# Patient Record
Sex: FEMALE | Race: OTHER
[De-identification: names, ages, dates, MRNs, and addresses within clinical notes are randomized per-mention and may not be internally consistent; named-entity substitution may affect disease eponyms.]

---

## 2017-03-31 PROBLEM — Z00.00 ENCOUNTER FOR PREVENTIVE HEALTH EXAMINATION: Status: ACTIVE | Noted: 2017-03-31

## 2017-04-12 ENCOUNTER — APPOINTMENT (OUTPATIENT)
Dept: SPINE | Facility: CLINIC | Age: 47
End: 2017-04-12

## 2017-04-12 VITALS
HEIGHT: 63 IN | BODY MASS INDEX: 32.78 KG/M2 | OXYGEN SATURATION: 98 % | SYSTOLIC BLOOD PRESSURE: 148 MMHG | DIASTOLIC BLOOD PRESSURE: 96 MMHG | WEIGHT: 185 LBS | HEART RATE: 64 BPM

## 2017-04-12 DIAGNOSIS — Z78.9 OTHER SPECIFIED HEALTH STATUS: ICD-10-CM

## 2017-04-12 DIAGNOSIS — Z86.79 PERSONAL HISTORY OF OTHER DISEASES OF THE CIRCULATORY SYSTEM: ICD-10-CM

## 2017-04-12 DIAGNOSIS — Z86.69 PERSONAL HISTORY OF OTHER DISEASES OF THE NERVOUS SYSTEM AND SENSE ORGANS: ICD-10-CM

## 2017-04-12 RX ORDER — AMLODIPINE BESYLATE 5 MG/1
TABLET ORAL
Refills: 0 | Status: ACTIVE | COMMUNITY

## 2017-05-31 ENCOUNTER — APPOINTMENT (OUTPATIENT)
Dept: SPINE | Facility: CLINIC | Age: 47
End: 2017-05-31

## 2017-05-31 VITALS
OXYGEN SATURATION: 99 % | WEIGHT: 185 LBS | DIASTOLIC BLOOD PRESSURE: 93 MMHG | BODY MASS INDEX: 32.78 KG/M2 | HEIGHT: 63 IN | HEART RATE: 51 BPM | SYSTOLIC BLOOD PRESSURE: 154 MMHG

## 2017-07-12 ENCOUNTER — APPOINTMENT (OUTPATIENT)
Dept: ORTHOPEDIC SURGERY | Facility: CLINIC | Age: 47
End: 2017-07-12

## 2017-07-12 VITALS — SYSTOLIC BLOOD PRESSURE: 131 MMHG | DIASTOLIC BLOOD PRESSURE: 84 MMHG | HEART RATE: 67 BPM

## 2017-07-12 VITALS — WEIGHT: 180 LBS | BODY MASS INDEX: 29.99 KG/M2 | HEIGHT: 65 IN

## 2017-09-11 ENCOUNTER — APPOINTMENT (OUTPATIENT)
Dept: ORTHOPEDIC SURGERY | Facility: CLINIC | Age: 47
End: 2017-09-11
Payer: MEDICAID

## 2017-09-11 VITALS
HEART RATE: 69 BPM | WEIGHT: 190 LBS | SYSTOLIC BLOOD PRESSURE: 134 MMHG | DIASTOLIC BLOOD PRESSURE: 89 MMHG | HEIGHT: 63 IN | BODY MASS INDEX: 33.66 KG/M2

## 2017-09-11 DIAGNOSIS — Z78.9 OTHER SPECIFIED HEALTH STATUS: ICD-10-CM

## 2017-09-11 PROCEDURE — 99215 OFFICE O/P EST HI 40 MIN: CPT

## 2017-09-11 RX ORDER — FAMOTIDINE 20 MG/1
20 TABLET, FILM COATED ORAL
Qty: 60 | Refills: 0 | Status: ACTIVE | COMMUNITY
Start: 2017-08-22

## 2017-09-11 RX ORDER — CALCIUM CARBONATE/VITAMIN D3 600 MG-10
600-400 TABLET ORAL
Qty: 30 | Refills: 0 | Status: ACTIVE | COMMUNITY
Start: 2017-08-22

## 2017-09-11 RX ORDER — CYCLOBENZAPRINE HYDROCHLORIDE 5 MG/1
5 TABLET, FILM COATED ORAL
Qty: 21 | Refills: 0 | Status: ACTIVE | COMMUNITY
Start: 2017-06-22

## 2017-09-11 RX ORDER — DOCUSATE SODIUM 100 MG/1
100 CAPSULE ORAL
Qty: 90 | Refills: 0 | Status: ACTIVE | COMMUNITY
Start: 2017-08-22

## 2017-09-11 RX ORDER — IBUPROFEN 800 MG/1
800 TABLET, FILM COATED ORAL
Qty: 21 | Refills: 0 | Status: ACTIVE | COMMUNITY
Start: 2017-06-22

## 2017-09-14 ENCOUNTER — APPOINTMENT (OUTPATIENT)
Dept: MRI IMAGING | Facility: CLINIC | Age: 47
End: 2017-09-14

## 2017-09-27 ENCOUNTER — OUTPATIENT (OUTPATIENT)
Dept: OUTPATIENT SERVICES | Facility: HOSPITAL | Age: 47
LOS: 1 days | End: 2017-09-27
Payer: MEDICAID

## 2017-09-27 VITALS
SYSTOLIC BLOOD PRESSURE: 127 MMHG | DIASTOLIC BLOOD PRESSURE: 89 MMHG | RESPIRATION RATE: 16 BRPM | HEIGHT: 63 IN | TEMPERATURE: 98 F | HEART RATE: 60 BPM | OXYGEN SATURATION: 97 % | WEIGHT: 195.11 LBS

## 2017-09-27 DIAGNOSIS — Z30.430 ENCOUNTER FOR INSERTION OF INTRAUTERINE CONTRACEPTIVE DEVICE: Chronic | ICD-10-CM

## 2017-09-27 DIAGNOSIS — Z90.49 ACQUIRED ABSENCE OF OTHER SPECIFIED PARTS OF DIGESTIVE TRACT: Chronic | ICD-10-CM

## 2017-09-27 DIAGNOSIS — Z98.51 TUBAL LIGATION STATUS: Chronic | ICD-10-CM

## 2017-09-27 DIAGNOSIS — Z01.818 ENCOUNTER FOR OTHER PREPROCEDURAL EXAMINATION: ICD-10-CM

## 2017-09-27 DIAGNOSIS — M50.20 OTHER CERVICAL DISC DISPLACEMENT, UNSPECIFIED CERVICAL REGION: ICD-10-CM

## 2017-09-27 DIAGNOSIS — I10 ESSENTIAL (PRIMARY) HYPERTENSION: ICD-10-CM

## 2017-09-27 LAB
ANION GAP SERPL CALC-SCNC: 15 MMOL/L — SIGNIFICANT CHANGE UP (ref 5–17)
BLD GP AB SCN SERPL QL: NEGATIVE — SIGNIFICANT CHANGE UP
BUN SERPL-MCNC: 9 MG/DL — SIGNIFICANT CHANGE UP (ref 7–23)
CALCIUM SERPL-MCNC: 9.8 MG/DL — SIGNIFICANT CHANGE UP (ref 8.4–10.5)
CHLORIDE SERPL-SCNC: 100 MMOL/L — SIGNIFICANT CHANGE UP (ref 96–108)
CO2 SERPL-SCNC: 26 MMOL/L — SIGNIFICANT CHANGE UP (ref 22–31)
CREAT SERPL-MCNC: 0.73 MG/DL — SIGNIFICANT CHANGE UP (ref 0.5–1.3)
GLUCOSE SERPL-MCNC: 95 MG/DL — SIGNIFICANT CHANGE UP (ref 70–99)
HCT VFR BLD CALC: 39.1 % — SIGNIFICANT CHANGE UP (ref 34.5–45)
HGB BLD-MCNC: 13.1 G/DL — SIGNIFICANT CHANGE UP (ref 11.5–15.5)
MCHC RBC-ENTMCNC: 29.8 PG — SIGNIFICANT CHANGE UP (ref 27–34)
MCHC RBC-ENTMCNC: 33.5 GM/DL — SIGNIFICANT CHANGE UP (ref 32–36)
MCV RBC AUTO: 89.1 FL — SIGNIFICANT CHANGE UP (ref 80–100)
PLATELET # BLD AUTO: 329 K/UL — SIGNIFICANT CHANGE UP (ref 150–400)
POTASSIUM SERPL-MCNC: 4 MMOL/L — SIGNIFICANT CHANGE UP (ref 3.5–5.3)
POTASSIUM SERPL-SCNC: 4 MMOL/L — SIGNIFICANT CHANGE UP (ref 3.5–5.3)
RBC # BLD: 4.39 M/UL — SIGNIFICANT CHANGE UP (ref 3.8–5.2)
RBC # FLD: 13.4 % — SIGNIFICANT CHANGE UP (ref 10.3–14.5)
RH IG SCN BLD-IMP: POSITIVE — SIGNIFICANT CHANGE UP
SODIUM SERPL-SCNC: 141 MMOL/L — SIGNIFICANT CHANGE UP (ref 135–145)
WBC # BLD: 6.84 K/UL — SIGNIFICANT CHANGE UP (ref 3.8–10.5)
WBC # FLD AUTO: 6.84 K/UL — SIGNIFICANT CHANGE UP (ref 3.8–10.5)

## 2017-09-27 PROCEDURE — G0463: CPT

## 2017-09-27 PROCEDURE — 80048 BASIC METABOLIC PNL TOTAL CA: CPT

## 2017-09-27 PROCEDURE — 85027 COMPLETE CBC AUTOMATED: CPT

## 2017-09-27 PROCEDURE — 86850 RBC ANTIBODY SCREEN: CPT

## 2017-09-27 PROCEDURE — 86900 BLOOD TYPING SEROLOGIC ABO: CPT

## 2017-09-27 PROCEDURE — 86901 BLOOD TYPING SEROLOGIC RH(D): CPT

## 2017-09-27 RX ORDER — CEFAZOLIN SODIUM 1 G
2000 VIAL (EA) INJECTION ONCE
Qty: 0 | Refills: 0 | Status: DISCONTINUED | OUTPATIENT
Start: 2017-10-11 | End: 2017-10-16

## 2017-09-27 NOTE — H&P PST ADULT - NS PRO LAST MENSTRUAL DATE
3/2017, Pt report she was having excessive menstural bleeding and  inserted a Mirena IUD. Since the IUD was implanted she has not gotten her period.

## 2017-09-27 NOTE — H&P PST ADULT - PMH
Cervical disc displacement    Cervical radiculopathy    Gall stones    Hypertension    Menorrhagia with irregular cycle    Migraines    Shoulder impingement, right

## 2017-09-27 NOTE — H&P PST ADULT - PSH
Encounter for insertion of mirena IUD  2017  H/O tubal ligation  2008 [facility]  S/P laparoscopic cholecystectomy

## 2017-09-27 NOTE — H&P PST ADULT - NSANTHOSAYNRD_GEN_A_CORE
No. TASIA screening performed.  STOP BANG Legend: 0-2 = LOW Risk; 3-4 = INTERMEDIATE Risk; 5-8 = HIGH Risk

## 2017-09-27 NOTE — H&P PST ADULT - HISTORY OF PRESENT ILLNESS
46 Y/O Female Reports she fell down stairs about 17 months ago. Seen by MD.  S/P MRI , & CT neck.Pt tried PT, spinal injections with little relief. Presents for surgery 48 Y/O R[place]an speaking female Reports she fell down stairs about 17 months ago. C/O Neck pain radiating down right arm , 5 out of 10 .  Seen by MD.  S/P MRI , & CT neck. Pt tried PT, spinal injections with little relief. Presents for surgeryC6-7 Anterior cervical Discectomy and Fusion.

## 2017-10-11 ENCOUNTER — APPOINTMENT (OUTPATIENT)
Dept: ORTHOPEDIC SURGERY | Facility: HOSPITAL | Age: 47
End: 2017-10-11

## 2017-10-11 ENCOUNTER — TRANSCRIPTION ENCOUNTER (OUTPATIENT)
Age: 47
End: 2017-10-11

## 2017-10-11 ENCOUNTER — INPATIENT (INPATIENT)
Facility: HOSPITAL | Age: 47
LOS: 4 days | Discharge: NURSING, REHAB OR LONG-TERM CARE | DRG: 473 | End: 2017-10-16
Attending: ORTHOPAEDIC SURGERY | Admitting: ORTHOPAEDIC SURGERY
Payer: MEDICAID

## 2017-10-11 ENCOUNTER — RESULT REVIEW (OUTPATIENT)
Age: 47
End: 2017-10-11

## 2017-10-11 VITALS
RESPIRATION RATE: 20 BRPM | OXYGEN SATURATION: 100 % | TEMPERATURE: 98 F | HEART RATE: 60 BPM | DIASTOLIC BLOOD PRESSURE: 88 MMHG | HEIGHT: 63 IN | SYSTOLIC BLOOD PRESSURE: 137 MMHG | WEIGHT: 195.11 LBS

## 2017-10-11 DIAGNOSIS — Z30.430 ENCOUNTER FOR INSERTION OF INTRAUTERINE CONTRACEPTIVE DEVICE: Chronic | ICD-10-CM

## 2017-10-11 DIAGNOSIS — Z90.49 ACQUIRED ABSENCE OF OTHER SPECIFIED PARTS OF DIGESTIVE TRACT: Chronic | ICD-10-CM

## 2017-10-11 DIAGNOSIS — Z98.51 TUBAL LIGATION STATUS: Chronic | ICD-10-CM

## 2017-10-11 DIAGNOSIS — M50.20 OTHER CERVICAL DISC DISPLACEMENT, UNSPECIFIED CERVICAL REGION: ICD-10-CM

## 2017-10-11 DIAGNOSIS — Z01.818 ENCOUNTER FOR OTHER PREPROCEDURAL EXAMINATION: ICD-10-CM

## 2017-10-11 LAB
ANION GAP SERPL CALC-SCNC: 15 MMOL/L — SIGNIFICANT CHANGE UP (ref 5–17)
BASOPHILS # BLD AUTO: 0 K/UL — SIGNIFICANT CHANGE UP (ref 0–0.2)
BASOPHILS NFR BLD AUTO: 0.5 % — SIGNIFICANT CHANGE UP (ref 0–2)
BUN SERPL-MCNC: 8 MG/DL — SIGNIFICANT CHANGE UP (ref 7–23)
CALCIUM SERPL-MCNC: 7.9 MG/DL — LOW (ref 8.4–10.5)
CHLORIDE SERPL-SCNC: 105 MMOL/L — SIGNIFICANT CHANGE UP (ref 96–108)
CO2 SERPL-SCNC: 23 MMOL/L — SIGNIFICANT CHANGE UP (ref 22–31)
CREAT SERPL-MCNC: 0.59 MG/DL — SIGNIFICANT CHANGE UP (ref 0.5–1.3)
EOSINOPHIL # BLD AUTO: 0.1 K/UL — SIGNIFICANT CHANGE UP (ref 0–0.5)
EOSINOPHIL NFR BLD AUTO: 1 % — SIGNIFICANT CHANGE UP (ref 0–6)
GLUCOSE SERPL-MCNC: 154 MG/DL — HIGH (ref 70–99)
HCT VFR BLD CALC: 35.7 % — SIGNIFICANT CHANGE UP (ref 34.5–45)
HGB BLD-MCNC: 12.5 G/DL — SIGNIFICANT CHANGE UP (ref 11.5–15.5)
LYMPHOCYTES # BLD AUTO: 2.9 K/UL — SIGNIFICANT CHANGE UP (ref 1–3.3)
LYMPHOCYTES # BLD AUTO: 32.8 % — SIGNIFICANT CHANGE UP (ref 13–44)
MCHC RBC-ENTMCNC: 32 PG — SIGNIFICANT CHANGE UP (ref 27–34)
MCHC RBC-ENTMCNC: 34.9 GM/DL — SIGNIFICANT CHANGE UP (ref 32–36)
MCV RBC AUTO: 91.7 FL — SIGNIFICANT CHANGE UP (ref 80–100)
MONOCYTES # BLD AUTO: 0.5 K/UL — SIGNIFICANT CHANGE UP (ref 0–0.9)
MONOCYTES NFR BLD AUTO: 6.1 % — SIGNIFICANT CHANGE UP (ref 2–14)
NEUTROPHILS # BLD AUTO: 5.3 K/UL — SIGNIFICANT CHANGE UP (ref 1.8–7.4)
NEUTROPHILS NFR BLD AUTO: 59.6 % — SIGNIFICANT CHANGE UP (ref 43–77)
PLATELET # BLD AUTO: 249 K/UL — SIGNIFICANT CHANGE UP (ref 150–400)
POTASSIUM SERPL-MCNC: 3.1 MMOL/L — LOW (ref 3.5–5.3)
POTASSIUM SERPL-SCNC: 3.1 MMOL/L — LOW (ref 3.5–5.3)
RBC # BLD: 3.9 M/UL — SIGNIFICANT CHANGE UP (ref 3.8–5.2)
RBC # FLD: 12.1 % — SIGNIFICANT CHANGE UP (ref 10.3–14.5)
RH IG SCN BLD-IMP: POSITIVE — SIGNIFICANT CHANGE UP
SODIUM SERPL-SCNC: 143 MMOL/L — SIGNIFICANT CHANGE UP (ref 135–145)
WBC # BLD: 8.8 K/UL — SIGNIFICANT CHANGE UP (ref 3.8–10.5)
WBC # FLD AUTO: 8.8 K/UL — SIGNIFICANT CHANGE UP (ref 3.8–10.5)

## 2017-10-11 PROCEDURE — 22845 INSERT SPINE FIXATION DEVICE: CPT | Mod: 59

## 2017-10-11 PROCEDURE — 22853 INSJ BIOMECHANICAL DEVICE: CPT

## 2017-10-11 PROCEDURE — 88311 DECALCIFY TISSUE: CPT | Mod: 26

## 2017-10-11 PROCEDURE — 88304 TISSUE EXAM BY PATHOLOGIST: CPT | Mod: 26

## 2017-10-11 PROCEDURE — 22853 INSJ BIOMECHANICAL DEVICE: CPT | Mod: AS

## 2017-10-11 PROCEDURE — 22845 INSERT SPINE FIXATION DEVICE: CPT | Mod: AS,59

## 2017-10-11 PROCEDURE — 22551 ARTHRD ANT NTRBDY CERVICAL: CPT | Mod: AS

## 2017-10-11 PROCEDURE — 22551 ARTHRD ANT NTRBDY CERVICAL: CPT

## 2017-10-11 RX ORDER — GABAPENTIN 400 MG/1
300 CAPSULE ORAL THREE TIMES A DAY
Qty: 0 | Refills: 0 | Status: DISCONTINUED | OUTPATIENT
Start: 2017-10-11 | End: 2017-10-16

## 2017-10-11 RX ORDER — DEXAMETHASONE 0.5 MG/5ML
5 ELIXIR ORAL EVERY 6 HOURS
Qty: 0 | Refills: 0 | Status: COMPLETED | OUTPATIENT
Start: 2017-10-11 | End: 2017-10-12

## 2017-10-11 RX ORDER — METOCLOPRAMIDE HCL 10 MG
5 TABLET ORAL ONCE
Qty: 0 | Refills: 0 | Status: COMPLETED | OUTPATIENT
Start: 2017-10-11 | End: 2017-10-11

## 2017-10-11 RX ORDER — SENNA PLUS 8.6 MG/1
2 TABLET ORAL AT BEDTIME
Qty: 0 | Refills: 0 | Status: DISCONTINUED | OUTPATIENT
Start: 2017-10-11 | End: 2017-10-16

## 2017-10-11 RX ORDER — DEXAMETHASONE 0.5 MG/5ML
3 ELIXIR ORAL EVERY 6 HOURS
Qty: 0 | Refills: 0 | Status: DISCONTINUED | OUTPATIENT
Start: 2017-10-12 | End: 2017-10-13

## 2017-10-11 RX ORDER — IPRATROPIUM BROMIDE 0.2 MG/ML
500 SOLUTION, NON-ORAL INHALATION ONCE
Qty: 0 | Refills: 0 | Status: COMPLETED | OUTPATIENT
Start: 2017-10-11 | End: 2017-10-11

## 2017-10-11 RX ORDER — OXYCODONE HYDROCHLORIDE 5 MG/1
10 TABLET ORAL
Qty: 0 | Refills: 0 | Status: DISCONTINUED | OUTPATIENT
Start: 2017-10-11 | End: 2017-10-16

## 2017-10-11 RX ORDER — CEFAZOLIN SODIUM 1 G
2000 VIAL (EA) INJECTION EVERY 8 HOURS
Qty: 0 | Refills: 0 | Status: COMPLETED | OUTPATIENT
Start: 2017-10-11 | End: 2017-10-12

## 2017-10-11 RX ORDER — FAMOTIDINE 10 MG/ML
20 INJECTION INTRAVENOUS EVERY 12 HOURS
Qty: 0 | Refills: 0 | Status: DISCONTINUED | OUTPATIENT
Start: 2017-10-11 | End: 2017-10-16

## 2017-10-11 RX ORDER — HYDROMORPHONE HYDROCHLORIDE 2 MG/ML
0.5 INJECTION INTRAMUSCULAR; INTRAVENOUS; SUBCUTANEOUS
Qty: 0 | Refills: 0 | Status: DISCONTINUED | OUTPATIENT
Start: 2017-10-11 | End: 2017-10-11

## 2017-10-11 RX ORDER — HYDROMORPHONE HYDROCHLORIDE 2 MG/ML
1 INJECTION INTRAMUSCULAR; INTRAVENOUS; SUBCUTANEOUS
Qty: 0 | Refills: 0 | Status: DISCONTINUED | OUTPATIENT
Start: 2017-10-11 | End: 2017-10-11

## 2017-10-11 RX ORDER — DOCUSATE SODIUM 100 MG
100 CAPSULE ORAL THREE TIMES A DAY
Qty: 0 | Refills: 0 | Status: DISCONTINUED | OUTPATIENT
Start: 2017-10-11 | End: 2017-10-16

## 2017-10-11 RX ORDER — AMLODIPINE BESYLATE 2.5 MG/1
10 TABLET ORAL DAILY
Qty: 0 | Refills: 0 | Status: DISCONTINUED | OUTPATIENT
Start: 2017-10-11 | End: 2017-10-16

## 2017-10-11 RX ORDER — ONDANSETRON 8 MG/1
4 TABLET, FILM COATED ORAL ONCE
Qty: 0 | Refills: 0 | Status: DISCONTINUED | OUTPATIENT
Start: 2017-10-11 | End: 2017-10-11

## 2017-10-11 RX ORDER — DIAZEPAM 5 MG
5 TABLET ORAL EVERY 12 HOURS
Qty: 0 | Refills: 0 | Status: DISCONTINUED | OUTPATIENT
Start: 2017-10-11 | End: 2017-10-16

## 2017-10-11 RX ORDER — ONDANSETRON 8 MG/1
4 TABLET, FILM COATED ORAL EVERY 6 HOURS
Qty: 0 | Refills: 0 | Status: DISCONTINUED | OUTPATIENT
Start: 2017-10-11 | End: 2017-10-16

## 2017-10-11 RX ORDER — POTASSIUM CHLORIDE 20 MEQ
20 PACKET (EA) ORAL
Qty: 0 | Refills: 0 | Status: COMPLETED | OUTPATIENT
Start: 2017-10-11 | End: 2017-10-11

## 2017-10-11 RX ORDER — ACETAMINOPHEN 500 MG
1000 TABLET ORAL ONCE
Qty: 0 | Refills: 0 | Status: COMPLETED | OUTPATIENT
Start: 2017-10-11 | End: 2017-10-11

## 2017-10-11 RX ORDER — OXYCODONE HYDROCHLORIDE 5 MG/1
5 TABLET ORAL
Qty: 0 | Refills: 0 | Status: DISCONTINUED | OUTPATIENT
Start: 2017-10-11 | End: 2017-10-16

## 2017-10-11 RX ORDER — SODIUM CHLORIDE 9 MG/ML
1000 INJECTION, SOLUTION INTRAVENOUS
Qty: 0 | Refills: 0 | Status: DISCONTINUED | OUTPATIENT
Start: 2017-10-11 | End: 2017-10-16

## 2017-10-11 RX ORDER — SODIUM CHLORIDE 9 MG/ML
3 INJECTION INTRAMUSCULAR; INTRAVENOUS; SUBCUTANEOUS EVERY 8 HOURS
Qty: 0 | Refills: 0 | Status: DISCONTINUED | OUTPATIENT
Start: 2017-10-11 | End: 2017-10-11

## 2017-10-11 RX ORDER — MORPHINE SULFATE 50 MG/1
4 CAPSULE, EXTENDED RELEASE ORAL
Qty: 0 | Refills: 0 | Status: DISCONTINUED | OUTPATIENT
Start: 2017-10-11 | End: 2017-10-16

## 2017-10-11 RX ORDER — MAGNESIUM HYDROXIDE 400 MG/1
30 TABLET, CHEWABLE ORAL EVERY 12 HOURS
Qty: 0 | Refills: 0 | Status: DISCONTINUED | OUTPATIENT
Start: 2017-10-11 | End: 2017-10-16

## 2017-10-11 RX ORDER — PANTOPRAZOLE SODIUM 20 MG/1
40 TABLET, DELAYED RELEASE ORAL
Qty: 0 | Refills: 0 | Status: DISCONTINUED | OUTPATIENT
Start: 2017-10-11 | End: 2017-10-16

## 2017-10-11 RX ORDER — DEXAMETHASONE 0.5 MG/5ML
1 ELIXIR ORAL EVERY 6 HOURS
Qty: 0 | Refills: 0 | Status: DISCONTINUED | OUTPATIENT
Start: 2017-10-13 | End: 2017-10-13

## 2017-10-11 RX ADMIN — GABAPENTIN 300 MILLIGRAM(S): 400 CAPSULE ORAL at 21:03

## 2017-10-11 RX ADMIN — HYDROMORPHONE HYDROCHLORIDE 0.5 MILLIGRAM(S): 2 INJECTION INTRAMUSCULAR; INTRAVENOUS; SUBCUTANEOUS at 18:45

## 2017-10-11 RX ADMIN — HYDROMORPHONE HYDROCHLORIDE 1 MILLIGRAM(S): 2 INJECTION INTRAMUSCULAR; INTRAVENOUS; SUBCUTANEOUS at 19:15

## 2017-10-11 RX ADMIN — HYDROMORPHONE HYDROCHLORIDE 0.5 MILLIGRAM(S): 2 INJECTION INTRAMUSCULAR; INTRAVENOUS; SUBCUTANEOUS at 21:15

## 2017-10-11 RX ADMIN — Medication 5 MILLIGRAM(S): at 21:04

## 2017-10-11 RX ADMIN — Medication 5 MILLIGRAM(S): at 19:35

## 2017-10-11 RX ADMIN — HYDROMORPHONE HYDROCHLORIDE 0.5 MILLIGRAM(S): 2 INJECTION INTRAMUSCULAR; INTRAVENOUS; SUBCUTANEOUS at 19:00

## 2017-10-11 RX ADMIN — HYDROMORPHONE HYDROCHLORIDE 0.5 MILLIGRAM(S): 2 INJECTION INTRAMUSCULAR; INTRAVENOUS; SUBCUTANEOUS at 21:30

## 2017-10-11 RX ADMIN — Medication 500 MICROGRAM(S): at 23:11

## 2017-10-11 RX ADMIN — Medication 5 MILLIGRAM(S): at 23:11

## 2017-10-11 RX ADMIN — ONDANSETRON 4 MILLIGRAM(S): 8 TABLET, FILM COATED ORAL at 20:30

## 2017-10-11 RX ADMIN — Medication 400 MILLIGRAM(S): at 23:11

## 2017-10-11 RX ADMIN — HYDROMORPHONE HYDROCHLORIDE 1 MILLIGRAM(S): 2 INJECTION INTRAMUSCULAR; INTRAVENOUS; SUBCUTANEOUS at 19:30

## 2017-10-11 RX ADMIN — Medication 100 MILLIGRAM(S): at 22:59

## 2017-10-11 RX ADMIN — SODIUM CHLORIDE 100 MILLILITER(S): 9 INJECTION, SOLUTION INTRAVENOUS at 18:25

## 2017-10-11 RX ADMIN — Medication 1000 MILLIGRAM(S): at 23:26

## 2017-10-11 RX ADMIN — Medication 20 MILLIEQUIVALENT(S): at 20:03

## 2017-10-11 RX ADMIN — Medication 20 MILLIEQUIVALENT(S): at 21:04

## 2017-10-11 NOTE — BRIEF OPERATIVE NOTE - PROCEDURE
<<-----Click on this checkbox to enter Procedure Anterior cervical discectomy with fusion  10/11/2017  C6/C7  Active  [id number]

## 2017-10-11 NOTE — PATIENT PROFILE ADULT. - PSH
Encounter for insertion of mirena IUD  2017  H/O tubal ligation  2008 [facility]  S/P laparoscopic cholecystectomy

## 2017-10-11 NOTE — CHART NOTE - NSCHARTNOTEFT_GEN_A_CORE
Patient primarily Upper sorbian-speaking, refused  services,  bilingual.  Patient Resting, no Chest Pain, SOB, N/V.  Pre op, patient reported RUE pain, parasthesias, radiculopathy, numbness, and weakness ; Post op, patient reports no new deficits, persisting RUE sx.    T(C): 36.2 (10-11-17 @ 17:50), Max: 36.9 (10-11-17 @ 09:53)  HR: 61 (10-11-17 @ 20:00) (55 - 65)  BP: 137/88 (10-11-17 @ 09:53) (137/88 - 137/88)  RR: 14 (10-11-17 @ 20:00) (14 - 20)  SpO2: 99% (10-11-17 @ 20:00) (95% - 100%)  Wt(kg): --  Exam:   Alert/Oriented, No Acute Distress           Dressing: [x ] clean/dry/intact  [ ] Other:  anterior neck, in [place] J         Drains: : n/a         Sensation: [ x] intact to light touch  [x ] decreased: RUE in ulnar distribution         Motor exam: [  x]          [x ] Upper extremity                Bi          Tri        Delt                                                    R         4/5        4/5        4/5                                               L          4+/5        4+/5        4+/5          R<L           [ x] Lower extremity            Intact bilat hip flexor/abd/add/knee flex/ext bilat                                                          PF          DF         EHL       FHL                                                                                            R        5/5        5/5        5/5       5/5                                                        L         5/5        5/5        5/5       5/5                                                                  Calves Soft/Non-tender bilaterally           LABS:                        12.5   8.8   )-----------( 249      ( 11 Oct 2017 19:17 )             35.7     10-11    143  |  105  |  8   ----------------------------<  154<H>  3.1<L>   |  23  |  0.59    Ca    7.9<L>      11 Oct 2017 19:17    A/P :  47y Female s/p ACDF C6-C7.  Post op, no new deficits, reporting pain, decreased sensation in ulnar distribution RUE.  VSS. NAD  -    Pain control  -    Taper  -    DVT ppx: SCDs       -    Physical Therapy  -    Weight bearing status: WBAT in [place] when arrives, aileen VILLAGOMEZ for now      Christophe Catalan PA-C  Team Pager #[phone]

## 2017-10-12 LAB
ANION GAP SERPL CALC-SCNC: 16 MMOL/L — SIGNIFICANT CHANGE UP (ref 5–17)
BASOPHILS # BLD AUTO: 0.01 K/UL — SIGNIFICANT CHANGE UP (ref 0–0.2)
BASOPHILS NFR BLD AUTO: 0.1 % — SIGNIFICANT CHANGE UP (ref 0–2)
BUN SERPL-MCNC: 10 MG/DL — SIGNIFICANT CHANGE UP (ref 7–23)
CALCIUM SERPL-MCNC: 9.2 MG/DL — SIGNIFICANT CHANGE UP (ref 8.4–10.5)
CHLORIDE SERPL-SCNC: 103 MMOL/L — SIGNIFICANT CHANGE UP (ref 96–108)
CO2 SERPL-SCNC: 20 MMOL/L — LOW (ref 22–31)
CREAT SERPL-MCNC: 0.76 MG/DL — SIGNIFICANT CHANGE UP (ref 0.5–1.3)
EOSINOPHIL # BLD AUTO: 0 K/UL — SIGNIFICANT CHANGE UP (ref 0–0.5)
EOSINOPHIL NFR BLD AUTO: 0 % — SIGNIFICANT CHANGE UP (ref 0–6)
GLUCOSE SERPL-MCNC: 182 MG/DL — HIGH (ref 70–99)
HCT VFR BLD CALC: 37.7 % — SIGNIFICANT CHANGE UP (ref 34.5–45)
HGB BLD-MCNC: 13 G/DL — SIGNIFICANT CHANGE UP (ref 11.5–15.5)
IMM GRANULOCYTES NFR BLD AUTO: 0.2 % — SIGNIFICANT CHANGE UP (ref 0–1.5)
LYMPHOCYTES # BLD AUTO: 0.86 K/UL — LOW (ref 1–3.3)
LYMPHOCYTES # BLD AUTO: 5.7 % — LOW (ref 13–44)
MCHC RBC-ENTMCNC: 30.7 PG — SIGNIFICANT CHANGE UP (ref 27–34)
MCHC RBC-ENTMCNC: 34.5 GM/DL — SIGNIFICANT CHANGE UP (ref 32–36)
MCV RBC AUTO: 88.9 FL — SIGNIFICANT CHANGE UP (ref 80–100)
MONOCYTES # BLD AUTO: 0.09 K/UL — SIGNIFICANT CHANGE UP (ref 0–0.9)
MONOCYTES NFR BLD AUTO: 0.6 % — LOW (ref 2–14)
NEUTROPHILS # BLD AUTO: 14.13 K/UL — HIGH (ref 1.8–7.4)
NEUTROPHILS NFR BLD AUTO: 93.4 % — HIGH (ref 43–77)
PLATELET # BLD AUTO: 309 K/UL — SIGNIFICANT CHANGE UP (ref 150–400)
POTASSIUM SERPL-MCNC: 3.8 MMOL/L — SIGNIFICANT CHANGE UP (ref 3.5–5.3)
POTASSIUM SERPL-SCNC: 3.8 MMOL/L — SIGNIFICANT CHANGE UP (ref 3.5–5.3)
RBC # BLD: 4.24 M/UL — SIGNIFICANT CHANGE UP (ref 3.8–5.2)
RBC # FLD: 13.6 % — SIGNIFICANT CHANGE UP (ref 10.3–14.5)
SODIUM SERPL-SCNC: 139 MMOL/L — SIGNIFICANT CHANGE UP (ref 135–145)
WBC # BLD: 15.12 K/UL — HIGH (ref 3.8–10.5)
WBC # FLD AUTO: 15.12 K/UL — HIGH (ref 3.8–10.5)

## 2017-10-12 PROCEDURE — 72125 CT NECK SPINE W/O DYE: CPT | Mod: 26

## 2017-10-12 PROCEDURE — 72141 MRI NECK SPINE W/O DYE: CPT | Mod: 26

## 2017-10-12 RX ORDER — TRAMADOL HYDROCHLORIDE 50 MG/1
50 TABLET ORAL ONCE
Qty: 0 | Refills: 0 | Status: DISCONTINUED | OUTPATIENT
Start: 2017-10-12 | End: 2017-10-12

## 2017-10-12 RX ORDER — INFLUENZA VIRUS VACCINE 15; 15; 15; 15 UG/.5ML; UG/.5ML; UG/.5ML; UG/.5ML
0.5 SUSPENSION INTRAMUSCULAR ONCE
Qty: 0 | Refills: 0 | Status: COMPLETED | OUTPATIENT
Start: 2017-10-12 | End: 2017-10-12

## 2017-10-12 RX ADMIN — Medication 5 MILLIGRAM(S): at 03:35

## 2017-10-12 RX ADMIN — Medication 100 MILLIGRAM(S): at 06:05

## 2017-10-12 RX ADMIN — AMLODIPINE BESYLATE 10 MILLIGRAM(S): 2.5 TABLET ORAL at 06:19

## 2017-10-12 RX ADMIN — GABAPENTIN 300 MILLIGRAM(S): 400 CAPSULE ORAL at 13:51

## 2017-10-12 RX ADMIN — Medication 3 MILLIGRAM(S): at 21:50

## 2017-10-12 RX ADMIN — MORPHINE SULFATE 4 MILLIGRAM(S): 50 CAPSULE, EXTENDED RELEASE ORAL at 22:06

## 2017-10-12 RX ADMIN — Medication 100 MILLIGRAM(S): at 21:55

## 2017-10-12 RX ADMIN — Medication 100 MILLIGRAM(S): at 13:51

## 2017-10-12 RX ADMIN — TRAMADOL HYDROCHLORIDE 50 MILLIGRAM(S): 50 TABLET ORAL at 01:57

## 2017-10-12 RX ADMIN — OXYCODONE HYDROCHLORIDE 10 MILLIGRAM(S): 5 TABLET ORAL at 06:35

## 2017-10-12 RX ADMIN — PANTOPRAZOLE SODIUM 40 MILLIGRAM(S): 20 TABLET, DELAYED RELEASE ORAL at 06:05

## 2017-10-12 RX ADMIN — SENNA PLUS 2 TABLET(S): 8.6 TABLET ORAL at 21:55

## 2017-10-12 RX ADMIN — OXYCODONE HYDROCHLORIDE 10 MILLIGRAM(S): 5 TABLET ORAL at 14:22

## 2017-10-12 RX ADMIN — MORPHINE SULFATE 4 MILLIGRAM(S): 50 CAPSULE, EXTENDED RELEASE ORAL at 22:36

## 2017-10-12 RX ADMIN — OXYCODONE HYDROCHLORIDE 10 MILLIGRAM(S): 5 TABLET ORAL at 06:05

## 2017-10-12 RX ADMIN — MORPHINE SULFATE 4 MILLIGRAM(S): 50 CAPSULE, EXTENDED RELEASE ORAL at 16:19

## 2017-10-12 RX ADMIN — GABAPENTIN 300 MILLIGRAM(S): 400 CAPSULE ORAL at 21:55

## 2017-10-12 RX ADMIN — Medication 100 MILLIGRAM(S): at 06:53

## 2017-10-12 RX ADMIN — Medication 5 MILLIGRAM(S): at 08:21

## 2017-10-12 RX ADMIN — OXYCODONE HYDROCHLORIDE 10 MILLIGRAM(S): 5 TABLET ORAL at 13:52

## 2017-10-12 RX ADMIN — GABAPENTIN 300 MILLIGRAM(S): 400 CAPSULE ORAL at 06:05

## 2017-10-12 RX ADMIN — SODIUM CHLORIDE 100 MILLILITER(S): 9 INJECTION, SOLUTION INTRAVENOUS at 22:58

## 2017-10-12 RX ADMIN — Medication 5 MILLIGRAM(S): at 09:03

## 2017-10-12 RX ADMIN — MORPHINE SULFATE 4 MILLIGRAM(S): 50 CAPSULE, EXTENDED RELEASE ORAL at 16:34

## 2017-10-12 RX ADMIN — Medication 5 MILLIGRAM(S): at 15:00

## 2017-10-12 RX ADMIN — TRAMADOL HYDROCHLORIDE 50 MILLIGRAM(S): 50 TABLET ORAL at 02:27

## 2017-10-12 NOTE — PROGRESS NOTE ADULT - ASSESSMENT
47F s/p C6-7 ACDF POD #1    pain control  WBAT  [place] J  Dressing changed  IV tylenol x1 dose stat.  O2 via NC  will reasses

## 2017-10-12 NOTE — PHYSICAL THERAPY INITIAL EVALUATION ADULT - ACTIVE RANGE OF MOTION EXAMINATION, REHAB EVAL
bilateral  lower extremity Active ROM was WFL (within functional limits)/LUE AROM WFLs, RUE AAROM WFLs pain with movement

## 2017-10-12 NOTE — PHYSICAL THERAPY INITIAL EVALUATION ADULT - PERTINENT HX OF CURRENT PROBLEM, REHAB EVAL
Pt is a 47 y.o female hx cervical pain radiating down BUE with numbness/weakness now s/p sx as noted above.

## 2017-10-12 NOTE — PHYSICAL THERAPY INITIAL EVALUATION ADULT - GAIT DEVIATIONS NOTED, PT EVAL
decreased weight-shifting ability/decreased horacio/decreased velocity of limb motion/decreased stride length

## 2017-10-12 NOTE — PHYSICAL THERAPY INITIAL EVALUATION ADULT - ADDITIONAL COMMENTS
PTA pt lived in pvt home with , dtr, 5 steps to enter +HR, no steps inside, pt had difficulty with ADLs due to BUE deficits, family works during the day so she is alone.

## 2017-10-13 LAB
ANION GAP SERPL CALC-SCNC: 10 MMOL/L — SIGNIFICANT CHANGE UP (ref 5–17)
BASOPHILS # BLD AUTO: 0.01 K/UL — SIGNIFICANT CHANGE UP (ref 0–0.2)
BASOPHILS NFR BLD AUTO: 0.1 % — SIGNIFICANT CHANGE UP (ref 0–2)
BUN SERPL-MCNC: 14 MG/DL — SIGNIFICANT CHANGE UP (ref 7–23)
CALCIUM SERPL-MCNC: 8.6 MG/DL — SIGNIFICANT CHANGE UP (ref 8.4–10.5)
CHLORIDE SERPL-SCNC: 104 MMOL/L — SIGNIFICANT CHANGE UP (ref 96–108)
CO2 SERPL-SCNC: 26 MMOL/L — SIGNIFICANT CHANGE UP (ref 22–31)
CREAT SERPL-MCNC: 0.62 MG/DL — SIGNIFICANT CHANGE UP (ref 0.5–1.3)
EOSINOPHIL # BLD AUTO: 0 K/UL — SIGNIFICANT CHANGE UP (ref 0–0.5)
EOSINOPHIL NFR BLD AUTO: 0 % — SIGNIFICANT CHANGE UP (ref 0–6)
GLUCOSE SERPL-MCNC: 113 MG/DL — HIGH (ref 70–99)
HCT VFR BLD CALC: 34.9 % — SIGNIFICANT CHANGE UP (ref 34.5–45)
HGB BLD-MCNC: 11.8 G/DL — SIGNIFICANT CHANGE UP (ref 11.5–15.5)
IMM GRANULOCYTES NFR BLD AUTO: 0.3 % — SIGNIFICANT CHANGE UP (ref 0–1.5)
LYMPHOCYTES # BLD AUTO: 1.56 K/UL — SIGNIFICANT CHANGE UP (ref 1–3.3)
LYMPHOCYTES # BLD AUTO: 8 % — LOW (ref 13–44)
MCHC RBC-ENTMCNC: 30.3 PG — SIGNIFICANT CHANGE UP (ref 27–34)
MCHC RBC-ENTMCNC: 33.8 GM/DL — SIGNIFICANT CHANGE UP (ref 32–36)
MCV RBC AUTO: 89.7 FL — SIGNIFICANT CHANGE UP (ref 80–100)
MONOCYTES # BLD AUTO: 0.77 K/UL — SIGNIFICANT CHANGE UP (ref 0–0.9)
MONOCYTES NFR BLD AUTO: 3.9 % — SIGNIFICANT CHANGE UP (ref 2–14)
NEUTROPHILS # BLD AUTO: 17.18 K/UL — HIGH (ref 1.8–7.4)
NEUTROPHILS NFR BLD AUTO: 87.7 % — HIGH (ref 43–77)
PLATELET # BLD AUTO: 275 K/UL — SIGNIFICANT CHANGE UP (ref 150–400)
POTASSIUM SERPL-MCNC: 4.1 MMOL/L — SIGNIFICANT CHANGE UP (ref 3.5–5.3)
POTASSIUM SERPL-SCNC: 4.1 MMOL/L — SIGNIFICANT CHANGE UP (ref 3.5–5.3)
RBC # BLD: 3.89 M/UL — SIGNIFICANT CHANGE UP (ref 3.8–5.2)
RBC # FLD: 13.6 % — SIGNIFICANT CHANGE UP (ref 10.3–14.5)
SODIUM SERPL-SCNC: 140 MMOL/L — SIGNIFICANT CHANGE UP (ref 135–145)
WBC # BLD: 19.58 K/UL — HIGH (ref 3.8–10.5)
WBC # FLD AUTO: 19.58 K/UL — HIGH (ref 3.8–10.5)

## 2017-10-13 RX ORDER — DEXAMETHASONE 0.5 MG/5ML
1 ELIXIR ORAL EVERY 6 HOURS
Qty: 0 | Refills: 0 | Status: COMPLETED | OUTPATIENT
Start: 2017-10-15 | End: 2017-10-15

## 2017-10-13 RX ORDER — DEXAMETHASONE 0.5 MG/5ML
5 ELIXIR ORAL EVERY 6 HOURS
Qty: 0 | Refills: 0 | Status: COMPLETED | OUTPATIENT
Start: 2017-10-13 | End: 2017-10-14

## 2017-10-13 RX ORDER — ACETAMINOPHEN 500 MG
1000 TABLET ORAL ONCE
Qty: 0 | Refills: 0 | Status: COMPLETED | OUTPATIENT
Start: 2017-10-13 | End: 2017-10-13

## 2017-10-13 RX ORDER — DEXAMETHASONE 0.5 MG/5ML
ELIXIR ORAL
Qty: 0 | Refills: 0 | Status: COMPLETED | OUTPATIENT
Start: 2017-10-14 | End: 2017-10-15

## 2017-10-13 RX ORDER — DEXAMETHASONE 0.5 MG/5ML
3 ELIXIR ORAL EVERY 6 HOURS
Qty: 0 | Refills: 0 | Status: COMPLETED | OUTPATIENT
Start: 2017-10-14 | End: 2017-10-14

## 2017-10-13 RX ADMIN — MORPHINE SULFATE 4 MILLIGRAM(S): 50 CAPSULE, EXTENDED RELEASE ORAL at 05:00

## 2017-10-13 RX ADMIN — OXYCODONE HYDROCHLORIDE 10 MILLIGRAM(S): 5 TABLET ORAL at 10:03

## 2017-10-13 RX ADMIN — PANTOPRAZOLE SODIUM 40 MILLIGRAM(S): 20 TABLET, DELAYED RELEASE ORAL at 06:14

## 2017-10-13 RX ADMIN — Medication 100 MILLIGRAM(S): at 23:19

## 2017-10-13 RX ADMIN — SENNA PLUS 2 TABLET(S): 8.6 TABLET ORAL at 23:19

## 2017-10-13 RX ADMIN — MORPHINE SULFATE 4 MILLIGRAM(S): 50 CAPSULE, EXTENDED RELEASE ORAL at 04:26

## 2017-10-13 RX ADMIN — OXYCODONE HYDROCHLORIDE 10 MILLIGRAM(S): 5 TABLET ORAL at 23:59

## 2017-10-13 RX ADMIN — Medication 400 MILLIGRAM(S): at 11:38

## 2017-10-13 RX ADMIN — Medication 1000 MILLIGRAM(S): at 12:08

## 2017-10-13 RX ADMIN — OXYCODONE HYDROCHLORIDE 10 MILLIGRAM(S): 5 TABLET ORAL at 06:12

## 2017-10-13 RX ADMIN — Medication 3 MILLIGRAM(S): at 03:08

## 2017-10-13 RX ADMIN — AMLODIPINE BESYLATE 10 MILLIGRAM(S): 2.5 TABLET ORAL at 06:13

## 2017-10-13 RX ADMIN — Medication 5 MILLIGRAM(S): at 11:39

## 2017-10-13 RX ADMIN — GABAPENTIN 300 MILLIGRAM(S): 400 CAPSULE ORAL at 23:19

## 2017-10-13 RX ADMIN — OXYCODONE HYDROCHLORIDE 10 MILLIGRAM(S): 5 TABLET ORAL at 23:19

## 2017-10-13 RX ADMIN — OXYCODONE HYDROCHLORIDE 10 MILLIGRAM(S): 5 TABLET ORAL at 00:32

## 2017-10-13 RX ADMIN — GABAPENTIN 300 MILLIGRAM(S): 400 CAPSULE ORAL at 15:27

## 2017-10-13 RX ADMIN — OXYCODONE HYDROCHLORIDE 10 MILLIGRAM(S): 5 TABLET ORAL at 01:02

## 2017-10-13 RX ADMIN — Medication 100 MILLIGRAM(S): at 06:13

## 2017-10-13 RX ADMIN — SODIUM CHLORIDE 100 MILLILITER(S): 9 INJECTION, SOLUTION INTRAVENOUS at 03:08

## 2017-10-13 RX ADMIN — OXYCODONE HYDROCHLORIDE 10 MILLIGRAM(S): 5 TABLET ORAL at 15:58

## 2017-10-13 RX ADMIN — Medication 1000 MILLIGRAM(S): at 21:52

## 2017-10-13 RX ADMIN — GABAPENTIN 300 MILLIGRAM(S): 400 CAPSULE ORAL at 06:13

## 2017-10-13 RX ADMIN — Medication 5 MILLIGRAM(S): at 04:34

## 2017-10-13 RX ADMIN — Medication 400 MILLIGRAM(S): at 21:14

## 2017-10-13 RX ADMIN — Medication 5 MILLIGRAM(S): at 17:58

## 2017-10-13 RX ADMIN — OXYCODONE HYDROCHLORIDE 10 MILLIGRAM(S): 5 TABLET ORAL at 10:33

## 2017-10-13 RX ADMIN — OXYCODONE HYDROCHLORIDE 10 MILLIGRAM(S): 5 TABLET ORAL at 15:28

## 2017-10-13 NOTE — PROGRESS NOTE ADULT - ASSESSMENT
Assessment: s/p C6-7 ACDF    Plan:   f/u off read MRI / CT  OOb -> chair  taper  ck labs  monitor Assessment: s/p C6-7 ACDF    Plan:   f/u off read MRI / CT  OOb -> chair  taper  ck labs  monitor    ***See Above  Edis SOLORIO  Orthopedics    B: [phone]  S: [phone]      Update:    Pt MOY at bedside w/ Dr Andino.   phone used.  Pt c/o R arm pain, primarily R wrist, MCP joints, limiting pt ability to move wrist/fingers.  Pt able move elbow/shoulder w/ minimal pain.  MRI/Csp reviewed w/ Dr Andino, normal postsurgical changes, will follow up official report. d/w pt need for better pain control.  Will adjust pain medications for better control, encourge PT, anticipate rehab placement when pain is controlled.  D/w Dr Andino.  Anam Kenney,

## 2017-10-14 LAB
ANION GAP SERPL CALC-SCNC: 13 MMOL/L — SIGNIFICANT CHANGE UP (ref 5–17)
BASOPHILS # BLD AUTO: 0.1 K/UL — SIGNIFICANT CHANGE UP (ref 0–0.2)
BASOPHILS NFR BLD AUTO: 0.5 % — SIGNIFICANT CHANGE UP (ref 0–2)
BUN SERPL-MCNC: 17 MG/DL — SIGNIFICANT CHANGE UP (ref 7–23)
CALCIUM SERPL-MCNC: 9.5 MG/DL — SIGNIFICANT CHANGE UP (ref 8.4–10.5)
CHLORIDE SERPL-SCNC: 101 MMOL/L — SIGNIFICANT CHANGE UP (ref 96–108)
CO2 SERPL-SCNC: 24 MMOL/L — SIGNIFICANT CHANGE UP (ref 22–31)
CREAT SERPL-MCNC: 0.71 MG/DL — SIGNIFICANT CHANGE UP (ref 0.5–1.3)
EOSINOPHIL # BLD AUTO: 0 K/UL — SIGNIFICANT CHANGE UP (ref 0–0.5)
EOSINOPHIL NFR BLD AUTO: 0 % — SIGNIFICANT CHANGE UP (ref 0–6)
GLUCOSE SERPL-MCNC: 247 MG/DL — HIGH (ref 70–99)
HCT VFR BLD CALC: 37.9 % — SIGNIFICANT CHANGE UP (ref 34.5–45)
HGB BLD-MCNC: 13.4 G/DL — SIGNIFICANT CHANGE UP (ref 11.5–15.5)
LYMPHOCYTES # BLD AUTO: 1.9 K/UL — SIGNIFICANT CHANGE UP (ref 1–3.3)
LYMPHOCYTES # BLD AUTO: 12.3 % — LOW (ref 13–44)
MCHC RBC-ENTMCNC: 32.7 PG — SIGNIFICANT CHANGE UP (ref 27–34)
MCHC RBC-ENTMCNC: 35.3 GM/DL — SIGNIFICANT CHANGE UP (ref 32–36)
MCV RBC AUTO: 92.6 FL — SIGNIFICANT CHANGE UP (ref 80–100)
MONOCYTES # BLD AUTO: 0.3 K/UL — SIGNIFICANT CHANGE UP (ref 0–0.9)
MONOCYTES NFR BLD AUTO: 2.1 % — SIGNIFICANT CHANGE UP (ref 2–14)
NEUTROPHILS # BLD AUTO: 13.5 K/UL — HIGH (ref 1.8–7.4)
NEUTROPHILS NFR BLD AUTO: 85.2 % — HIGH (ref 43–77)
PLATELET # BLD AUTO: 278 K/UL — SIGNIFICANT CHANGE UP (ref 150–400)
POTASSIUM SERPL-MCNC: 4.2 MMOL/L — SIGNIFICANT CHANGE UP (ref 3.5–5.3)
POTASSIUM SERPL-SCNC: 4.2 MMOL/L — SIGNIFICANT CHANGE UP (ref 3.5–5.3)
RBC # BLD: 4.09 M/UL — SIGNIFICANT CHANGE UP (ref 3.8–5.2)
RBC # FLD: 12.2 % — SIGNIFICANT CHANGE UP (ref 10.3–14.5)
SODIUM SERPL-SCNC: 138 MMOL/L — SIGNIFICANT CHANGE UP (ref 135–145)
WBC # BLD: 15.8 K/UL — HIGH (ref 3.8–10.5)
WBC # FLD AUTO: 15.8 K/UL — HIGH (ref 3.8–10.5)

## 2017-10-14 RX ORDER — ACETAMINOPHEN 500 MG
650 TABLET ORAL EVERY 6 HOURS
Qty: 0 | Refills: 0 | Status: DISCONTINUED | OUTPATIENT
Start: 2017-10-14 | End: 2017-10-16

## 2017-10-14 RX ADMIN — ONDANSETRON 4 MILLIGRAM(S): 8 TABLET, FILM COATED ORAL at 23:48

## 2017-10-14 RX ADMIN — Medication 100 MILLIGRAM(S): at 05:38

## 2017-10-14 RX ADMIN — OXYCODONE HYDROCHLORIDE 10 MILLIGRAM(S): 5 TABLET ORAL at 23:47

## 2017-10-14 RX ADMIN — Medication 3 MILLIGRAM(S): at 12:16

## 2017-10-14 RX ADMIN — GABAPENTIN 300 MILLIGRAM(S): 400 CAPSULE ORAL at 13:50

## 2017-10-14 RX ADMIN — Medication 650 MILLIGRAM(S): at 12:15

## 2017-10-14 RX ADMIN — OXYCODONE HYDROCHLORIDE 10 MILLIGRAM(S): 5 TABLET ORAL at 20:15

## 2017-10-14 RX ADMIN — GABAPENTIN 300 MILLIGRAM(S): 400 CAPSULE ORAL at 21:43

## 2017-10-14 RX ADMIN — Medication 3 MILLIGRAM(S): at 00:56

## 2017-10-14 RX ADMIN — Medication 100 MILLIGRAM(S): at 21:43

## 2017-10-14 RX ADMIN — AMLODIPINE BESYLATE 10 MILLIGRAM(S): 2.5 TABLET ORAL at 05:38

## 2017-10-14 RX ADMIN — Medication 3 MILLIGRAM(S): at 17:14

## 2017-10-14 RX ADMIN — OXYCODONE HYDROCHLORIDE 10 MILLIGRAM(S): 5 TABLET ORAL at 17:12

## 2017-10-14 RX ADMIN — Medication 3 MILLIGRAM(S): at 05:38

## 2017-10-14 RX ADMIN — GABAPENTIN 300 MILLIGRAM(S): 400 CAPSULE ORAL at 05:38

## 2017-10-14 RX ADMIN — Medication 1 MILLIGRAM(S): at 23:47

## 2017-10-14 RX ADMIN — Medication 100 MILLIGRAM(S): at 13:50

## 2017-10-14 RX ADMIN — SENNA PLUS 2 TABLET(S): 8.6 TABLET ORAL at 21:43

## 2017-10-14 RX ADMIN — Medication 650 MILLIGRAM(S): at 12:45

## 2017-10-14 RX ADMIN — OXYCODONE HYDROCHLORIDE 10 MILLIGRAM(S): 5 TABLET ORAL at 20:45

## 2017-10-14 RX ADMIN — Medication 5 MILLIGRAM(S): at 05:38

## 2017-10-14 RX ADMIN — PANTOPRAZOLE SODIUM 40 MILLIGRAM(S): 20 TABLET, DELAYED RELEASE ORAL at 05:38

## 2017-10-14 RX ADMIN — OXYCODONE HYDROCHLORIDE 10 MILLIGRAM(S): 5 TABLET ORAL at 17:45

## 2017-10-14 RX ADMIN — OXYCODONE HYDROCHLORIDE 10 MILLIGRAM(S): 5 TABLET ORAL at 05:51

## 2017-10-14 RX ADMIN — Medication 5 MILLIGRAM(S): at 00:56

## 2017-10-14 NOTE — OCCUPATIONAL THERAPY INITIAL EVALUATION ADULT - RANGE OF MOTION EXAMINATION, UPPER EXTREMITY
LUE shoulder FF to 80 degrees, AROM all other joints WFL; RUE: shoulder FF 30 degrees, elbow/wrist/hand 1/2 AROM, AAROM WFL

## 2017-10-14 NOTE — OCCUPATIONAL THERAPY INITIAL EVALUATION ADULT - PLANNED THERAPY INTERVENTIONS, OT EVAL
ROM/strengthening/ADL retraining/bed mobility training/balance training/fine motor coordination training/parent/caregiver training.../neuromuscular re-education/transfer training

## 2017-10-14 NOTE — OCCUPATIONAL THERAPY INITIAL EVALUATION ADULT - DIAGNOSIS, OT EVAL
decreased UE strength, endurance and balance limiting ADLS and mobility decreased UE strength, sensation., endurance and balance limiting ADLS and mobility

## 2017-10-14 NOTE — PROGRESS NOTE ADULT - ASSESSMENT
Patient is a 47y old  Female who presents with a chief complaint of Neck,  right shoulder & arm pain (11 Oct 2017 10:19) s/p C6-C7 ACDF. Pt stable.

## 2017-10-15 RX ORDER — SIMETHICONE 80 MG/1
80 TABLET, CHEWABLE ORAL
Qty: 0 | Refills: 0 | Status: DISCONTINUED | OUTPATIENT
Start: 2017-10-15 | End: 2017-10-16

## 2017-10-15 RX ADMIN — Medication 650 MILLIGRAM(S): at 11:48

## 2017-10-15 RX ADMIN — Medication 100 MILLIGRAM(S): at 06:11

## 2017-10-15 RX ADMIN — Medication 1 MILLIGRAM(S): at 11:12

## 2017-10-15 RX ADMIN — OXYCODONE HYDROCHLORIDE 10 MILLIGRAM(S): 5 TABLET ORAL at 21:50

## 2017-10-15 RX ADMIN — Medication 650 MILLIGRAM(S): at 02:36

## 2017-10-15 RX ADMIN — Medication 1 MILLIGRAM(S): at 06:11

## 2017-10-15 RX ADMIN — OXYCODONE HYDROCHLORIDE 10 MILLIGRAM(S): 5 TABLET ORAL at 19:03

## 2017-10-15 RX ADMIN — Medication 100 MILLIGRAM(S): at 15:24

## 2017-10-15 RX ADMIN — OXYCODONE HYDROCHLORIDE 10 MILLIGRAM(S): 5 TABLET ORAL at 11:48

## 2017-10-15 RX ADMIN — OXYCODONE HYDROCHLORIDE 10 MILLIGRAM(S): 5 TABLET ORAL at 06:41

## 2017-10-15 RX ADMIN — OXYCODONE HYDROCHLORIDE 10 MILLIGRAM(S): 5 TABLET ORAL at 00:17

## 2017-10-15 RX ADMIN — OXYCODONE HYDROCHLORIDE 10 MILLIGRAM(S): 5 TABLET ORAL at 06:11

## 2017-10-15 RX ADMIN — Medication 1 MILLIGRAM(S): at 18:25

## 2017-10-15 RX ADMIN — Medication 650 MILLIGRAM(S): at 11:09

## 2017-10-15 RX ADMIN — OXYCODONE HYDROCHLORIDE 10 MILLIGRAM(S): 5 TABLET ORAL at 15:54

## 2017-10-15 RX ADMIN — Medication 100 MILLIGRAM(S): at 21:50

## 2017-10-15 RX ADMIN — PANTOPRAZOLE SODIUM 40 MILLIGRAM(S): 20 TABLET, DELAYED RELEASE ORAL at 06:11

## 2017-10-15 RX ADMIN — Medication 650 MILLIGRAM(S): at 02:06

## 2017-10-15 RX ADMIN — OXYCODONE HYDROCHLORIDE 10 MILLIGRAM(S): 5 TABLET ORAL at 11:11

## 2017-10-15 RX ADMIN — SENNA PLUS 2 TABLET(S): 8.6 TABLET ORAL at 21:49

## 2017-10-15 RX ADMIN — OXYCODONE HYDROCHLORIDE 10 MILLIGRAM(S): 5 TABLET ORAL at 22:20

## 2017-10-15 RX ADMIN — GABAPENTIN 300 MILLIGRAM(S): 400 CAPSULE ORAL at 15:24

## 2017-10-15 RX ADMIN — AMLODIPINE BESYLATE 10 MILLIGRAM(S): 2.5 TABLET ORAL at 06:11

## 2017-10-15 RX ADMIN — OXYCODONE HYDROCHLORIDE 10 MILLIGRAM(S): 5 TABLET ORAL at 18:25

## 2017-10-15 RX ADMIN — GABAPENTIN 300 MILLIGRAM(S): 400 CAPSULE ORAL at 06:11

## 2017-10-15 RX ADMIN — GABAPENTIN 300 MILLIGRAM(S): 400 CAPSULE ORAL at 21:50

## 2017-10-15 RX ADMIN — OXYCODONE HYDROCHLORIDE 10 MILLIGRAM(S): 5 TABLET ORAL at 15:26

## 2017-10-16 ENCOUNTER — TRANSCRIPTION ENCOUNTER (OUTPATIENT)
Age: 47
End: 2017-10-16

## 2017-10-16 VITALS
SYSTOLIC BLOOD PRESSURE: 130 MMHG | HEART RATE: 62 BPM | DIASTOLIC BLOOD PRESSURE: 69 MMHG | TEMPERATURE: 98 F | OXYGEN SATURATION: 95 % | RESPIRATION RATE: 18 BRPM

## 2017-10-16 LAB — SURGICAL PATHOLOGY STUDY: SIGNIFICANT CHANGE UP

## 2017-10-16 PROCEDURE — 88304 TISSUE EXAM BY PATHOLOGIST: CPT

## 2017-10-16 PROCEDURE — 97530 THERAPEUTIC ACTIVITIES: CPT

## 2017-10-16 PROCEDURE — 72125 CT NECK SPINE W/O DYE: CPT

## 2017-10-16 PROCEDURE — 86901 BLOOD TYPING SEROLOGIC RH(D): CPT

## 2017-10-16 PROCEDURE — 97110 THERAPEUTIC EXERCISES: CPT

## 2017-10-16 PROCEDURE — 97162 PT EVAL MOD COMPLEX 30 MIN: CPT

## 2017-10-16 PROCEDURE — C1713: CPT

## 2017-10-16 PROCEDURE — 86900 BLOOD TYPING SEROLOGIC ABO: CPT

## 2017-10-16 PROCEDURE — 97166 OT EVAL MOD COMPLEX 45 MIN: CPT

## 2017-10-16 PROCEDURE — 88311 DECALCIFY TISSUE: CPT

## 2017-10-16 PROCEDURE — 76000 FLUOROSCOPY <1 HR PHYS/QHP: CPT

## 2017-10-16 PROCEDURE — 72141 MRI NECK SPINE W/O DYE: CPT

## 2017-10-16 PROCEDURE — 80048 BASIC METABOLIC PNL TOTAL CA: CPT

## 2017-10-16 PROCEDURE — 94640 AIRWAY INHALATION TREATMENT: CPT

## 2017-10-16 PROCEDURE — 85027 COMPLETE CBC AUTOMATED: CPT

## 2017-10-16 PROCEDURE — 97116 GAIT TRAINING THERAPY: CPT

## 2017-10-16 PROCEDURE — C1889: CPT

## 2017-10-16 RX ORDER — GABAPENTIN 400 MG/1
1 CAPSULE ORAL
Qty: 0 | Refills: 0 | COMMUNITY

## 2017-10-16 RX ORDER — FAMOTIDINE 10 MG/ML
1 INJECTION INTRAVENOUS
Qty: 0 | Refills: 0 | COMMUNITY
Start: 2017-10-16

## 2017-10-16 RX ORDER — OXYCODONE HYDROCHLORIDE 5 MG/1
1 TABLET ORAL
Qty: 0 | Refills: 0 | COMMUNITY
Start: 2017-10-16

## 2017-10-16 RX ORDER — SENNA PLUS 8.6 MG/1
2 TABLET ORAL
Qty: 0 | Refills: 0 | COMMUNITY
Start: 2017-10-16

## 2017-10-16 RX ORDER — PANTOPRAZOLE SODIUM 20 MG/1
1 TABLET, DELAYED RELEASE ORAL
Qty: 0 | Refills: 0 | COMMUNITY
Start: 2017-10-16

## 2017-10-16 RX ORDER — SIMETHICONE 80 MG/1
1 TABLET, CHEWABLE ORAL
Qty: 0 | Refills: 0 | COMMUNITY
Start: 2017-10-16

## 2017-10-16 RX ORDER — GABAPENTIN 400 MG/1
1 CAPSULE ORAL
Qty: 0 | Refills: 0 | DISCHARGE
Start: 2017-10-16

## 2017-10-16 RX ORDER — DOCUSATE SODIUM 100 MG
1 CAPSULE ORAL
Qty: 0 | Refills: 0 | COMMUNITY
Start: 2017-10-16

## 2017-10-16 RX ORDER — ACETAMINOPHEN 500 MG
2 TABLET ORAL
Qty: 0 | Refills: 0 | COMMUNITY
Start: 2017-10-16

## 2017-10-16 RX ORDER — DIAZEPAM 5 MG
1 TABLET ORAL
Qty: 0 | Refills: 0 | COMMUNITY
Start: 2017-10-16

## 2017-10-16 RX ORDER — OMEPRAZOLE 10 MG/1
1 CAPSULE, DELAYED RELEASE ORAL
Qty: 0 | Refills: 0 | COMMUNITY

## 2017-10-16 RX ADMIN — OXYCODONE HYDROCHLORIDE 10 MILLIGRAM(S): 5 TABLET ORAL at 00:57

## 2017-10-16 RX ADMIN — Medication 100 MILLIGRAM(S): at 05:20

## 2017-10-16 RX ADMIN — AMLODIPINE BESYLATE 10 MILLIGRAM(S): 2.5 TABLET ORAL at 05:20

## 2017-10-16 RX ADMIN — GABAPENTIN 300 MILLIGRAM(S): 400 CAPSULE ORAL at 13:33

## 2017-10-16 RX ADMIN — OXYCODONE HYDROCHLORIDE 10 MILLIGRAM(S): 5 TABLET ORAL at 00:27

## 2017-10-16 RX ADMIN — OXYCODONE HYDROCHLORIDE 10 MILLIGRAM(S): 5 TABLET ORAL at 14:56

## 2017-10-16 RX ADMIN — PANTOPRAZOLE SODIUM 40 MILLIGRAM(S): 20 TABLET, DELAYED RELEASE ORAL at 05:20

## 2017-10-16 RX ADMIN — OXYCODONE HYDROCHLORIDE 10 MILLIGRAM(S): 5 TABLET ORAL at 05:01

## 2017-10-16 RX ADMIN — OXYCODONE HYDROCHLORIDE 10 MILLIGRAM(S): 5 TABLET ORAL at 11:00

## 2017-10-16 RX ADMIN — Medication 100 MILLIGRAM(S): at 13:33

## 2017-10-16 RX ADMIN — OXYCODONE HYDROCHLORIDE 10 MILLIGRAM(S): 5 TABLET ORAL at 04:31

## 2017-10-16 RX ADMIN — OXYCODONE HYDROCHLORIDE 10 MILLIGRAM(S): 5 TABLET ORAL at 10:22

## 2017-10-16 RX ADMIN — GABAPENTIN 300 MILLIGRAM(S): 400 CAPSULE ORAL at 05:20

## 2017-10-16 RX ADMIN — OXYCODONE HYDROCHLORIDE 10 MILLIGRAM(S): 5 TABLET ORAL at 15:30

## 2017-10-16 RX ADMIN — Medication 5 MILLIGRAM(S): at 17:02

## 2017-10-16 NOTE — PROVIDER CONTACT NOTE (OTHER) - ASSESSMENT
Pt abruptly woken up after feeling like something is stuck in her throat. Pt coughing heavily & unable to catch her breathe. 2L O2 placed on pt. Pt denies chest pain; states "I think this happened because I slept with my mouth open this morning." Pt VSS. HOB elevated. Daughter at bedside to aide in translating.

## 2017-10-16 NOTE — DISCHARGE NOTE ADULT - PATIENT PORTAL LINK FT
“You can access the [facility] Patient Portal, offered by [facility], by registering with the following website: [email or web address]”

## 2017-10-16 NOTE — PROGRESS NOTE ADULT - ATTENDING COMMENTS
I examined and evaluated patient I agree with above. D/C rehab.
I examined and evaluated patient I agree with above. D/C rehab.
I examined and evaluated patient, since yesterday she is moving her LUE which is improved.  Her preop neck pain and RUE radiculopathy is improved as per patient. She does have paresthesias in Right hand and decreased sensation which is new since surgery, I do think this is postop in nature and the region is inflamed, that will resolve with time. CT and MRI - Show excellent instrumentation placement and excellent decompression at C6C7 level. I reviewed this with patient , she is pleased with care and surgical intervention.

## 2017-10-16 NOTE — PROGRESS NOTE ADULT - SUBJECTIVE AND OBJECTIVE BOX
Patient is a 47y old  Female who presents with a chief complaint of Neck,  right shoulder & arm pain (11 Oct 2017 10:19)    POST OPERATIVE DAY #:  [3]   Patient comfortable  c/o pain to touch on ulnar distrubution of right hand    T(C): 36.9 (10-14-17 @ 05:36), Max: 36.9 (10-14-17 @ 05:36)  HR: 92 (10-14-17 @ 05:36) (70 - 92)  BP: 147/80 (10-14-17 @ 05:36) (116/72 - 150/84)  RR: 18 (10-14-17 @ 05:36) (18 - 18)  SpO2: 96% (10-14-17 @ 05:36) (93% - 96%)    PHYSICAL EXAM:  NAD, Alert  Neck: anterior Dressing C/D/I; in aspen collar, sensation grossly intact to light touch; (+) Distal Pulses; No Calf tenderness B/L, PAS         Upper extremity                             Bi          Tri        Delt                                                    R         4/5        4/5        4/5                                               L          5/5        5/5        5/5          LABS:                        11.8   19.58 )-----------( 275      ( 13 Oct 2017 08:37 )             34.9     10-13    140  |  104  |  14  ----------------------------<  113<H>  4.1   |  26  |  0.62    Ca    8.6      13 Oct 2017 08:35          RADIOLOGY & ADDITIONAL TESTS:
POD # 1 s/p ACDF C67    S: Preop radiculopathy has not resolved. Patient has Right sided radiculopathy in similar distribution from prior to surgery.  She had Left sided wrist pain. Numbness and tingling as well.  Difficulty with movement of both upper extremities secondary to pain.   O: AVSS afebrile    PE:  Deltoid/Biceps/Triceps/ Right side - weak and poor effort due to pain, with effort 3-4/5, Left side - weak with poor effort due to pain, but with effort 4/5, Incision - Dry, no hematoma, soft.    A/P:    46 yo female s/p ACDF with radiculopathy and weakness in similiar distribution as prior to surgery, I counseled patient that symptoms should have dissipated at this point, recommend MRI of C Spine with/without contrast and CT of C Spine. Will re-evaluate later this PM after imaging.
POST OPERATIVE DAY #:  [5]   Patient comfortable;   c/o pain to touch on ulnar distrubution of right hand - pain much improved from yesterday. Pt still feels weak in R wrist/hand but overall improved from before.     Vital Signs Last 24 Hrs  T(C): 36.4 (16 Oct 2017 04:02), Max: 36.8 (15 Oct 2017 21:21)  T(F): 97.5 (16 Oct 2017 04:02), Max: 98.2 (15 Oct 2017 21:21)  HR: 59 (16 Oct 2017 04:02) (59 - 80)  BP: 144/93 (16 Oct 2017 04:02) (123/81 - 144/93)  BP(mean): --  RR: 20 (16 Oct 2017 04:02) (18 - 20)  SpO2: 96% (16 Oct 2017 04:02) (95% - 97%)    PHYSICAL EXAM:  NAD, Alert  Neck: anterior incision c/d/i; in aspen collar, sensation: dec sensation ulnar distribution RUE; Neg Perez's bilaterally; (+) Distal Pulses; No Calf tenderness B/L, PAS         Upper extremity                             Bi          Tri        Delt     WF      WE                                                  R         4/5        4/5        4/5     3/5      3/5	                                               L          5/5        5/5        5/5     5/5      5/5
Patient is a 47y old  Female who presents with a chief complaint of Neck,  right shoulder & arm pain (11 Oct 2017 10:19)      POST OPERATIVE DAY #:  [2 ]   Patient anxious; appears to NOT want to want move er upper extremities 2/2 pain; still c/o numbness and weakness R Arm   @ bedside    T(C): 37 (10-13-17 @ 05:40), Max: 37 (10-13-17 @ 05:40)  HR: 78 (10-13-17 @ 05:40) (77 - 103)  BP: 128/76 (10-13-17 @ 05:40) (98/59 - 128/76)  RR: 18 (10-13-17 @ 05:40) (18 - 18)  SpO2: 95% (10-13-17 @ 05:40) (92% - 97%)  Wt(kg): --    PHYSICAL EXAM:  NAD, Alert  collar in place  dsg changed: no drainage noted       [ x] Upper extremity                           Bi          Tri        Delt                                                       R         3/5        3/5        2-3/5                                                   L          4/5        45        4/5           mildly decr sensation R Arm w/ swelling noted      LABS:                        13.0   15.12 )-----------( 309      ( 12 Oct 2017 08:10 )             37.7     10-12    139  |  103  |  10  ----------------------------<  182<H>  3.8   |  20<L>  |  0.76    Ca    9.2      12 Oct 2017 08:48
Patient is a 47y old  Female who presents with a chief complaint of Neck,  right shoulder & arm pain (11 Oct 2017 10:19)    POST OPERATIVE DAY #:  [4]   Patient comfortable;   c/o pain to touch on ulnar distrubution of right hand - pain much improved from yesterday    Vital Signs Last 24 Hrs  T(C): 36.6 (15 Oct 2017 08:33), Max: 36.8 (14 Oct 2017 20:52)  T(F): 97.9 (15 Oct 2017 08:33), Max: 98.2 (14 Oct 2017 20:52)  HR: 72 (15 Oct 2017 08:33) (69 - 76)  BP: 143/82 (15 Oct 2017 08:33) (118/75 - 154/90)  BP(mean): --  RR: 18 (15 Oct 2017 08:33) (18 - 18)  SpO2: 96% (15 Oct 2017 08:33) (94% - 96%)    PHYSICAL EXAM:  NAD, Alert  Neck: anterior Dressing C/D/I; in aspen collar, sensation grossly intact to light touch; (+) Distal Pulses; No Calf tenderness B/L, PAS         Upper extremity                             Bi          Tri        Delt                                                    R         4/5        4/5        4/5                                               L          5/5        5/5        5/5          LABS:                        11.8   19.58 )-----------( 275      ( 13 Oct 2017 08:37 )             34.9     10-13    140  |  104  |  14  ----------------------------<  113<H>  4.1   |  26  |  0.62    Ca    8.6      13 Oct 2017 08:35          RADIOLOGY & ADDITIONAL TESTS:
Patient seen and examined at bedside.  States her pain is 7/10.  She states she feels short of breath.  Currently O2 sat 92% on 2 L via NC.  She does not want to move her BLUE secondary to pain.    Vital Signs Last 24 Hrs  T(C): 36.8 (12 Oct 2017 05:45), Max: 37 (11 Oct 2017 22:18)  T(F): 98.2 (12 Oct 2017 05:45), Max: 98.6 (11 Oct 2017 22:18)  HR: 104 (12 Oct 2017 06:12) (55 - 104)  BP: 126/76 (12 Oct 2017 06:12) (107/60 - 137/88)  BP(mean): 83 (11 Oct 2017 21:00) (78 - 83)  RR: 18 (12 Oct 2017 05:45) (14 - 20)  SpO2: 93% (12 Oct 2017 05:45) (93% - 100%)    Incision clean and intact, dressing changed.  No fluctuance or hematoma  Patient in [facility]  Unable to assess Neuro muscular status secondary to pain and refusal to move upper extremities

## 2017-10-16 NOTE — DISCHARGE NOTE ADULT - HOSPITAL COURSE
History of Present Illness		  46 Y/O [place] speaking female Reports she fell down stairs about 17 months ago. C/O Neck pain radiating down right arm , 5 out of 10 .  Seen by MD.  S/P MRI , & CT neck. Pt tried PT, spinal injections with little relief. Presents for surgeryC6-7 Anterior cervical Discectomy and Fusion.       Admitted for elective surgery on 10/11/17.  S/P C6-7 anterior cervical discectomy fusion.  Patient tolerated procedure well.  Post-op patient c/o more pain and upper extremity weakness?? increased.  MRI and CT performed on 10/12/17.  Both studies showed good alignment of hardware with no impingement of nerves.  Patient placed on steroid taper with improvement.  Physical therapy for ambulation WBAT.  [place] collar at all times.  PT recommended rehab.  Will transfer when bed available.

## 2017-10-16 NOTE — DISCHARGE NOTE ADULT - CARE PROVIDER_API CALL
Gabriel Andino (MD), Orthopedics  [address] [address]  Phone: [phone]  Fax: [phone]

## 2017-10-16 NOTE — DISCHARGE NOTE ADULT - MEDICATION SUMMARY - MEDICATIONS TO TAKE
I will START or STAY ON the medications listed below when I get home from the hospital:    [place] Collar  -- Dx: s/p C6-C7 ACDF  -- Indication: For Support    oxyCODONE 5 mg oral tablet  -- 1 tab(s) by mouth every 3 hours, As needed, Mild Pain (1 - 3)  -- Indication: For pain mgt    oxyCODONE 10 mg oral tablet  -- 1 tab(s) by mouth every 3 hours, As needed, Moderate Pain (4 - 6)  -- Indication: For pain mgt    acetaminophen 325 mg oral tablet  -- 2 tab(s) by mouth every 6 hours, As needed, For Temp greater than 38 C (100.4 F)  -- Indication: For temp    acetaminophen 325 mg oral tablet  -- 2 tab(s) by mouth every 6 hours, As needed, Mild Pain (1 - 3) or HA  -- Indication: For pain mgt    gabapentin 300 mg oral capsule  -- 1 cap(s) by mouth 3 times a day  -- Indication: For pain mgt    diazePAM 5 mg oral tablet  -- 1 tab(s) by mouth every 12 hours, As needed, Anxiety  -- Indication: For anxiety    amLODIPine  -- orally once a day  -- Indication: For Hypertension    famotidine 20 mg oral tablet  -- 1 tab(s) by mouth every 12 hours, As needed, Dyspepsia  -- Indication: For GI    docusate sodium 100 mg oral capsule  -- 1 cap(s) by mouth 3 times a day  -- Indication: For laxative    senna oral tablet  -- 2 tab(s) by mouth once a day (at bedtime)  -- Indication: For laxative    simethicone 80 mg oral tablet, chewable  -- 1 tab(s) by mouth 2 times a day, As needed, Indigestion  -- Indication: For indigestion    pantoprazole 40 mg oral delayed release tablet  -- 1 tab(s) by mouth once a day (before a meal)  -- Indication: For GI

## 2017-10-16 NOTE — DISCHARGE NOTE ADULT - NS AS ACTIVITY OBS
Do not drive or operate machinery/Walking-Outdoors allowed/Walking-Indoors allowed/Do not make important decisions/Stairs allowed/sponge bathe until f/u with Dr Andino within 7 - 10 days/No Heavy lifting/straining

## 2017-10-16 NOTE — DISCHARGE NOTE ADULT - CARE PROVIDERS DIRECT ADDRESSES
[email or web address]

## 2017-10-16 NOTE — PROGRESS NOTE ADULT - PROBLEM SELECTOR PROBLEM 1
Cervical disc displacement

## 2017-10-16 NOTE — DISCHARGE NOTE ADULT - ADDITIONAL INSTRUCTIONS
F/U with Dr Andino within 7 - 10 days.     Please f/u with your primary doctor after discharge from the hospital to discuss your hospital stay and any changes to your medication

## 2017-10-16 NOTE — PROGRESS NOTE ADULT - PROBLEM SELECTOR PLAN 1
- Pain control  - WBAT/OOB  - Julianne, IS  - Continue current tx     Ar Lui PA-C  Orthopedic Surgery  Pager: [phone]  Spectra: [phone]
***See Above  Edis SOLORIO  Orthopedics  B: [phone]  S: [phone]
- Pain control  - WBAT/OOB  - Julianne, IS  - Continue current tx     Ar Lui PA-C  Orthopedic Surgery  Pager: [phone]  Spectra: [phone]
- Pain control  - WBAT/OOB  -PT  - Julianne, IS  - Continue current tx   -Dispo planning

## 2017-10-16 NOTE — PROVIDER CONTACT NOTE (OTHER) - ACTION/TREATMENT ORDERED:
Anne Marie DO made aware; states "If her vitals are ok, there's nothing else I can do, we will round on her this morning." Tea provided to pt to aide in soothing throat. HOB remain elevated

## 2017-10-16 NOTE — DISCHARGE NOTE ADULT - PLAN OF CARE
resume ADL without pain.  increase strength upper extremities F/U with Dr Andino within 7 - 10 days.  Please see attached post-op instructions.  Physical therapy for ambulation WBAT.  [place] collar at all times.

## 2017-10-16 NOTE — DISCHARGE NOTE ADULT - CARE PLAN
Principal Discharge DX:	Cervical radiculopathy  Goal:	resume ADL without pain.  increase strength upper extremities  Instructions for follow-up, activity and diet:	F/U with Dr Andino within 7 - 10 days.  Please see attached post-op instructions.  Physical therapy for ambulation WBAT.  [place] collar at all times.

## 2017-10-24 ENCOUNTER — EMERGENCY (EMERGENCY)
Facility: HOSPITAL | Age: 47
LOS: 1 days | Discharge: HOME | End: 2017-10-24
Attending: EMERGENCY MEDICINE | Admitting: EMERGENCY MEDICINE
Payer: MEDICAID

## 2017-10-24 ENCOUNTER — EMERGENCY (EMERGENCY)
Facility: HOSPITAL | Age: 47
LOS: 1 days | Discharge: HOME | End: 2017-10-24
Admitting: INTERNAL MEDICINE
Payer: MEDICAID

## 2017-10-24 VITALS
HEART RATE: 98 BPM | DIASTOLIC BLOOD PRESSURE: 105 MMHG | OXYGEN SATURATION: 96 % | SYSTOLIC BLOOD PRESSURE: 168 MMHG | RESPIRATION RATE: 16 BRPM

## 2017-10-24 DIAGNOSIS — Z98.51 TUBAL LIGATION STATUS: Chronic | ICD-10-CM

## 2017-10-24 DIAGNOSIS — Z90.49 ACQUIRED ABSENCE OF OTHER SPECIFIED PARTS OF DIGESTIVE TRACT: Chronic | ICD-10-CM

## 2017-10-24 DIAGNOSIS — Z30.430 ENCOUNTER FOR INSERTION OF INTRAUTERINE CONTRACEPTIVE DEVICE: Chronic | ICD-10-CM

## 2017-10-24 DIAGNOSIS — M50.20 OTHER CERVICAL DISC DISPLACEMENT, UNSPECIFIED CERVICAL REGION: ICD-10-CM

## 2017-10-24 LAB
ALBUMIN SERPL ELPH-MCNC: 3.6 G/DL — SIGNIFICANT CHANGE UP (ref 3.3–5)
ALP SERPL-CCNC: 60 U/L — SIGNIFICANT CHANGE UP (ref 40–120)
ALT FLD-CCNC: 32 U/L RC — SIGNIFICANT CHANGE UP (ref 10–45)
ANION GAP SERPL CALC-SCNC: 13 MMOL/L — SIGNIFICANT CHANGE UP (ref 5–17)
AST SERPL-CCNC: 29 U/L — SIGNIFICANT CHANGE UP (ref 10–40)
BASOPHILS # BLD AUTO: 0.1 K/UL — SIGNIFICANT CHANGE UP (ref 0–0.2)
BASOPHILS NFR BLD AUTO: 1 % — SIGNIFICANT CHANGE UP (ref 0–2)
BILIRUB SERPL-MCNC: 0.5 MG/DL — SIGNIFICANT CHANGE UP (ref 0.2–1.2)
BUN SERPL-MCNC: 4 MG/DL — LOW (ref 7–23)
CALCIUM SERPL-MCNC: 8.5 MG/DL — SIGNIFICANT CHANGE UP (ref 8.4–10.5)
CHLORIDE SERPL-SCNC: 103 MMOL/L — SIGNIFICANT CHANGE UP (ref 96–108)
CO2 SERPL-SCNC: 25 MMOL/L — SIGNIFICANT CHANGE UP (ref 22–31)
CREAT SERPL-MCNC: 0.79 MG/DL — SIGNIFICANT CHANGE UP (ref 0.5–1.3)
EOSINOPHIL # BLD AUTO: 0.2 K/UL — SIGNIFICANT CHANGE UP (ref 0–0.5)
EOSINOPHIL NFR BLD AUTO: 2.7 % — SIGNIFICANT CHANGE UP (ref 0–6)
GLUCOSE SERPL-MCNC: 141 MG/DL — HIGH (ref 70–99)
HCT VFR BLD CALC: 38.6 % — SIGNIFICANT CHANGE UP (ref 34.5–45)
HGB BLD-MCNC: 13 G/DL — SIGNIFICANT CHANGE UP (ref 11.5–15.5)
LYMPHOCYTES # BLD AUTO: 2.7 K/UL — SIGNIFICANT CHANGE UP (ref 1–3.3)
LYMPHOCYTES # BLD AUTO: 29.7 % — SIGNIFICANT CHANGE UP (ref 13–44)
MCHC RBC-ENTMCNC: 31 PG — SIGNIFICANT CHANGE UP (ref 27–34)
MCHC RBC-ENTMCNC: 33.7 GM/DL — SIGNIFICANT CHANGE UP (ref 32–36)
MCV RBC AUTO: 92 FL — SIGNIFICANT CHANGE UP (ref 80–100)
MONOCYTES # BLD AUTO: 0.9 K/UL — SIGNIFICANT CHANGE UP (ref 0–0.9)
MONOCYTES NFR BLD AUTO: 9.9 % — SIGNIFICANT CHANGE UP (ref 2–14)
NEUTROPHILS # BLD AUTO: 5.1 K/UL — SIGNIFICANT CHANGE UP (ref 1.8–7.4)
NEUTROPHILS NFR BLD AUTO: 56.7 % — SIGNIFICANT CHANGE UP (ref 43–77)
PLATELET # BLD AUTO: 281 K/UL — SIGNIFICANT CHANGE UP (ref 150–400)
POTASSIUM SERPL-MCNC: 4 MMOL/L — SIGNIFICANT CHANGE UP (ref 3.5–5.3)
POTASSIUM SERPL-SCNC: 4 MMOL/L — SIGNIFICANT CHANGE UP (ref 3.5–5.3)
PROT SERPL-MCNC: 6.7 G/DL — SIGNIFICANT CHANGE UP (ref 6–8.3)
RBC # BLD: 4.2 M/UL — SIGNIFICANT CHANGE UP (ref 3.8–5.2)
RBC # FLD: 12 % — SIGNIFICANT CHANGE UP (ref 10.3–14.5)
S PYO AG SPEC QL IA: NEGATIVE — SIGNIFICANT CHANGE UP
SODIUM SERPL-SCNC: 141 MMOL/L — SIGNIFICANT CHANGE UP (ref 135–145)
WBC # BLD: 9 K/UL — SIGNIFICANT CHANGE UP (ref 3.8–10.5)
WBC # FLD AUTO: 9 K/UL — SIGNIFICANT CHANGE UP (ref 3.8–10.5)

## 2017-10-24 PROCEDURE — 85730 THROMBOPLASTIN TIME PARTIAL: CPT

## 2017-10-24 PROCEDURE — 72125 CT NECK SPINE W/O DYE: CPT

## 2017-10-24 PROCEDURE — 99285 EMERGENCY DEPT VISIT HI MDM: CPT

## 2017-10-24 PROCEDURE — 72125 CT NECK SPINE W/O DYE: CPT | Mod: 26

## 2017-10-24 PROCEDURE — 80048 BASIC METABOLIC PNL TOTAL CA: CPT

## 2017-10-24 PROCEDURE — 85610 PROTHROMBIN TIME: CPT

## 2017-10-24 PROCEDURE — 36415 COLL VENOUS BLD VENIPUNCTURE: CPT

## 2017-10-24 PROCEDURE — 99285 EMERGENCY DEPT VISIT HI MDM: CPT | Mod: 25

## 2017-10-24 PROCEDURE — 99220: CPT

## 2017-10-24 PROCEDURE — 70450 CT HEAD/BRAIN W/O DYE: CPT | Mod: 26

## 2017-10-24 PROCEDURE — 85027 COMPLETE CBC AUTOMATED: CPT

## 2017-10-24 RX ORDER — ACETAMINOPHEN 500 MG
1000 TABLET ORAL ONCE
Qty: 0 | Refills: 0 | Status: COMPLETED | OUTPATIENT
Start: 2017-10-24 | End: 2017-10-24

## 2017-10-24 RX ORDER — SODIUM CHLORIDE 9 MG/ML
1000 INJECTION INTRAMUSCULAR; INTRAVENOUS; SUBCUTANEOUS ONCE
Qty: 0 | Refills: 0 | Status: COMPLETED | OUTPATIENT
Start: 2017-10-24 | End: 2017-10-24

## 2017-10-24 RX ORDER — AMLODIPINE BESYLATE 2.5 MG/1
5 TABLET ORAL DAILY
Qty: 0 | Refills: 0 | Status: DISCONTINUED | OUTPATIENT
Start: 2017-10-24 | End: 2017-10-28

## 2017-10-24 RX ORDER — OXYCODONE HYDROCHLORIDE 5 MG/1
10 TABLET ORAL EVERY 6 HOURS
Qty: 0 | Refills: 0 | Status: DISCONTINUED | OUTPATIENT
Start: 2017-10-24 | End: 2017-10-25

## 2017-10-24 RX ORDER — DOCUSATE SODIUM 100 MG
100 CAPSULE ORAL THREE TIMES A DAY
Qty: 0 | Refills: 0 | Status: DISCONTINUED | OUTPATIENT
Start: 2017-10-24 | End: 2017-10-28

## 2017-10-24 RX ORDER — SIMETHICONE 80 MG/1
80 TABLET, CHEWABLE ORAL
Qty: 0 | Refills: 0 | Status: DISCONTINUED | OUTPATIENT
Start: 2017-10-24 | End: 2017-10-28

## 2017-10-24 RX ORDER — FAMOTIDINE 10 MG/ML
20 INJECTION INTRAVENOUS
Qty: 0 | Refills: 0 | Status: DISCONTINUED | OUTPATIENT
Start: 2017-10-24 | End: 2017-10-28

## 2017-10-24 RX ORDER — ACETAMINOPHEN 500 MG
650 TABLET ORAL EVERY 6 HOURS
Qty: 0 | Refills: 0 | Status: DISCONTINUED | OUTPATIENT
Start: 2017-10-24 | End: 2017-10-28

## 2017-10-24 RX ORDER — GABAPENTIN 400 MG/1
300 CAPSULE ORAL THREE TIMES A DAY
Qty: 0 | Refills: 0 | Status: DISCONTINUED | OUTPATIENT
Start: 2017-10-24 | End: 2017-10-28

## 2017-10-24 RX ORDER — PANTOPRAZOLE SODIUM 20 MG/1
40 TABLET, DELAYED RELEASE ORAL
Qty: 0 | Refills: 0 | Status: DISCONTINUED | OUTPATIENT
Start: 2017-10-24 | End: 2017-10-28

## 2017-10-24 RX ORDER — SODIUM CHLORIDE 9 MG/ML
3 INJECTION INTRAMUSCULAR; INTRAVENOUS; SUBCUTANEOUS EVERY 12 HOURS
Qty: 0 | Refills: 0 | Status: DISCONTINUED | OUTPATIENT
Start: 2017-10-24 | End: 2017-10-28

## 2017-10-24 RX ORDER — SENNA PLUS 8.6 MG/1
2 TABLET ORAL AT BEDTIME
Qty: 0 | Refills: 0 | Status: DISCONTINUED | OUTPATIENT
Start: 2017-10-24 | End: 2017-10-28

## 2017-10-24 RX ADMIN — SODIUM CHLORIDE 2000 MILLILITER(S): 9 INJECTION INTRAMUSCULAR; INTRAVENOUS; SUBCUTANEOUS at 18:45

## 2017-10-24 RX ADMIN — Medication 400 MILLIGRAM(S): at 18:12

## 2017-10-24 RX ADMIN — Medication 1000 MILLIGRAM(S): at 18:45

## 2017-10-24 NOTE — ED PROVIDER NOTE - ATTENDING CONTRIBUTION TO CARE
47F hx recent c6/7 anterior fusion (Andino) c/b residual RUE paresthesias/weakness transferred from [place] ED with new neck pain s/p mounted TV falling on patient at rehab 2 days ago.  +headache.  Additionally, pt reports some difficulty with dysphagia diet. 47F hx recent c6/7 anterior fusion (Andino) c/b residual RUE paresthesias/weakness transferred from [place] ED with new neck pain s/p mounted TV falling on patient at rehab 2 days ago.  +headache.  Additionally, pt reports some difficulty with dysphagia diet.  VSS.  A+Ox3, neuro intact.  miami-j in place, mild midline cervical, R paraspinal an R trap ttp.  healing anterior cervical surgical scar with no dehiscence, discharge, erythema.  Orthospine consult.  likely CDU for MRI. 47F hx recent c6/7 anterior fusion (Andino) c/b residual RUE paresthesias/weakness transferred from [place] ED with new neck pain s/p mounted TV falling on patient at rehab 2 days ago.  +headache.  Additionally, pt reports some difficulty with dysphagia diet.  VSS.  A+Ox3, neuro intact.  miami-j in place, mild midline cervical, R paraspinal an R trap ttp.  healing anterior cervical surgical scar with no dehiscence, discharge, erythema.  lesions to upper labialis, small exudate of R tonsil. strep.  Orthospine consult.  likely CDU for MRI.

## 2017-10-24 NOTE — ED CDU PROVIDER INITIAL DAY NOTE - OBJECTIVE STATEMENT
48 yo F h/o C5/C6 discectomy with anterior fusion 2 weeks ago transferred to rehab because of R arm weakness BIBA after a wall-mounted television fell on the back of her head and neck 2 days ago at facility. Pt reports increased neck pain, paraspinal tenderness, posterior head pain, dysphagia. No visual changes, new focal neurologic deficit, N/V, visible new trauma, fever, chills, confusion, LOC, CP, SOB, abdominal pain, blood in stool or urine. 48 yo F h/o C5/C6 discectomy with anterior fusion 2 weeks ago transferred to rehab because of R arm weakness comes in with c/o neck pain, after a wall-mounted television fell on the back of her head and neck yesterday at facility. Pt reports that yesterday tv fell on back of neck and she had dizziness, N, V x 1 as well as increased neck pain.  All symptoms resolved except for neck pain- which improves with prescribed pain medication.  pt also reports oral cold sores that started 2 days ago and has led to progressive worsening of oral and throat pain with drinking. pt is on liquid diet by her Spine Surgeon and drinking has become more painful.  +chills yesterday.  no other injuries. no other complaints. pt has RUE weakness and numbness since surgery, unchanged after recent neck injury.    No visual changes, new focal neurologic deficit, confusion, LOC, CP, SOB, abdominal pain, 48 yo F h/o C5/C6 discectomy with anterior fusion 2 weeks ago transferred to rehab because of R arm weakness comes in with c/o neck pain, after a wall-mounted television fell on the back of her head and neck yesterday at facility. Pt reports that yesterday tv fell on back of neck and she had dizziness, N, V x 1 as well as increased neck pain.  All symptoms resolved except for neck pain- which improves with prescribed pain medication.  pt also reports oral cold sores that started 2 days ago and has led to progressive worsening of oral and throat pain with drinking. pt is on liquid diet by her Spine Surgeon and drinking has become more painful.  +chills yesterday.  no other injuries. no other complaints. pt has RUE weakness and numbness since surgery, unchanged after recent neck injury.    No visual changes, new focal neurologic deficit, confusion, LOC, CP, SOB, abdominal pain,  pt declined , requests her son to be  for her.

## 2017-10-24 NOTE — ED CDU PROVIDER INITIAL DAY NOTE - ATTENDING CONTRIBUTION TO CARE
47F hx recent c6/7 anterior fusion (Andino) c/b residual RUE paresthesias/weakness transferred from [place] ED with new neck pain s/p mounted TV falling on patient at rehab 2 days ago.  +headache.  Additionally, pt reports some difficulty with dysphagia diet.  VSS.  A+Ox3, neuro intact.  miami-j in place, mild midline cervical, R paraspinal an R trap ttp.  healing anterior cervical surgical scar with no dehiscence, discharge, erythema.  Orthospine consult.  likely CDU for MRI.

## 2017-10-24 NOTE — CHART NOTE - NSCHARTNOTEFT_GEN_A_CORE
Patient is a 47y Female who presents c/o headache after a tv fell on her head on sunday. Denies LOC. Denies pain/injury elsewhere. Denies numbness/tingling/paresthesias/weakness. Denies bowel/bladder incontinence. Denies fevers/chills. No other complaints at this time. Patient recently had an ACDF performed by Dr Andino 2 weeks ago for which she was discharged to rehab and has been tolerating well. denies new pain/numbness/paresthesias with regards to her neck pain. prior to surgery she had RUE radiculopathy and weakness which has since been improving with therapy after surgery. denies any new change in these symptoms. states she has had some trouble swallowing solid food but denies SOB/CP/fever/chills. no other complaints at this time.     HEALTH ISSUES - PROBLEM Dx:          MEDICATIONS  (STANDING):      Allergies    No Known Allergies    Intolerances        PAST MEDICAL & SURGICAL HISTORY:  Cervical radiculopathy  Shoulder impingement, right  Menorrhagia with irregular cycle  Migraines  Gall stones  Hypertension  Cervical disc displacement  Encounter for insertion of mirena IUD: 2017  H/O tubal ligation: 2008 [facility]  S/P laparoscopic cholecystectomy                            13.0   9.0   )-----------( 281      ( 24 Oct 2017 20:11 )             38.6                       Vital Signs Last 24 Hrs  T(C): 36.8 (10-24-17 @ 19:05), Max: 36.8 (10-24-17 @ 19:05)  T(F): 98.3 (10-24-17 @ 19:05), Max: 98.3 (10-24-17 @ 19:05)  HR: 90 (10-24-17 @ 19:05) (90 - 99)  BP: 128/72 (10-24-17 @ 19:05) (128/72 - 168/105)  BP(mean): --  RR: 16 (10-24-17 @ 19:05) (16 - 16)  SpO2: 97% (10-24-17 @ 19:05) (96% - 98%)    Gen: NAD    Spine PE:  Healing anterior cervical transverse incision without erythema/warmth/drainage  +aspen collar in place  No gross deformity  No midline TTP C/T/L/S spine  No bony step offs  No paraspinal muscle ttp/hypertonicity   Negative clonus  Negative najera  Motor:            ElbowFlex    WristExt   ElbowExt   FingerFlex   FingerAbd     R            4/5                4/5            4/5          4/5            5/5  L             5/5               5/5            5/5           5/5         5/5      Sensory:            C5         C6         C7      C8       T1        (0=absent, 1=impaired, 2=normal, NT=not testable)  R       2               2        2         2          2  L          2          2            2      2           2       Imaging: ct: no acute fx, hardware in place    A/P: 47y Female with headaches, sp ACDF  Pain control  WBAT with assistive devices as needed  Aspen collar  increase diet as tolerated   aspiration precautions  SCDs  headache pain control  ct head/c spine negative  no acute ortho surgical intervention  stable for DC, continue routine post operative course  PT  follow up with dr andino as outpatient as previously scheduled

## 2017-10-24 NOTE — ED PROVIDER NOTE - PHYSICAL EXAMINATION
A&Ox3 no apparent distress. Pt wearing [place] J collar since spinal surgery. +ttp midline cervical and R trapezius. No obvious head trauma. Baseline R arm and hand weakness 3/5. Subjective numbness R arm. CN 2-12 grossly intact. PERRLA, EOMI. Heart RRR no murmur. No JVD. No peripheral edema. Lungs CTA b/l no wheezes, rhonchi, rales. Abdomen soft nontender nondistended. Peripheral pulses present and equal b/l. Head NCAT. Skin normal for race.

## 2017-10-24 NOTE — ED PROVIDER NOTE - PSH
Encounter for insertion of mirena IUD  2017  H/O tubal ligation  2008 [facility]  S/P laparoscopic cholecystectomy

## 2017-10-24 NOTE — ED PROVIDER NOTE - OBJECTIVE STATEMENT
46 yo F h/o C5/C6 discectomy with anterior fusion 2 weeks ago transferred to rehab because of R arm weakness BIBA after a wall-mounted television fell on the back of her head and neck 2 days ago at facility. Pt reports increased neck pain, paraspinal tenderness, posterior head pain, dysphagia. No visual changes, new focal neurologic deficit, N/V, visible new trauma, fever, chills, confusion, LOC, CP, SOB, abdominal pain, blood in stool or urine.

## 2017-10-24 NOTE — ED ADULT NURSE NOTE - PSH
Encounter for insertion of mirena IUD  2017  H/O tubal ligation  2008 [facility]  S/P laparoscopic cholecystectomy

## 2017-10-24 NOTE — ED CDU PROVIDER INITIAL DAY NOTE - DETAILS
frequent reeval, vitals q 4hrs, tele, neurochecks q 4hrs, MRI C-spine, Spine following, pain control  d/w attending

## 2017-10-24 NOTE — ED CDU PROVIDER INITIAL DAY NOTE - MEDICAL DECISION MAKING DETAILS
frequent reeval, vitals and neuro checks q 4hrs, tele,  MRI C-spine, Spine following, pain control, eval bedside speech and swallow, follow-up strep, reassess

## 2017-10-24 NOTE — ED CDU PROVIDER INITIAL DAY NOTE - PHYSICAL EXAMINATION
A&Ox3 no apparent distress. Pt wearing miami J collar since spinal surgery. +ttp midline cervical and R trapezius. No obvious head trauma. Baseline R arm and hand weakness 3/5. Subjective numbness R arm. CN 2-12 grossly intact. PERRLA, EOMI. Heart RRR no murmur. No JVD. No peripheral edema. Lungs CTA b/l no wheezes, rhonchi, rales. Abdomen soft nontender nondistended. Peripheral pulses present and equal b/l. Head NCAT. Skin normal for race. Pt wearing miami J collar since spinal surgery. +ttp midline cervical and R trapezius. No obvious head trauma. Baseline R arm and hand weakness 3/5. Subjective numbness R arm. CN 2-12 grossly intact. PERRLA, EOMI. Heart RRR no murmur. No JVD. No peripheral edema. Lungs CTA b/l no wheezes, rhonchi, rales. Abdomen soft nontender nondistended. Peripheral pulses present and equal b/l. Head NCAT. Skin normal for race. Pt wearing miami J collar since spinal surgery. +ttp midline cervical and R trapezius. No obvious head trauma. Baseline R arm and hand weakness 3/5. Subjective numbness R arm. CN 2-12 grossly intact. PERRLA, EOMI. Heart RRR no murmur. No JVD. No peripheral edema. Lungs CTA b/l no wheezes, rhonchi, rales. Abdomen soft nontender nondistended. Peripheral pulses present and equal b/l. Head NCAT. Skin normal for race.  +cold sore at upper lip.   pharynx- clear.

## 2017-10-24 NOTE — ED ADULT NURSE NOTE - OBJECTIVE STATEMENT
48 y/o Female  Sinhala speaking with little English,  provided by Dr. Liu Calvert, pt reports recent  neck surgery  2 weeks ago transferred to rehab because of R arm weakness.  Pt BIB EMS after a wall-mounted television fell on the back of her head and neck 2 days ago at facility.  Pt reports increased neck pain, pain to back of head, dysphagia.  No visual changes, no LOC, no n/v/d,  no visible new trauma,  no fever,  no chills, no abdominal pain, no chest pain.  Ot with [place] J collar in place.  No obvious head trauma,  baseline R arm and hand weakness.  No acute distress noted.

## 2017-10-24 NOTE — ED CDU PROVIDER INITIAL DAY NOTE - PSH
Encounter for insertion of mirena IUD  2017  H/O tubal ligation  2008 [facility]  S/P laparoscopic cholecystectomy

## 2017-10-24 NOTE — ED ADULT TRIAGE NOTE - CHIEF COMPLAINT QUOTE
recent neck surgery by Dr Andino tv fell on neck at rehab on 10/22 pt having trouble swallowing- transfer

## 2017-10-25 VITALS
DIASTOLIC BLOOD PRESSURE: 78 MMHG | OXYGEN SATURATION: 98 % | TEMPERATURE: 98 F | HEART RATE: 99 BPM | RESPIRATION RATE: 17 BRPM | SYSTOLIC BLOOD PRESSURE: 112 MMHG

## 2017-10-25 PROCEDURE — 96375 TX/PRO/DX INJ NEW DRUG ADDON: CPT

## 2017-10-25 PROCEDURE — 87081 CULTURE SCREEN ONLY: CPT

## 2017-10-25 PROCEDURE — 85027 COMPLETE CBC AUTOMATED: CPT

## 2017-10-25 PROCEDURE — 87880 STREP A ASSAY W/OPTIC: CPT

## 2017-10-25 PROCEDURE — 72141 MRI NECK SPINE W/O DYE: CPT

## 2017-10-25 PROCEDURE — 99217: CPT

## 2017-10-25 PROCEDURE — 80053 COMPREHEN METABOLIC PANEL: CPT

## 2017-10-25 PROCEDURE — G0378: CPT

## 2017-10-25 PROCEDURE — 96374 THER/PROPH/DIAG INJ IV PUSH: CPT

## 2017-10-25 PROCEDURE — 99284 EMERGENCY DEPT VISIT MOD MDM: CPT | Mod: 25

## 2017-10-25 PROCEDURE — 72141 MRI NECK SPINE W/O DYE: CPT | Mod: 26

## 2017-10-25 PROCEDURE — 70450 CT HEAD/BRAIN W/O DYE: CPT

## 2017-10-25 RX ADMIN — AMLODIPINE BESYLATE 5 MILLIGRAM(S): 2.5 TABLET ORAL at 07:54

## 2017-10-25 RX ADMIN — GABAPENTIN 300 MILLIGRAM(S): 400 CAPSULE ORAL at 07:54

## 2017-10-25 RX ADMIN — Medication 650 MILLIGRAM(S): at 00:16

## 2017-10-25 RX ADMIN — OXYCODONE HYDROCHLORIDE 10 MILLIGRAM(S): 5 TABLET ORAL at 07:54

## 2017-10-25 RX ADMIN — SODIUM CHLORIDE 3 MILLILITER(S): 9 INJECTION INTRAMUSCULAR; INTRAVENOUS; SUBCUTANEOUS at 06:04

## 2017-10-25 RX ADMIN — PANTOPRAZOLE SODIUM 40 MILLIGRAM(S): 20 TABLET, DELAYED RELEASE ORAL at 07:54

## 2017-10-25 RX ADMIN — OXYCODONE HYDROCHLORIDE 10 MILLIGRAM(S): 5 TABLET ORAL at 08:45

## 2017-10-25 NOTE — ED CDU PROVIDER DISPOSITION NOTE - PLAN OF CARE
- stay hydrated. heat 20mins on, 40mins off in cycle.  - take tylenol 650mg every 6hrs for pain as needed. take with food. As already prescribed by your Doctor, Take Oxycodone 10mg every 6hrs for severe pain as needed- makes drowsy, can't drive after.  - follow up with your PCP in 1-2 days.  - follow up with your Spine Specialist Dr. Andino in 2-3 days.  - return if symptoms worsen, fever, new/worsening weakness and/or numbness and all other concerns.

## 2017-10-25 NOTE — ED CDU PROVIDER SUBSEQUENT DAY NOTE - ATTENDING CONTRIBUTION TO CARE
Attending MD Martell: I personally have seen and examined this patient.  Resident note reviewed and agree on plan of care and except where noted.  See below for details.     47F s/p C5-6 Discectomy with anterior fusion 2 weeks ago from rehab sent to CDU for RUE weakness and neck pain after TV fell on her head and neck in CDU for MRI C spine and neurochecks.  Patient reports persistent neck pain but improved with pain medications given in CDU.  Denies headache, change in vision, diplopia.  Denies numbness/weakness/tingling in extremities.  Denies chest pain, shortness of breath, palpitations. Denies abdominal pain, nausea, vomiting, diarrhea, blood in stools. Denies loss of urinary or bowel continence. On exam, in C collar, head NCAT, PERRL, no tenderness to palpation or stepoffs along length of spine, +R paraspinal tenderness, lungs CTAB with good inspiratory effort, +S1S2, no m/r/g, abdomen soft with +BS, NT, ND, no CVAT, moving all extremities with 5/5 strength bilateral lower extremities and LUE, RUE 3-4/5; A/P: 47F with recent C spine surgery s/p trauma, MRI completed, stable for discharge. Follow up instructions given, importance of follow up emphasized, return to ED parameters reviewed and patient verbalized understanding.  All questions answered, all concerns addressed.

## 2017-10-25 NOTE — ED ADULT NURSE REASSESSMENT NOTE - NIH STROKE SCALE: 5B. MOTOR ARM, RIGHT, QM
(1) Drift; limb holds 90 (or 45) degrees, but drifts down before full 10 secs; does not hit bed or other support

## 2017-10-25 NOTE — ED ADULT NURSE REASSESSMENT NOTE - NIH STROKE SCALE: 6B. MOTOR LEG, RIGHT, QM
(2) Some effort against gravity; leg falls to bed by 5 secs, but has some effort against gravity

## 2017-10-25 NOTE — ED ADULT NURSE REASSESSMENT NOTE - NS ED NURSE REASSESS COMMENT FT1
1900: report received from rozina, rn. pt resting comfortably in bed. c-collar in place. pending mri. pt denies headache/n/v/numbness/tingling/blurry vision/fever/chills/diaphoresis/incontinence. kothari with 5/5 strength. aox3. safety maintained. pending mri. will continue to monitor.
Pt AO4. Neuro check intact except RUE weakness & diminished sensation. Pt also complains of pain to RUE-medicated as per MAR. No other deficits noted. Safety maintained. Will continue to monitor.
Pt AO4. Neuro check intact except RUE weakness & diminished sensation.  No other deficits noted. Safety maintained. Will continue to monitor.
Pt received from RAFAEL Gaona. Pt oriented to CDU & plan of care was discussed. Pt AO4. Neuro check intact except R sided weakness from previous injury. Pt has decreased strength & sensation in RUE and isn't able to hold arms up for 10 seconds. No deficits other noted. Pt denies headache, dizziness, or neck pain. Safety & comfort measures maintained. Call bell in reach. Will continue to monitor.

## 2017-10-25 NOTE — ED CDU PROVIDER DISPOSITION NOTE - CLINICAL COURSE
46 y/o F s/p recent C-spine surgery (C5, C6) x 2 wks ago c/o neck pain s/p new trauma 2 days ago. Also with complaint of dysphagia x 3 days. Transferred for MRI. pt seen by Ortho-Spine, no surgical intervention, pt to have outpt follow-up. pt sent to CDU for observation, including pain control, neurochecks and MRI. MRI done overnight. no events/neurologic changes overnight. 46 y/o F s/p recent C-spine surgery (C5, C6) x 2 wks ago c/o neck pain s/p new trauma 2 days ago. Also with complaint of dysphagia x 3 days. Transferred for MRI. pt seen by Ortho-Spine, no surgical intervention, pt to have outpt follow-up. pt sent to CDU for observation, including pain control, neurochecks and MRI. MRI negative for acute pathology. Patient stable for discharge with home pain medications and follow up with spine.

## 2017-10-25 NOTE — ED CDU PROVIDER SUBSEQUENT DAY NOTE - PSH
Encounter for insertion of mirena IUD  2017  H/O tubal ligation  2008 [facility]  S/P laparoscopic cholecystectomy

## 2017-10-26 ENCOUNTER — APPOINTMENT (OUTPATIENT)
Dept: ORTHOPEDIC SURGERY | Facility: CLINIC | Age: 47
End: 2017-10-26

## 2017-11-06 ENCOUNTER — APPOINTMENT (OUTPATIENT)
Dept: ORTHOPEDIC SURGERY | Facility: CLINIC | Age: 47
End: 2017-11-06
Payer: MEDICAID

## 2017-11-06 PROCEDURE — 99024 POSTOP FOLLOW-UP VISIT: CPT

## 2017-11-06 PROCEDURE — 72040 X-RAY EXAM NECK SPINE 2-3 VW: CPT

## 2017-11-06 RX ORDER — ACETAMINOPHEN 500 MG/1
500 TABLET, COATED ORAL
Qty: 180 | Refills: 0 | Status: ACTIVE | COMMUNITY
Start: 2017-11-06 | End: 1900-01-01

## 2017-11-06 RX ORDER — GABAPENTIN 300 MG/1
300 CAPSULE ORAL
Qty: 90 | Refills: 2 | Status: ACTIVE | COMMUNITY
Start: 2017-11-06 | End: 1900-01-01

## 2017-11-06 RX ORDER — ONDANSETRON 4 MG/1
4 TABLET ORAL EVERY 6 HOURS
Qty: 120 | Refills: 0 | Status: ACTIVE | COMMUNITY
Start: 2017-11-06 | End: 1900-01-01

## 2017-11-29 ENCOUNTER — APPOINTMENT (OUTPATIENT)
Dept: ORTHOPEDIC SURGERY | Facility: CLINIC | Age: 47
End: 2017-11-29

## 2017-12-04 ENCOUNTER — APPOINTMENT (OUTPATIENT)
Dept: ORTHOPEDIC SURGERY | Facility: CLINIC | Age: 47
End: 2017-12-04
Payer: MEDICAID

## 2017-12-04 PROCEDURE — 99024 POSTOP FOLLOW-UP VISIT: CPT

## 2017-12-04 PROCEDURE — 72040 X-RAY EXAM NECK SPINE 2-3 VW: CPT

## 2017-12-06 ENCOUNTER — APPOINTMENT (OUTPATIENT)
Dept: ORTHOPEDIC SURGERY | Facility: CLINIC | Age: 47
End: 2017-12-06
Payer: MEDICAID

## 2017-12-06 PROCEDURE — 20610 DRAIN/INJ JOINT/BURSA W/O US: CPT | Mod: RT

## 2017-12-06 PROCEDURE — 99213 OFFICE O/P EST LOW 20 MIN: CPT | Mod: 25,24

## 2017-12-06 RX ORDER — AZITHROMYCIN 250 MG/1
250 TABLET, FILM COATED ORAL
Qty: 6 | Refills: 0 | Status: ACTIVE | COMMUNITY
Start: 2017-12-04

## 2017-12-12 RX ORDER — TIZANIDINE HYDROCHLORIDE 4 MG/1
4 CAPSULE ORAL EVERY 6 HOURS
Qty: 120 | Refills: 1 | Status: ACTIVE | COMMUNITY
Start: 2017-11-06 | End: 1900-01-01

## 2018-01-11 ENCOUNTER — OTHER (OUTPATIENT)
Age: 48
End: 2018-01-11

## 2018-01-22 ENCOUNTER — APPOINTMENT (OUTPATIENT)
Dept: ORTHOPEDIC SURGERY | Facility: CLINIC | Age: 48
End: 2018-01-22
Payer: MEDICAID

## 2018-01-22 DIAGNOSIS — M50.20 OTHER CERVICAL DISC DISPLACEMENT, UNSPECIFIED CERVICAL REGION: ICD-10-CM

## 2018-01-22 PROCEDURE — 99214 OFFICE O/P EST MOD 30 MIN: CPT

## 2018-01-22 PROCEDURE — 72040 X-RAY EXAM NECK SPINE 2-3 VW: CPT

## 2018-01-22 RX ORDER — MELOXICAM 15 MG/1
15 TABLET ORAL
Qty: 30 | Refills: 3 | Status: ACTIVE | COMMUNITY
Start: 2018-01-22 | End: 1900-01-01

## 2018-01-24 ENCOUNTER — OTHER (OUTPATIENT)
Age: 48
End: 2018-01-24

## 2018-01-24 ENCOUNTER — APPOINTMENT (OUTPATIENT)
Dept: ORTHOPEDIC SURGERY | Facility: CLINIC | Age: 48
End: 2018-01-24
Payer: MEDICAID

## 2018-01-24 PROCEDURE — 99213 OFFICE O/P EST LOW 20 MIN: CPT

## 2018-01-24 RX ORDER — IBUPROFEN 600 MG/1
600 TABLET, FILM COATED ORAL
Qty: 60 | Refills: 0 | Status: ACTIVE | COMMUNITY
Start: 2018-01-15

## 2018-01-24 RX ORDER — MELOXICAM 7.5 MG/1
7.5 TABLET ORAL
Qty: 14 | Refills: 0 | Status: ACTIVE | COMMUNITY
Start: 2017-10-27

## 2018-01-24 RX ORDER — POLYETHYLENE GLYCOL-3350 AND ELECTROLYTES 236; 6.74; 5.86; 2.97; 22.74 G/274.31G; G/274.31G; G/274.31G; G/274.31G; G/274.31G
236 POWDER, FOR SOLUTION ORAL
Qty: 4000 | Refills: 0 | Status: ACTIVE | COMMUNITY
Start: 2017-08-30

## 2018-01-24 RX ORDER — HYDROCODONE BITARTRATE AND ACETAMINOPHEN 5; 325 MG/1; MG/1
5-325 TABLET ORAL
Qty: 20 | Refills: 0 | Status: ACTIVE | COMMUNITY
Start: 2018-01-15

## 2018-01-24 RX ORDER — POLYETHYLENE GLYCOL 3350 17 G/17G
17 POWDER, FOR SOLUTION ORAL
Qty: 527 | Refills: 0 | Status: ACTIVE | COMMUNITY
Start: 2017-09-20

## 2018-01-24 RX ORDER — OMEPRAZOLE 40 MG/1
40 CAPSULE, DELAYED RELEASE ORAL
Qty: 30 | Refills: 0 | Status: ACTIVE | COMMUNITY
Start: 2017-09-20

## 2018-01-24 RX ORDER — NYSTATIN 100000 1/G
100000 POWDER TOPICAL
Qty: 60 | Refills: 0 | Status: ACTIVE | COMMUNITY
Start: 2017-10-26

## 2018-01-24 RX ORDER — OXYCODONE 10 MG/1
10 TABLET ORAL
Qty: 30 | Refills: 0 | Status: ACTIVE | COMMUNITY
Start: 2017-10-16

## 2018-01-24 RX ORDER — METOCLOPRAMIDE 10 MG/1
10 TABLET ORAL
Qty: 15 | Refills: 0 | Status: ACTIVE | COMMUNITY
Start: 2018-01-14

## 2018-01-24 RX ORDER — AMITRIPTYLINE HYDROCHLORIDE 25 MG/1
25 TABLET, FILM COATED ORAL
Qty: 14 | Refills: 0 | Status: ACTIVE | COMMUNITY
Start: 2017-10-19

## 2018-01-24 RX ORDER — CLOTRIMAZOLE 10 MG/1
10 LOZENGE ORAL
Qty: 70 | Refills: 0 | Status: ACTIVE | COMMUNITY
Start: 2017-10-30

## 2018-01-24 RX ORDER — PANTOPRAZOLE 40 MG/1
40 TABLET, DELAYED RELEASE ORAL
Qty: 14 | Refills: 0 | Status: ACTIVE | COMMUNITY
Start: 2017-10-16

## 2018-01-24 RX ORDER — VALACYCLOVIR 500 MG/1
500 TABLET, FILM COATED ORAL
Qty: 14 | Refills: 0 | Status: ACTIVE | COMMUNITY
Start: 2017-10-30

## 2018-01-24 RX ORDER — LACTULOSE 10 G/15ML
10 SOLUTION ORAL
Qty: 30 | Refills: 0 | Status: ACTIVE | COMMUNITY
Start: 2017-10-19

## 2018-01-24 RX ORDER — DIAZEPAM 5 MG/1
5 TABLET ORAL
Qty: 14 | Refills: 0 | Status: ACTIVE | COMMUNITY
Start: 2017-10-16

## 2018-01-24 RX ORDER — TIZANIDINE 2 MG/1
2 TABLET ORAL
Qty: 28 | Refills: 0 | Status: ACTIVE | COMMUNITY
Start: 2017-10-27

## 2018-01-24 RX ORDER — TIZANIDINE 4 MG/1
4 TABLET ORAL
Qty: 120 | Refills: 0 | Status: ACTIVE | COMMUNITY
Start: 2018-01-06

## 2018-01-24 RX ORDER — BISACODYL 5 MG/1
5 TABLET ORAL
Qty: 4 | Refills: 0 | Status: ACTIVE | COMMUNITY
Start: 2017-08-30

## 2018-02-15 ENCOUNTER — FORM ENCOUNTER (OUTPATIENT)
Age: 48
End: 2018-02-15

## 2018-02-16 ENCOUNTER — OUTPATIENT (OUTPATIENT)
Dept: OUTPATIENT SERVICES | Facility: HOSPITAL | Age: 48
LOS: 1 days | End: 2018-02-16
Payer: MEDICAID

## 2018-02-16 ENCOUNTER — APPOINTMENT (OUTPATIENT)
Dept: MRI IMAGING | Facility: CLINIC | Age: 48
End: 2018-02-16
Payer: MEDICAID

## 2018-02-16 DIAGNOSIS — Z90.49 ACQUIRED ABSENCE OF OTHER SPECIFIED PARTS OF DIGESTIVE TRACT: Chronic | ICD-10-CM

## 2018-02-16 DIAGNOSIS — Z00.8 ENCOUNTER FOR OTHER GENERAL EXAMINATION: ICD-10-CM

## 2018-02-16 DIAGNOSIS — Z98.51 TUBAL LIGATION STATUS: Chronic | ICD-10-CM

## 2018-02-16 DIAGNOSIS — Z30.430 ENCOUNTER FOR INSERTION OF INTRAUTERINE CONTRACEPTIVE DEVICE: Chronic | ICD-10-CM

## 2018-02-16 PROCEDURE — 73221 MRI JOINT UPR EXTREM W/O DYE: CPT

## 2018-02-16 PROCEDURE — 73221 MRI JOINT UPR EXTREM W/O DYE: CPT | Mod: 26,RT

## 2018-02-20 ENCOUNTER — MOBILE ON CALL (OUTPATIENT)
Age: 48
End: 2018-02-20

## 2018-03-14 ENCOUNTER — APPOINTMENT (OUTPATIENT)
Dept: ORTHOPEDIC SURGERY | Facility: CLINIC | Age: 48
End: 2018-03-14
Payer: MEDICAID

## 2018-03-14 PROCEDURE — 99213 OFFICE O/P EST LOW 20 MIN: CPT

## 2018-04-23 ENCOUNTER — APPOINTMENT (OUTPATIENT)
Dept: ORTHOPEDIC SURGERY | Facility: CLINIC | Age: 48
End: 2018-04-23

## 2018-07-23 ENCOUNTER — APPOINTMENT (OUTPATIENT)
Dept: ORTHOPEDIC SURGERY | Facility: CLINIC | Age: 48
End: 2018-07-23
Payer: MEDICAID

## 2018-07-23 PROBLEM — M50.20 OTHER CERVICAL DISC DISPLACEMENT, UNSPECIFIED CERVICAL REGION: Chronic | Status: ACTIVE | Noted: 2017-09-27

## 2018-07-23 PROBLEM — I10 ESSENTIAL (PRIMARY) HYPERTENSION: Chronic | Status: ACTIVE | Noted: 2017-09-27

## 2018-07-23 PROBLEM — G43.909 MIGRAINE, UNSPECIFIED, NOT INTRACTABLE, WITHOUT STATUS MIGRAINOSUS: Chronic | Status: ACTIVE | Noted: 2017-09-27

## 2018-07-23 PROBLEM — M54.12 RADICULOPATHY, CERVICAL REGION: Chronic | Status: ACTIVE | Noted: 2017-09-27

## 2018-07-23 PROBLEM — M75.41 IMPINGEMENT SYNDROME OF RIGHT SHOULDER: Chronic | Status: ACTIVE | Noted: 2017-09-27

## 2018-07-23 PROCEDURE — 99214 OFFICE O/P EST MOD 30 MIN: CPT

## 2018-07-23 PROCEDURE — 72040 X-RAY EXAM NECK SPINE 2-3 VW: CPT

## 2018-08-02 ENCOUNTER — APPOINTMENT (OUTPATIENT)
Dept: ORTHOPEDIC SURGERY | Facility: CLINIC | Age: 48
End: 2018-08-02
Payer: MEDICAID

## 2018-08-02 PROCEDURE — 73564 X-RAY EXAM KNEE 4 OR MORE: CPT | Mod: RT

## 2018-08-02 PROCEDURE — 99214 OFFICE O/P EST MOD 30 MIN: CPT

## 2018-08-02 RX ORDER — IBUPROFEN 600 MG/1
600 TABLET, FILM COATED ORAL 3 TIMES DAILY
Qty: 60 | Refills: 2 | Status: ACTIVE | COMMUNITY
Start: 2018-08-02 | End: 1900-01-01

## 2018-08-18 ENCOUNTER — MOBILE ON CALL (OUTPATIENT)
Age: 48
End: 2018-08-18

## 2018-08-22 ENCOUNTER — APPOINTMENT (OUTPATIENT)
Dept: ORTHOPEDIC SURGERY | Facility: CLINIC | Age: 48
End: 2018-08-22
Payer: MEDICAID

## 2018-08-22 VITALS — HEIGHT: 63 IN | BODY MASS INDEX: 35.44 KG/M2 | WEIGHT: 200 LBS

## 2018-08-22 DIAGNOSIS — S83.282A OTHER TEAR OF LATERAL MENISCUS, CURRENT INJURY, LEFT KNEE, INITIAL ENCOUNTER: ICD-10-CM

## 2018-08-22 DIAGNOSIS — M17.12 UNILATERAL PRIMARY OSTEOARTHRITIS, LEFT KNEE: ICD-10-CM

## 2018-08-22 PROCEDURE — 99214 OFFICE O/P EST MOD 30 MIN: CPT

## 2018-09-06 ENCOUNTER — OUTPATIENT (OUTPATIENT)
Dept: OUTPATIENT SERVICES | Facility: HOSPITAL | Age: 48
LOS: 1 days | Discharge: HOME | End: 2018-09-06
Payer: MEDICAID

## 2018-09-06 VITALS
RESPIRATION RATE: 18 BRPM | HEART RATE: 53 BPM | HEIGHT: 63 IN | DIASTOLIC BLOOD PRESSURE: 73 MMHG | TEMPERATURE: 98 F | WEIGHT: 202.38 LBS | SYSTOLIC BLOOD PRESSURE: 118 MMHG | OXYGEN SATURATION: 100 %

## 2018-09-06 DIAGNOSIS — Z90.49 ACQUIRED ABSENCE OF OTHER SPECIFIED PARTS OF DIGESTIVE TRACT: Chronic | ICD-10-CM

## 2018-09-06 DIAGNOSIS — S83.281A OTHER TEAR OF LATERAL MENISCUS, CURRENT INJURY, RIGHT KNEE, INITIAL ENCOUNTER: ICD-10-CM

## 2018-09-06 DIAGNOSIS — Z01.818 ENCOUNTER FOR OTHER PREPROCEDURAL EXAMINATION: ICD-10-CM

## 2018-09-06 DIAGNOSIS — Z30.430 ENCOUNTER FOR INSERTION OF INTRAUTERINE CONTRACEPTIVE DEVICE: Chronic | ICD-10-CM

## 2018-09-06 DIAGNOSIS — M25.569 PAIN IN UNSPECIFIED KNEE: ICD-10-CM

## 2018-09-06 DIAGNOSIS — I10 ESSENTIAL (PRIMARY) HYPERTENSION: ICD-10-CM

## 2018-09-06 DIAGNOSIS — Z98.51 TUBAL LIGATION STATUS: Chronic | ICD-10-CM

## 2018-09-06 DIAGNOSIS — Z98.890 OTHER SPECIFIED POSTPROCEDURAL STATES: Chronic | ICD-10-CM

## 2018-09-06 LAB
ANION GAP SERPL CALC-SCNC: 7 MMOL/L — SIGNIFICANT CHANGE UP (ref 5–17)
BASOPHILS # BLD AUTO: 0.05 K/UL — SIGNIFICANT CHANGE UP (ref 0–0.2)
BASOPHILS NFR BLD AUTO: 0.7 % — SIGNIFICANT CHANGE UP (ref 0–2)
BUN SERPL-MCNC: 10 MG/DL — SIGNIFICANT CHANGE UP (ref 7–23)
CALCIUM SERPL-MCNC: 9.1 MG/DL — SIGNIFICANT CHANGE UP (ref 8.5–10.1)
CHLORIDE SERPL-SCNC: 106 MMOL/L — SIGNIFICANT CHANGE UP (ref 96–108)
CO2 SERPL-SCNC: 30 MMOL/L — SIGNIFICANT CHANGE UP (ref 22–31)
CREAT SERPL-MCNC: 0.77 MG/DL — SIGNIFICANT CHANGE UP (ref 0.5–1.3)
EOSINOPHIL # BLD AUTO: 0.07 K/UL — SIGNIFICANT CHANGE UP (ref 0–0.5)
EOSINOPHIL NFR BLD AUTO: 1 % — SIGNIFICANT CHANGE UP (ref 0–6)
GLUCOSE SERPL-MCNC: 111 MG/DL — HIGH (ref 70–99)
HCT VFR BLD CALC: 39.9 % — SIGNIFICANT CHANGE UP (ref 34.5–45)
HGB BLD-MCNC: 13.1 G/DL — SIGNIFICANT CHANGE UP (ref 11.5–15.5)
IMM GRANULOCYTES NFR BLD AUTO: 0.3 % — SIGNIFICANT CHANGE UP (ref 0–1.5)
LYMPHOCYTES # BLD AUTO: 2.43 K/UL — SIGNIFICANT CHANGE UP (ref 1–3.3)
LYMPHOCYTES # BLD AUTO: 35.3 % — SIGNIFICANT CHANGE UP (ref 13–44)
MCHC RBC-ENTMCNC: 29.4 PG — SIGNIFICANT CHANGE UP (ref 27–34)
MCHC RBC-ENTMCNC: 32.8 GM/DL — SIGNIFICANT CHANGE UP (ref 32–36)
MCV RBC AUTO: 89.7 FL — SIGNIFICANT CHANGE UP (ref 80–100)
MONOCYTES # BLD AUTO: 0.45 K/UL — SIGNIFICANT CHANGE UP (ref 0–0.9)
MONOCYTES NFR BLD AUTO: 6.5 % — SIGNIFICANT CHANGE UP (ref 2–14)
NEUTROPHILS # BLD AUTO: 3.87 K/UL — SIGNIFICANT CHANGE UP (ref 1.8–7.4)
NEUTROPHILS NFR BLD AUTO: 56.2 % — SIGNIFICANT CHANGE UP (ref 43–77)
PLATELET # BLD AUTO: 307 K/UL — SIGNIFICANT CHANGE UP (ref 150–400)
POTASSIUM SERPL-MCNC: 4 MMOL/L — SIGNIFICANT CHANGE UP (ref 3.5–5.3)
POTASSIUM SERPL-SCNC: 4 MMOL/L — SIGNIFICANT CHANGE UP (ref 3.5–5.3)
RBC # BLD: 4.45 M/UL — SIGNIFICANT CHANGE UP (ref 3.8–5.2)
RBC # FLD: 13.2 % — SIGNIFICANT CHANGE UP (ref 10.3–14.5)
SODIUM SERPL-SCNC: 143 MMOL/L — SIGNIFICANT CHANGE UP (ref 135–145)
WBC # BLD: 6.89 K/UL — SIGNIFICANT CHANGE UP (ref 3.8–10.5)
WBC # FLD AUTO: 6.89 K/UL — SIGNIFICANT CHANGE UP (ref 3.8–10.5)

## 2018-09-06 PROCEDURE — 93010 ELECTROCARDIOGRAM REPORT: CPT | Mod: NC

## 2018-09-06 RX ORDER — AMLODIPINE BESYLATE 2.5 MG/1
0 TABLET ORAL
Qty: 0 | Refills: 0 | COMMUNITY

## 2018-09-06 NOTE — H&P PST ADULT - PSH
Encounter for insertion of mirena IUD  2017  H/O tubal ligation  2008 [facility]  S/P laparoscopic cholecystectomy Encounter for insertion of mirena IUD  2017  H/O tubal ligation  2008 [facility]  S/P cervical discectomy    S/P laparoscopic cholecystectomy

## 2018-09-06 NOTE — H&P PST ADULT - HISTORY OF PRESENT ILLNESS
47 yo  female c/o right knee pain - pt had cervical diskectomy 10/2017 while in rehab 3 days post surgery  a tv fell on her shoulder and she developed pain on right side  of  extremities including right knee which is not responding to conservative management- torn meniscus - scheduled for right knee arthroscopy  spoke in patient's native language

## 2018-09-06 NOTE — ASU PATIENT PROFILE, ADULT - PSH
Encounter for insertion of mirena IUD  2017  H/O tubal ligation  2008 [facility]  S/P laparoscopic cholecystectomy

## 2018-09-06 NOTE — H&P PST ADULT - VISION (WITH CORRECTIVE LENSES IF THE PATIENT USUALLY WEARS THEM):
WEAR GLASSES/Partially impaired: cannot see medication labels or newsprint, but can see obstacles in path, and the surrounding layout; can count fingers at arm's length

## 2018-09-06 NOTE — H&P PST ADULT - ASSESSMENT
right knee torn meniscus right knee torn meniscus  CAPRINI SCORE    AGE RELATED RISK FACTORS                                                       MOBILITY RELATED FACTORS  [ x] Age 41-60 years                                            (1 Point)                  [ ] Bed rest                                                        (1 Point)  [ ] Age: 61-74 years                                           (2 Points)                [ ] Plaster cast                                                   (2 Points)  [ ] Age= 75 years                                              (3 Points)                 [ ] Bed bound for more than 72 hours                   (2 Points)    DISEASE RELATED RISK FACTORS                                               GENDER SPECIFIC FACTORS  [ ] Edema in the lower extremities                       (1 Point)                  [ ] Pregnancy                                                     (1 Point)  [ ] Varicose veins                                               (1 Point)                  [ ] Post-partum < 6 weeks                                   (1 Point)             [x ] BMI > 25 Kg/m2                                            (1 Point)                  [ ] Hormonal therapy  or oral contraception            (1 Point)                 [ ] Sepsis (in the previous month)                        (1 Point)                  [ ] History of pregnancy complications  [ ] Pneumonia or serious lung disease                                               [ ] Unexplained or recurrent                       (1 Point)           (in the previous month)                               (1 Point)  [ ] Abnormal pulmonary function test                     (1 Point)                 SURGERY RELATED RISK FACTORS  [ ] Acute myocardial infarction                              (1 Point)                 [ ]  Section                                            (1 Point)  [ ] Congestive heart failure (in the previous month)  (1 Point)                 [ ] Minor surgery                                                 (1 Point)   [ ] Inflammatory bowel disease                             (1 Point)                 [ c] Arthroscopic surgery                                        (2 Points)  [ ] Central venous access                                    (2 Points)                [ ] General surgery lasting more than 45 minutes   (2 Points)       [ ] Stroke (in the previous month)                          (5 Points)               [ ] Elective arthroplasty                                        (5 Points)                                                                                                                                               HEMATOLOGY RELATED FACTORS                                                 TRAUMA RELATED RISK FACTORS  [ ] Prior episodes of VTE                                     (3 Points)                 [ ] Fracture of the hip, pelvis, or leg                       (5 Points)  [ ] Positive family history for VTE                         (3 Points)                 [ ] Acute spinal cord injury (in the previous month)  (5 Points)  [ ] Prothrombin 71818 A                                      (3 Points)                 [ ] Paralysis  (less than 1 month)                          (5 Points)  [ ] Factor V Leiden                                             (3 Points)                 [ ] Multiple Trauma within 1 month                         (5 Points)  [ ] Lupus anticoagulants                                     (3 Points)                                                           [ ] Anticardiolipin antibodies                                (3 Points)                                                       [ ] High homocysteine in the blood                      (3 Points)                                             [ ] Other congenital or acquired thrombophilia       (3 Points)                                                [ ] Heparin induced thrombocytopenia                  (3 Points)                                          Total Score [       4   ]

## 2018-09-12 ENCOUNTER — TRANSCRIPTION ENCOUNTER (OUTPATIENT)
Age: 48
End: 2018-09-12

## 2018-09-13 ENCOUNTER — APPOINTMENT (OUTPATIENT)
Dept: ORTHOPEDIC SURGERY | Facility: HOSPITAL | Age: 48
End: 2018-09-13

## 2018-09-13 ENCOUNTER — OUTPATIENT (OUTPATIENT)
Dept: OUTPATIENT SERVICES | Facility: HOSPITAL | Age: 48
LOS: 1 days | Discharge: HOME | End: 2018-09-13
Payer: MEDICAID

## 2018-09-13 ENCOUNTER — RESULT REVIEW (OUTPATIENT)
Age: 48
End: 2018-09-13

## 2018-09-13 VITALS
HEART RATE: 80 BPM | DIASTOLIC BLOOD PRESSURE: 79 MMHG | TEMPERATURE: 97 F | RESPIRATION RATE: 19 BRPM | SYSTOLIC BLOOD PRESSURE: 117 MMHG | OXYGEN SATURATION: 100 %

## 2018-09-13 VITALS
OXYGEN SATURATION: 97 % | DIASTOLIC BLOOD PRESSURE: 73 MMHG | RESPIRATION RATE: 16 BRPM | WEIGHT: 190.04 LBS | SYSTOLIC BLOOD PRESSURE: 119 MMHG | HEART RATE: 54 BPM | HEIGHT: 63 IN | TEMPERATURE: 97 F

## 2018-09-13 DIAGNOSIS — Z30.430 ENCOUNTER FOR INSERTION OF INTRAUTERINE CONTRACEPTIVE DEVICE: Chronic | ICD-10-CM

## 2018-09-13 DIAGNOSIS — Z98.890 OTHER SPECIFIED POSTPROCEDURAL STATES: Chronic | ICD-10-CM

## 2018-09-13 DIAGNOSIS — Z98.51 TUBAL LIGATION STATUS: Chronic | ICD-10-CM

## 2018-09-13 DIAGNOSIS — Z90.49 ACQUIRED ABSENCE OF OTHER SPECIFIED PARTS OF DIGESTIVE TRACT: Chronic | ICD-10-CM

## 2018-09-13 PROCEDURE — 29881 ARTHRS KNE SRG MNISECTMY M/L: CPT | Mod: RT

## 2018-09-13 PROCEDURE — 88304 TISSUE EXAM BY PATHOLOGIST: CPT | Mod: 26

## 2018-09-13 RX ORDER — ASPIRIN/CALCIUM CARB/MAGNESIUM 324 MG
1 TABLET ORAL
Qty: 14 | Refills: 0 | OUTPATIENT
Start: 2018-09-13 | End: 2018-09-26

## 2018-09-13 RX ORDER — DOCUSATE SODIUM 100 MG
1 CAPSULE ORAL
Qty: 21 | Refills: 0 | OUTPATIENT
Start: 2018-09-13 | End: 2018-09-19

## 2018-09-13 RX ORDER — ONDANSETRON 8 MG/1
4 TABLET, FILM COATED ORAL ONCE
Qty: 0 | Refills: 0 | Status: DISCONTINUED | OUTPATIENT
Start: 2018-09-13 | End: 2018-09-14

## 2018-09-13 RX ORDER — SODIUM CHLORIDE 9 MG/ML
1000 INJECTION, SOLUTION INTRAVENOUS
Qty: 0 | Refills: 0 | Status: DISCONTINUED | OUTPATIENT
Start: 2018-09-13 | End: 2018-09-14

## 2018-09-13 RX ORDER — FENTANYL CITRATE 50 UG/ML
25 INJECTION INTRAVENOUS
Qty: 0 | Refills: 0 | Status: DISCONTINUED | OUTPATIENT
Start: 2018-09-13 | End: 2018-09-14

## 2018-09-13 RX ORDER — HYDROMORPHONE HYDROCHLORIDE 2 MG/ML
0.5 INJECTION INTRAMUSCULAR; INTRAVENOUS; SUBCUTANEOUS
Qty: 0 | Refills: 0 | Status: DISCONTINUED | OUTPATIENT
Start: 2018-09-13 | End: 2018-09-14

## 2018-09-13 RX ORDER — ACETAMINOPHEN 500 MG
1000 TABLET ORAL ONCE
Qty: 0 | Refills: 0 | Status: COMPLETED | OUTPATIENT
Start: 2018-09-13 | End: 2018-09-13

## 2018-09-13 RX ORDER — ONDANSETRON 8 MG/1
1 TABLET, FILM COATED ORAL
Qty: 9 | Refills: 0 | OUTPATIENT
Start: 2018-09-13 | End: 2018-09-15

## 2018-09-13 RX ADMIN — SODIUM CHLORIDE 100 MILLILITER(S): 9 INJECTION, SOLUTION INTRAVENOUS at 14:36

## 2018-09-13 NOTE — ASU PATIENT PROFILE, ADULT - PSH
Encounter for insertion of mirena IUD  2017  H/O tubal ligation  2008 [facility]  S/P laparoscopic cholecystectomy

## 2018-09-13 NOTE — ASU DISCHARGE PLAN (ADULT/PEDIATRIC). - SPECIAL INSTRUCTIONS
Follow up with Dr Quevedo in 10-14 days. Call office for appointment. Take medications as prescribed, Rx scripts have been electronically sent to your pharmacy, please pick these up when discharged from the Hospital.  Weight Bearing as tolerated Right Lower Extremity.  Keep dressing clean, dry, and intact. Rest, ice, and elevate affected extremity.

## 2018-09-13 NOTE — ASU DISCHARGE PLAN (ADULT/PEDIATRIC). - MEDICATION SUMMARY - MEDICATIONS TO TAKE
I will START or STAY ON the medications listed below when I get home from the hospital:    Ecotrin 325 mg oral delayed release tablet  -- 1 tab(s) by mouth once a day   -- Swallow whole.  Do not crush.  Take with food or milk.    -- Indication: For for blood clot prevention    Norco 5 mg-325 mg oral tablet  -- 1 tab(s) by mouth every 4 to 6 hours -for severe pain MDD:6   -- Caution federal law prohibits the transfer of this drug to any person other  than the person for whom it was prescribed.  May cause drowsiness.  Alcohol may intensify this effect.  Use care when operating dangerous machinery.  This product contains acetaminophen.  Do not use  with any other product containing acetaminophen to prevent possible liver damage.  Using more of this medication than prescribed may cause serious breathing problems.    -- Indication: For for pain    gabapentin 300 mg oral capsule  -- 1 cap(s) by mouth 3 times a day, As Needed  -- Indication: For home med    ondansetron 4 mg oral tablet  -- 1 tab(s) by mouth every 8 hours, As Needed -for nausea   -- Indication: For for nausea    amLODIPine 5 mg oral tablet  -- 1 tab(s) by mouth once a day  -- Indication: For home med    famotidine 20 mg oral tablet  -- 1 tab(s) by mouth every 12 hours, As needed, Dyspepsia  -- Indication: For home med    Colace 100 mg oral capsule  -- 1 cap(s) by mouth 3 times a day -for constipation   -- Medication should be taken with plenty of water.    -- Indication: For for constipation

## 2018-09-13 NOTE — BRIEF OPERATIVE NOTE - PROCEDURE
<<-----Click on this checkbox to enter Procedure Knee arthroscopy, right  09/13/2018    Active  [id number]

## 2018-09-14 LAB — SURGICAL PATHOLOGY STUDY: SIGNIFICANT CHANGE UP

## 2018-09-17 DIAGNOSIS — M25.561 PAIN IN RIGHT KNEE: ICD-10-CM

## 2018-09-17 DIAGNOSIS — Z98.1 ARTHRODESIS STATUS: ICD-10-CM

## 2018-09-17 DIAGNOSIS — I10 ESSENTIAL (PRIMARY) HYPERTENSION: ICD-10-CM

## 2018-09-17 DIAGNOSIS — M17.11 UNILATERAL PRIMARY OSTEOARTHRITIS, RIGHT KNEE: ICD-10-CM

## 2018-09-17 DIAGNOSIS — M23.261 DERANGEMENT OF OTHER LATERAL MENISCUS DUE TO OLD TEAR OR INJURY, RIGHT KNEE: ICD-10-CM

## 2018-09-26 ENCOUNTER — APPOINTMENT (OUTPATIENT)
Dept: ORTHOPEDIC SURGERY | Facility: CLINIC | Age: 48
End: 2018-09-26
Payer: MEDICAID

## 2018-09-26 PROBLEM — G89.29 OTHER CHRONIC PAIN: Chronic | Status: ACTIVE | Noted: 2018-09-06

## 2018-09-26 PROCEDURE — 99024 POSTOP FOLLOW-UP VISIT: CPT

## 2018-09-26 RX ORDER — IBUPROFEN 600 MG/1
600 TABLET, FILM COATED ORAL 3 TIMES DAILY
Qty: 60 | Refills: 2 | Status: ACTIVE | COMMUNITY
Start: 2018-09-26 | End: 1900-01-01

## 2018-10-02 NOTE — H&P PST ADULT - PROBLEM SELECTOR PLAN 1
October 4, 2018      Angie Martin  [address]   [place] [address]    Dear ,      I am happy to inform you that your recent cervical cancer screening test (PAP smear) was normal.      Preventative screenings such as this help to ensure your health for years to come. You should repeat a pap smear in 3 years, unless otherwise directed.      You will still need to return to the clinic every year for your annual exam and other preventive tests.     If you have additional questions regarding this result, please call our registered nurse, Terri at [phone].      Sincerely,      Rosamaria Ybarra MD/  Terri Sanchez, RN, BSN  Pap Tracking            scheduled for right knee arthroscopy

## 2018-10-04 ENCOUNTER — APPOINTMENT (OUTPATIENT)
Dept: NEUROLOGY | Facility: CLINIC | Age: 48
End: 2018-10-04
Payer: MEDICAID

## 2018-10-04 VITALS
WEIGHT: 199 LBS | BODY MASS INDEX: 35.26 KG/M2 | OXYGEN SATURATION: 98 % | SYSTOLIC BLOOD PRESSURE: 110 MMHG | TEMPERATURE: 98.5 F | RESPIRATION RATE: 17 BRPM | HEART RATE: 79 BPM | HEIGHT: 63 IN | DIASTOLIC BLOOD PRESSURE: 68 MMHG

## 2018-10-04 DIAGNOSIS — M25.519 PAIN IN UNSPECIFIED SHOULDER: ICD-10-CM

## 2018-10-04 DIAGNOSIS — G54.0 BRACHIAL PLEXUS DISORDERS: ICD-10-CM

## 2018-10-04 PROCEDURE — 99205 OFFICE O/P NEW HI 60 MIN: CPT

## 2018-10-15 ENCOUNTER — APPOINTMENT (OUTPATIENT)
Dept: ORTHOPEDIC SURGERY | Facility: CLINIC | Age: 48
End: 2018-10-15

## 2018-10-24 ENCOUNTER — APPOINTMENT (OUTPATIENT)
Dept: ORTHOPEDIC SURGERY | Facility: CLINIC | Age: 48
End: 2018-10-24
Payer: MEDICAID

## 2018-10-24 DIAGNOSIS — G89.29 PAIN IN RIGHT KNEE: ICD-10-CM

## 2018-10-24 DIAGNOSIS — S83.8X2D SPRAIN OF OTHER SPECIFIED PARTS OF LEFT KNEE, SUBSEQUENT ENCOUNTER: ICD-10-CM

## 2018-10-24 DIAGNOSIS — M25.561 PAIN IN RIGHT KNEE: ICD-10-CM

## 2018-10-24 PROCEDURE — 99024 POSTOP FOLLOW-UP VISIT: CPT

## 2018-11-07 ENCOUNTER — APPOINTMENT (OUTPATIENT)
Dept: NEUROLOGY | Facility: CLINIC | Age: 48
End: 2018-11-07

## 2018-11-12 ENCOUNTER — APPOINTMENT (OUTPATIENT)
Dept: ORTHOPEDIC SURGERY | Facility: CLINIC | Age: 48
End: 2018-11-12
Payer: MEDICAID

## 2018-11-12 DIAGNOSIS — M54.16 RADICULOPATHY, LUMBAR REGION: ICD-10-CM

## 2018-11-12 PROCEDURE — 99214 OFFICE O/P EST MOD 30 MIN: CPT | Mod: 24

## 2018-11-12 PROCEDURE — 72100 X-RAY EXAM L-S SPINE 2/3 VWS: CPT

## 2018-11-12 PROCEDURE — 72040 X-RAY EXAM NECK SPINE 2-3 VW: CPT

## 2018-11-15 NOTE — H&P PST ADULT - GUM GEN PE MLT EXAM PC
11/15/18 0832   Final Note   Assessment Type Final Discharge Note   Anticipated Discharge Disposition Home-Health      not examined

## 2018-12-03 ENCOUNTER — MOBILE ON CALL (OUTPATIENT)
Age: 48
End: 2018-12-03

## 2018-12-26 ENCOUNTER — APPOINTMENT (OUTPATIENT)
Dept: OTOLARYNGOLOGY | Facility: CLINIC | Age: 48
End: 2018-12-26
Payer: MEDICAID

## 2018-12-26 VITALS
HEIGHT: 63 IN | SYSTOLIC BLOOD PRESSURE: 126 MMHG | HEART RATE: 77 BPM | BODY MASS INDEX: 35.26 KG/M2 | DIASTOLIC BLOOD PRESSURE: 81 MMHG | WEIGHT: 199 LBS

## 2018-12-26 DIAGNOSIS — M54.2 CERVICALGIA: ICD-10-CM

## 2018-12-26 DIAGNOSIS — R49.0 DYSPHONIA: ICD-10-CM

## 2018-12-26 DIAGNOSIS — R05 COUGH: ICD-10-CM

## 2018-12-26 DIAGNOSIS — J34.89 OTHER SPECIFIED DISORDERS OF NOSE AND NASAL SINUSES: ICD-10-CM

## 2018-12-26 DIAGNOSIS — R09.81 NASAL CONGESTION: ICD-10-CM

## 2018-12-26 PROCEDURE — 99204 OFFICE O/P NEW MOD 45 MIN: CPT | Mod: 25

## 2018-12-26 PROCEDURE — 31575 DIAGNOSTIC LARYNGOSCOPY: CPT

## 2018-12-26 RX ORDER — FLUTICASONE PROPIONATE 50 UG/1
50 SPRAY, METERED NASAL TWICE DAILY
Qty: 1 | Refills: 5 | Status: ACTIVE | COMMUNITY
Start: 2018-12-26 | End: 1900-01-01

## 2018-12-26 RX ORDER — OMEPRAZOLE 20 MG/1
20 CAPSULE, DELAYED RELEASE ORAL DAILY
Qty: 30 | Refills: 1 | Status: ACTIVE | COMMUNITY
Start: 2018-12-26 | End: 1900-01-01

## 2019-01-01 ENCOUNTER — FORM ENCOUNTER (OUTPATIENT)
Age: 49
End: 2019-01-01

## 2019-01-02 ENCOUNTER — APPOINTMENT (OUTPATIENT)
Dept: CT IMAGING | Facility: CLINIC | Age: 49
End: 2019-01-02
Payer: MEDICAID

## 2019-01-02 ENCOUNTER — OUTPATIENT (OUTPATIENT)
Dept: OUTPATIENT SERVICES | Facility: HOSPITAL | Age: 49
LOS: 1 days | End: 2019-01-02
Payer: MEDICAID

## 2019-01-02 DIAGNOSIS — Z30.430 ENCOUNTER FOR INSERTION OF INTRAUTERINE CONTRACEPTIVE DEVICE: Chronic | ICD-10-CM

## 2019-01-02 DIAGNOSIS — Z00.8 ENCOUNTER FOR OTHER GENERAL EXAMINATION: ICD-10-CM

## 2019-01-02 DIAGNOSIS — Z98.51 TUBAL LIGATION STATUS: Chronic | ICD-10-CM

## 2019-01-02 DIAGNOSIS — Z98.890 OTHER SPECIFIED POSTPROCEDURAL STATES: Chronic | ICD-10-CM

## 2019-01-02 DIAGNOSIS — Z90.49 ACQUIRED ABSENCE OF OTHER SPECIFIED PARTS OF DIGESTIVE TRACT: Chronic | ICD-10-CM

## 2019-01-02 PROCEDURE — 70491 CT SOFT TISSUE NECK W/DYE: CPT

## 2019-01-02 PROCEDURE — 82565 ASSAY OF CREATININE: CPT

## 2019-01-02 PROCEDURE — 70491 CT SOFT TISSUE NECK W/DYE: CPT | Mod: 26

## 2019-01-04 ENCOUNTER — APPOINTMENT (OUTPATIENT)
Dept: ORTHOPEDIC SURGERY | Facility: CLINIC | Age: 49
End: 2019-01-04
Payer: MEDICAID

## 2019-01-04 VITALS
DIASTOLIC BLOOD PRESSURE: 88 MMHG | HEART RATE: 76 BPM | HEIGHT: 63 IN | SYSTOLIC BLOOD PRESSURE: 123 MMHG | WEIGHT: 199 LBS | BODY MASS INDEX: 35.26 KG/M2

## 2019-01-04 DIAGNOSIS — M48.07 SPINAL STENOSIS, LUMBOSACRAL REGION: ICD-10-CM

## 2019-01-04 PROCEDURE — 99215 OFFICE O/P EST HI 40 MIN: CPT

## 2019-01-04 RX ORDER — TRAMADOL HYDROCHLORIDE 50 MG/1
50 TABLET, COATED ORAL 3 TIMES DAILY
Qty: 90 | Refills: 0 | Status: ACTIVE | COMMUNITY
Start: 2019-01-04 | End: 1900-01-01

## 2019-01-10 ENCOUNTER — APPOINTMENT (OUTPATIENT)
Dept: ENDOCRINOLOGY | Facility: CLINIC | Age: 49
End: 2019-01-10
Payer: MEDICAID

## 2019-01-10 VITALS — DIASTOLIC BLOOD PRESSURE: 80 MMHG | SYSTOLIC BLOOD PRESSURE: 120 MMHG | OXYGEN SATURATION: 98 % | HEIGHT: 63 IN

## 2019-01-10 PROCEDURE — 99205 OFFICE O/P NEW HI 60 MIN: CPT | Mod: 25

## 2019-01-10 PROCEDURE — 76536 US EXAM OF HEAD AND NECK: CPT

## 2019-01-11 NOTE — PROCEDURE
[[employer] e 2008 model, 10-12 MHz frequencies] : multiple real time longitudinal and transverse images were obtained using a high resolution ultrasound with a linear transducer, [employer] e 2008 model, 10-12 MHz frequencies. All measurements will be reported as longitudinal x willis-posterior x transverse. [Thyroid Nodule] : thyroid nodule [Right Thyroid] : right [Upper] : upper pole there is a  [Distinct] : distinct [No] : does not have a halo [Macrocalcifications] : macrocalcifications [Post acoustic shadowing] : posterior acoustic shadowing [2] : 2 [Posterior echogenic enhancement] : posterior echogenic enhancement [Left Thyroid] : left [Mid] : mid pole there is a  [Solid] : solid [Hypoechoic] : hypoechoic nodule [Ovoid] : ovoid in shape [Smooth] : smooth [No calcifications] : no calcifications [Peripheral vascularity] : peripheral vascularity [No abnormal lymph nodes are seen.] : no abnormal lymph nodes are seen [FreeTextEntry1] : 4.75 x 2.51 x 2.34 [FreeTextEntry5] : 4.91 x 1.75 x 2.5 [FreeTextEntry2] : 0.44 [FreeTextEntry3] : 0.93 x 0.5 x 1.00

## 2019-01-11 NOTE — PROCEDURE
[[employer] e 2008 model, 10-12 MHz frequencies] : multiple real time longitudinal and transverse images were obtained using a high resolution ultrasound with a linear transducer, [employer] e 2008 model, 10-12 MHz frequencies. All measurements will be reported as longitudinal x willis-posterior x transverse. [Thyroid Nodule] : thyroid nodule [Right Thyroid] : right [Upper] : upper pole there is a  [Distinct] : distinct [No] : does not have a halo [Macrocalcifications] : macrocalcifications [Post acoustic shadowing] : posterior acoustic shadowing [2] : 2 [Posterior echogenic enhancement] : posterior echogenic enhancement [Left Thyroid] : left [Mid] : mid pole there is a  [Solid] : solid [Hypoechoic] : hypoechoic nodule [Ovoid] : ovoid in shape [Smooth] : smooth [No calcifications] : no calcifications [Peripheral vascularity] : peripheral vascularity [No abnormal lymph nodes are seen.] : no abnormal lymph nodes are seen [FreeTextEntry1] : 4.75 x 2.51 x 2.34 [FreeTextEntry5] : 4.91 x 1.75 x 2.5 [FreeTextEntry2] : 0.44 [FreeTextEntry3] : 0.93 x 0.5 x 1.00

## 2019-01-11 NOTE — IMPRESSION
[FreeTextEntry1] : Presence of 2.28 cm right upper pole nodule, with large linear macrocalcification casting an posterior acoustic shadowing \par  [FreeTextEntry2] : Recommend FNA of the 2.28 cm nodule

## 2019-01-11 NOTE — REVIEW OF SYSTEMS
[Fatigue] : fatigue [Recent Weight Gain (___ Lbs)] : recent [unfilled] ~Ulb weight gain [Dysphagia] : dysphagia [Dysphonia] : dysphonia [Shortness Of Breath] : shortness of breath [Constipation] : constipation [Irregular Menses] : irregular menses [Decreased Appetite] : appetite not decreased [Recent Weight Loss (___ Lbs)] : no recent weight loss [Visual Field Defect] : no visual field defect [Blurry Vision] : no blurred vision [Dry Eyes] : no dryness of the eyes [Eyes Itch] : no itching of the eyes [Chest Pain] : no chest pain [Palpitations] : no palpitations [Wheezing] : no wheezing was heard [Cough] : no cough [Nausea] : no nausea [Vomiting] : no vomiting was observed [Polyuria] : no polyuria [Dysuria] : no dysuria [Joint Pain] : no joint pain [Joint Stiffness] : no joint stiffness [Acanthosis] : no acanthosis  [Hirsutism] : no hirsutism [Headache] : no headaches [Tremors] : no tremors [Dizziness] : no dizziness [Pain/Numbness of Digits] : no pain/numbness of digits [Depression] : no depression [Anxiety] : no anxiety [Polydipsia] : no polydipsia [Galactorrhea] : no galactorrhea  [Easy Bleeding] : no ~M tendency for easy bleeding [Easy Bruising] : no tendency for easy bruising [Swelling] : no swelling

## 2019-01-11 NOTE — ASSESSMENT
[FreeTextEntry1] : 48 year old female with history of lower back pain, HTN here for evaluation of an incidental thyroid nodule found on the right thyroid lobe. She does not have any high risk features in her history. \par In office thyroid ultrasound showed a 2.28 cm hypoechoic nodule with linear macrocalfication. \par As per the size and characteristics of the nodule, it will be warranting biopsy. \par \par -Schedule for biopsy next available \par -Check TFTs before biopsy \par

## 2019-01-11 NOTE — PHYSICAL EXAM
[Alert] : alert [No Acute Distress] : no acute distress [Well Nourished] : well nourished [Normal Sclera/Conjunctiva] : normal sclera/conjunctiva [PERRL] : pupils equal, round and reactive to light [EOMI] : extra ocular movement intact [No Proptosis] : no proptosis [Normal Outer Ear/Nose] : the ears and nose were normal in appearance [Normal Hearing] : hearing was normal [Normal Nasal Mucosa] : the nasal mucosa was normal [No Respiratory Distress] : no respiratory distress [Normal Rate and Effort] : normal respiratory rhythm and effort [No Accessory Muscle Use] : no accessory muscle use [Normal Rate] : heart rate was normal  [Normal S1, S2] : normal S1 and S2 [Regular Rhythm] : with a regular rhythm [Normal Bowel Sounds] : normal bowel sounds [Not Tender] : non-tender [Soft] : abdomen soft [Not Distended] : not distended [Normal] : normal and non tender [No CVA Tenderness] : no ~M costovertebral angle tenderness [No Spinal Tenderness] : no spinal tenderness [Normal Gait] : normal gait [No Joint Swelling] : no joint swelling seen [No Clubbing, Cyanosis] : no clubbing  or cyanosis of the fingernails [Normal Strength/Tone] : muscle strength and tone were normal [No Rash] : no rash [No Skin Lesions] : no skin lesions [Normal Reflexes] : deep tendon reflexes were 2+ and symmetric [No Motor Deficits] : the motor exam was normal [No Tremors] : no tremors [Oriented x3] : oriented to person, place, and time [Normal Insight/Judgement] : insight and judgment were intact [Normal Affect] : the affect was normal [Normal Mood] : the mood was normal [Kyphosis] : no kyphosis present [Scoliosis] : scoliosis not present [de-identified] : + enlarged thyroid

## 2019-01-11 NOTE — PHYSICAL EXAM
[Alert] : alert [No Acute Distress] : no acute distress [Well Nourished] : well nourished [Normal Sclera/Conjunctiva] : normal sclera/conjunctiva [PERRL] : pupils equal, round and reactive to light [EOMI] : extra ocular movement intact [No Proptosis] : no proptosis [Normal Outer Ear/Nose] : the ears and nose were normal in appearance [Normal Hearing] : hearing was normal [Normal Nasal Mucosa] : the nasal mucosa was normal [No Respiratory Distress] : no respiratory distress [Normal Rate and Effort] : normal respiratory rhythm and effort [No Accessory Muscle Use] : no accessory muscle use [Normal Rate] : heart rate was normal  [Normal S1, S2] : normal S1 and S2 [Regular Rhythm] : with a regular rhythm [Normal Bowel Sounds] : normal bowel sounds [Not Tender] : non-tender [Soft] : abdomen soft [Not Distended] : not distended [Normal] : normal and non tender [No CVA Tenderness] : no ~M costovertebral angle tenderness [No Spinal Tenderness] : no spinal tenderness [Normal Gait] : normal gait [No Joint Swelling] : no joint swelling seen [No Clubbing, Cyanosis] : no clubbing  or cyanosis of the fingernails [Normal Strength/Tone] : muscle strength and tone were normal [No Rash] : no rash [No Skin Lesions] : no skin lesions [Normal Reflexes] : deep tendon reflexes were 2+ and symmetric [No Motor Deficits] : the motor exam was normal [No Tremors] : no tremors [Oriented x3] : oriented to person, place, and time [Normal Insight/Judgement] : insight and judgment were intact [Normal Affect] : the affect was normal [Normal Mood] : the mood was normal [Kyphosis] : no kyphosis present [Scoliosis] : scoliosis not present [de-identified] : + enlarged thyroid

## 2019-01-11 NOTE — HISTORY OF PRESENT ILLNESS
[FreeTextEntry1] : 48 year old female with history of lower back pain and hypertension here for evaluation of thyroid nodule \par \par Recently she was having the following symptoms:\par Increased difficulty swallowing \par No changes in voice \par Increased choking sensation \par \par Her PCP subsequently ordered a CT scan of the neck which reported a calcified nodule in her thyroid. No subsequent ultrasound was done. No family history of thyroid cancer and no head or neck radiation exposure \par \par Also reported that following symptoms:\par Gained weight recently \par Increased appetite\par Increased hair loss\par Decreased energy \par Increased heart palpitations\par No period since 2-3 years \par She has an IUD placed \par Occasional constipation \par \par

## 2019-01-14 LAB
T4 FREE SERPL-MCNC: 1.3 NG/DL
TSH SERPL-ACNC: 0.42 UIU/ML

## 2019-01-15 ENCOUNTER — APPOINTMENT (OUTPATIENT)
Dept: NEUROLOGY | Facility: CLINIC | Age: 49
End: 2019-01-15

## 2019-01-30 ENCOUNTER — FORM ENCOUNTER (OUTPATIENT)
Age: 49
End: 2019-01-30

## 2019-01-31 ENCOUNTER — APPOINTMENT (OUTPATIENT)
Dept: CT IMAGING | Facility: CLINIC | Age: 49
End: 2019-01-31
Payer: MEDICAID

## 2019-01-31 ENCOUNTER — OUTPATIENT (OUTPATIENT)
Dept: OUTPATIENT SERVICES | Facility: HOSPITAL | Age: 49
LOS: 1 days | End: 2019-01-31
Payer: MEDICAID

## 2019-01-31 ENCOUNTER — OTHER (OUTPATIENT)
Age: 49
End: 2019-01-31

## 2019-01-31 ENCOUNTER — APPOINTMENT (OUTPATIENT)
Dept: MRI IMAGING | Facility: CLINIC | Age: 49
End: 2019-01-31
Payer: MEDICAID

## 2019-01-31 DIAGNOSIS — Z98.890 OTHER SPECIFIED POSTPROCEDURAL STATES: Chronic | ICD-10-CM

## 2019-01-31 DIAGNOSIS — Z90.49 ACQUIRED ABSENCE OF OTHER SPECIFIED PARTS OF DIGESTIVE TRACT: Chronic | ICD-10-CM

## 2019-01-31 DIAGNOSIS — Q76.2 CONGENITAL SPONDYLOLISTHESIS: ICD-10-CM

## 2019-01-31 DIAGNOSIS — Z98.51 TUBAL LIGATION STATUS: Chronic | ICD-10-CM

## 2019-01-31 DIAGNOSIS — M48.07 SPINAL STENOSIS, LUMBOSACRAL REGION: ICD-10-CM

## 2019-01-31 DIAGNOSIS — Z30.430 ENCOUNTER FOR INSERTION OF INTRAUTERINE CONTRACEPTIVE DEVICE: Chronic | ICD-10-CM

## 2019-01-31 PROCEDURE — 74177 CT ABD & PELVIS W/CONTRAST: CPT | Mod: 26

## 2019-01-31 PROCEDURE — 74177 CT ABD & PELVIS W/CONTRAST: CPT

## 2019-01-31 PROCEDURE — 72148 MRI LUMBAR SPINE W/O DYE: CPT

## 2019-01-31 PROCEDURE — 72148 MRI LUMBAR SPINE W/O DYE: CPT | Mod: 26

## 2019-01-31 PROCEDURE — 82565 ASSAY OF CREATININE: CPT

## 2019-02-07 ENCOUNTER — APPOINTMENT (OUTPATIENT)
Dept: ENDOCRINOLOGY | Facility: CLINIC | Age: 49
End: 2019-02-07
Payer: MEDICAID

## 2019-02-07 VITALS
WEIGHT: 199 LBS | SYSTOLIC BLOOD PRESSURE: 130 MMHG | BODY MASS INDEX: 35.26 KG/M2 | DIASTOLIC BLOOD PRESSURE: 70 MMHG | HEIGHT: 63 IN | OXYGEN SATURATION: 98 % | HEART RATE: 72 BPM

## 2019-02-07 DIAGNOSIS — E04.1 NONTOXIC SINGLE THYROID NODULE: ICD-10-CM

## 2019-02-07 PROCEDURE — 99213 OFFICE O/P EST LOW 20 MIN: CPT | Mod: 25

## 2019-02-07 PROCEDURE — 10005 FNA BX W/US GDN 1ST LES: CPT

## 2019-02-07 NOTE — PHYSICAL EXAM
[Alert] : alert [No Acute Distress] : no acute distress [Well Nourished] : well nourished [Well Developed] : well developed [Normal Sclera/Conjunctiva] : normal sclera/conjunctiva [PERRL] : pupils equal, round and reactive to light [EOMI] : extra ocular movement intact [No Proptosis] : no proptosis [Normal Outer Ear/Nose] : the ears and nose were normal in appearance [Normal TMs] : both tympanic membranes were normal [No Neck Mass] : no neck mass was observed [Supple] : the neck was supple [No Respiratory Distress] : no respiratory distress [Normal Rate and Effort] : normal respiratory rhythm and effort [No Accessory Muscle Use] : no accessory muscle use [Clear to Auscultation] : lungs were clear to auscultation bilaterally [Normal Rate] : heart rate was normal  [Normal S1, S2] : normal S1 and S2 [Regular Rhythm] : with a regular rhythm [Normal Bowel Sounds] : normal bowel sounds [Not Tender] : non-tender [Soft] : abdomen soft [Not Distended] : not distended [Normal] : normal and non tender [No CVA Tenderness] : no ~M costovertebral angle tenderness [Kyphosis] : no kyphosis present [No Spinal Tenderness] : no spinal tenderness [Scoliosis] : scoliosis not present [Normal Gait] : normal gait [No Joint Swelling] : no joint swelling seen [No Clubbing, Cyanosis] : no clubbing  or cyanosis of the fingernails [Normal Strength/Tone] : muscle strength and tone were normal [No Rash] : no rash [Foot Ulcers] : no foot ulcers [No Skin Lesions] : no skin lesions [Acne] : no acne [Normal Reflexes] : deep tendon reflexes were 2+ and symmetric [No Motor Deficits] : the motor exam was normal [No Tremors] : no tremors [Oriented x3] : oriented to person, place, and time [Normal Insight/Judgement] : insight and judgment were intact [Normal Affect] : the affect was normal [Normal Mood] : the mood was normal [de-identified] : No palpable nodules

## 2019-02-07 NOTE — ASSESSMENT
[FreeTextEntry1] : 48 year old female with history of 2.26 cm isthmus nodule now s/p successful biopsy\par \par -Will be called back with results\par -Saved for affirma

## 2019-02-07 NOTE — HISTORY OF PRESENT ILLNESS
[FreeTextEntry1] : 48 year old female with history of lower back pain and hypertension here for follow up of thyroid nodule \par \par Her PCP subsequently ordered a CT scan of the neck which reported a calcified nodule in her thyroid. No subsequent ultrasound was done. No family history of thyroid cancer and no head or neck radiation exposure \par \par \par Indication: thyroid nodule. \par \par Findings: \par The right thyroid lobe measures 4.75 x 2.51 x 2.34 cm. The left thyroid lobe measures 4.91 x 1.75 x 2.5 cm. The isthmus measures 0.44 cm. \par  \par In the right upper pole there is a  solid, hypoechoic nodule. It measures 2.28 x 1.43 x 1.71 cm. The nodule is ovoid in shape and the border is distinct. The nodule does not have a halo. It demonstrates macrocalcifications and posterior acoustic shadowing. It has peripheral vascularity. Doppler grade 2 on power doppler. The cyst has posterior echogenic enhancement. \par \par In the left mid pole there is a  solid, hypoechoic nodule. It measures 0.93 x 0.5 x 1.00 cm. The nodule is ovoid in shape and the border is smooth. The nodule does not have a halo. It demonstrates no calcifications. It has peripheral vascularity. \par \par \par \par \par \par \par Lymph nodes: no abnormal lymph nodes are seen. \par \par  \par Impression\par \par Presence of 2.28 cm right upper pole nodule, with large linear macrocalcification casting an posterior acoustic shadowing \par

## 2019-02-07 NOTE — REVIEW OF SYSTEMS
[Fatigue] : no fatigue [Decreased Appetite] : appetite not decreased [Recent Weight Gain (___ Lbs)] : no recent weight gain [Recent Weight Loss (___ Lbs)] : no recent weight loss [Visual Field Defect] : no visual field defect [Blurry Vision] : no blurred vision [Dry Eyes] : no dryness of the eyes [Eyes Itch] : no itching of the eyes [Dysphagia] : no dysphagia [Dysphonia] : no dysphonia [Chest Pain] : no chest pain [Palpitations] : no palpitations [Heart Rate Is Slow] : the heart rate was not slow [Heart Rate Is Fast] : the heart rate was not fast [Shortness Of Breath] : no shortness of breath [Wheezing] : no wheezing was heard [Cough] : no cough [SOB on Exertion] : no shortness of breath during exertion [Nausea] : no nausea [Vomiting] : no vomiting was observed [Constipation] : no constipation [Diarrhea] : no diarrhea [Polyuria] : no polyuria [Irregular Menses] : regular menses [Joint Pain] : no joint pain [Joint Stiffness] : no joint stiffness [Muscle Weakness] : no muscle weakness [Muscle Cramps] : no muscle cramps [Acanthosis] : no acanthosis  [Hirsutism] : no hirsutism [Acne] : no acne [Hair Loss] : no hair loss [Headache] : no headaches [Tremors] : no tremors [Dizziness] : no dizziness [Pain/Numbness of Digits] : no pain/numbness of digits [Depression] : no depression [Anxiety] : no anxiety [Polydipsia] : no polydipsia [Galactorrhea] : no galactorrhea  [Cold Intolerance] : cold tolerant [Heat Intolerance] : heat tolerant [Easy Bleeding] : no ~M tendency for easy bleeding [Easy Bruising] : no tendency for easy bruising [Swelling] : no swelling

## 2019-02-07 NOTE — PROCEDURE
[Fine Needle Aspiration] : fine needle aspiration ~T ~C was performed [Area of Mass: ______] : mass identified in the [unfilled] [Patient] : the patient [Risks] : risks [Benefits] : benefits [Consent Obtained] : written consent was obtained prior to the procedure and is detailed in the patient's record [Ethyl Chloride] : ethyl chloride [Supine] : the patient was placed in the supine position with the neck extended as tolerated [Alcohol] : alcohol [25 gauge 1.5 inch] : A 25 gauge 1.5 inch needle was used [Ultrasonic Guidance] : ultrasound guidance was employed [____ Passes] : [unfilled] passes were made through the mass [Sent to Histology] : the specimens were prepared in the usual manner and sent to cytopathologist [Tolerated Well] : the patient tolerated the procedure well [Vital Signs Stable] : the vital signs were stable [No Complications] : there were no complications [de-identified] : Saved for [facility]  [FreeTextEntry1] : 48 year old female with history of 2.26 cm isthmus nodule now s/p successful biopsy\par \par -Will be called back with results\par -Saved for affirma\par

## 2019-02-07 NOTE — PROCEDURE
[Fine Needle Aspiration] : fine needle aspiration ~T ~C was performed [Area of Mass: ______] : mass identified in the [unfilled] [Patient] : the patient [Risks] : risks [Benefits] : benefits [Consent Obtained] : written consent was obtained prior to the procedure and is detailed in the patient's record [Ethyl Chloride] : ethyl chloride [Supine] : the patient was placed in the supine position with the neck extended as tolerated [Alcohol] : alcohol [25 gauge 1.5 inch] : A 25 gauge 1.5 inch needle was used [Ultrasonic Guidance] : ultrasound guidance was employed [____ Passes] : [unfilled] passes were made through the mass [Sent to Histology] : the specimens were prepared in the usual manner and sent to cytopathologist [Tolerated Well] : the patient tolerated the procedure well [Vital Signs Stable] : the vital signs were stable [No Complications] : there were no complications [de-identified] : Saved for [facility]  [FreeTextEntry1] : 48 year old female with history of 2.26 cm isthmus nodule now s/p successful biopsy\par \par -Will be called back with results\par -Saved for affirma\par

## 2019-02-07 NOTE — PHYSICAL EXAM
[Alert] : alert [No Acute Distress] : no acute distress [Well Nourished] : well nourished [Well Developed] : well developed [Normal Sclera/Conjunctiva] : normal sclera/conjunctiva [PERRL] : pupils equal, round and reactive to light [EOMI] : extra ocular movement intact [No Proptosis] : no proptosis [Normal Outer Ear/Nose] : the ears and nose were normal in appearance [Normal TMs] : both tympanic membranes were normal [No Neck Mass] : no neck mass was observed [Supple] : the neck was supple [No Respiratory Distress] : no respiratory distress [Normal Rate and Effort] : normal respiratory rhythm and effort [No Accessory Muscle Use] : no accessory muscle use [Clear to Auscultation] : lungs were clear to auscultation bilaterally [Normal Rate] : heart rate was normal  [Normal S1, S2] : normal S1 and S2 [Regular Rhythm] : with a regular rhythm [Normal Bowel Sounds] : normal bowel sounds [Not Tender] : non-tender [Soft] : abdomen soft [Not Distended] : not distended [Normal] : normal and non tender [No CVA Tenderness] : no ~M costovertebral angle tenderness [Kyphosis] : no kyphosis present [No Spinal Tenderness] : no spinal tenderness [Scoliosis] : scoliosis not present [Normal Gait] : normal gait [No Joint Swelling] : no joint swelling seen [No Clubbing, Cyanosis] : no clubbing  or cyanosis of the fingernails [Normal Strength/Tone] : muscle strength and tone were normal [No Rash] : no rash [Foot Ulcers] : no foot ulcers [No Skin Lesions] : no skin lesions [Acne] : no acne [Normal Reflexes] : deep tendon reflexes were 2+ and symmetric [No Motor Deficits] : the motor exam was normal [No Tremors] : no tremors [Oriented x3] : oriented to person, place, and time [Normal Insight/Judgement] : insight and judgment were intact [Normal Affect] : the affect was normal [Normal Mood] : the mood was normal [de-identified] : No palpable nodules

## 2019-02-11 ENCOUNTER — OTHER (OUTPATIENT)
Age: 49
End: 2019-02-11

## 2019-02-13 ENCOUNTER — OUTPATIENT (OUTPATIENT)
Dept: OUTPATIENT SERVICES | Facility: HOSPITAL | Age: 49
LOS: 1 days | End: 2019-02-13
Payer: MEDICAID

## 2019-02-13 VITALS
HEIGHT: 63 IN | HEART RATE: 81 BPM | RESPIRATION RATE: 16 BRPM | OXYGEN SATURATION: 96 % | TEMPERATURE: 98 F | SYSTOLIC BLOOD PRESSURE: 122 MMHG | WEIGHT: 201.5 LBS | DIASTOLIC BLOOD PRESSURE: 83 MMHG

## 2019-02-13 DIAGNOSIS — Z90.721 ACQUIRED ABSENCE OF OVARIES, UNILATERAL: Chronic | ICD-10-CM

## 2019-02-13 DIAGNOSIS — Z01.818 ENCOUNTER FOR OTHER PREPROCEDURAL EXAMINATION: ICD-10-CM

## 2019-02-13 DIAGNOSIS — Z98.1 ARTHRODESIS STATUS: Chronic | ICD-10-CM

## 2019-02-13 DIAGNOSIS — Z98.890 OTHER SPECIFIED POSTPROCEDURAL STATES: Chronic | ICD-10-CM

## 2019-02-13 DIAGNOSIS — M48.07 SPINAL STENOSIS, LUMBOSACRAL REGION: ICD-10-CM

## 2019-02-13 DIAGNOSIS — Z30.430 ENCOUNTER FOR INSERTION OF INTRAUTERINE CONTRACEPTIVE DEVICE: Chronic | ICD-10-CM

## 2019-02-13 DIAGNOSIS — Z29.9 ENCOUNTER FOR PROPHYLACTIC MEASURES, UNSPECIFIED: ICD-10-CM

## 2019-02-13 DIAGNOSIS — Z90.49 ACQUIRED ABSENCE OF OTHER SPECIFIED PARTS OF DIGESTIVE TRACT: Chronic | ICD-10-CM

## 2019-02-13 DIAGNOSIS — Z98.51 TUBAL LIGATION STATUS: Chronic | ICD-10-CM

## 2019-02-13 LAB
ANION GAP SERPL CALC-SCNC: 12 MMOL/L — SIGNIFICANT CHANGE UP (ref 5–17)
BLD GP AB SCN SERPL QL: NEGATIVE — SIGNIFICANT CHANGE UP
BUN SERPL-MCNC: 10 MG/DL — SIGNIFICANT CHANGE UP (ref 7–23)
CALCIUM SERPL-MCNC: 10.1 MG/DL — SIGNIFICANT CHANGE UP (ref 8.4–10.5)
CHLORIDE SERPL-SCNC: 102 MMOL/L — SIGNIFICANT CHANGE UP (ref 96–108)
CO2 SERPL-SCNC: 28 MMOL/L — SIGNIFICANT CHANGE UP (ref 22–31)
CREAT SERPL-MCNC: 1.04 MG/DL — SIGNIFICANT CHANGE UP (ref 0.5–1.3)
GLUCOSE SERPL-MCNC: 205 MG/DL — HIGH (ref 70–99)
HCT VFR BLD CALC: 43 % — SIGNIFICANT CHANGE UP (ref 34.5–45)
HGB BLD-MCNC: 13.8 G/DL — SIGNIFICANT CHANGE UP (ref 11.5–15.5)
MCHC RBC-ENTMCNC: 28.3 PG — SIGNIFICANT CHANGE UP (ref 27–34)
MCHC RBC-ENTMCNC: 32.1 GM/DL — SIGNIFICANT CHANGE UP (ref 32–36)
MCV RBC AUTO: 88.1 FL — SIGNIFICANT CHANGE UP (ref 80–100)
MRSA PCR RESULT.: SIGNIFICANT CHANGE UP
PLATELET # BLD AUTO: 339 K/UL — SIGNIFICANT CHANGE UP (ref 150–400)
POTASSIUM SERPL-MCNC: 4.8 MMOL/L — SIGNIFICANT CHANGE UP (ref 3.5–5.3)
POTASSIUM SERPL-SCNC: 4.8 MMOL/L — SIGNIFICANT CHANGE UP (ref 3.5–5.3)
RBC # BLD: 4.88 M/UL — SIGNIFICANT CHANGE UP (ref 3.8–5.2)
RBC # FLD: 14.1 % — SIGNIFICANT CHANGE UP (ref 10.3–14.5)
RH IG SCN BLD-IMP: POSITIVE — SIGNIFICANT CHANGE UP
S AUREUS DNA NOSE QL NAA+PROBE: SIGNIFICANT CHANGE UP
SODIUM SERPL-SCNC: 142 MMOL/L — SIGNIFICANT CHANGE UP (ref 135–145)
WBC # BLD: 7.51 K/UL — SIGNIFICANT CHANGE UP (ref 3.8–10.5)
WBC # FLD AUTO: 7.51 K/UL — SIGNIFICANT CHANGE UP (ref 3.8–10.5)

## 2019-02-13 PROCEDURE — 86850 RBC ANTIBODY SCREEN: CPT

## 2019-02-13 PROCEDURE — 87641 MR-STAPH DNA AMP PROBE: CPT

## 2019-02-13 PROCEDURE — 86901 BLOOD TYPING SEROLOGIC RH(D): CPT

## 2019-02-13 PROCEDURE — G0463: CPT

## 2019-02-13 PROCEDURE — 87640 STAPH A DNA AMP PROBE: CPT

## 2019-02-13 PROCEDURE — 86900 BLOOD TYPING SEROLOGIC ABO: CPT

## 2019-02-13 PROCEDURE — 80048 BASIC METABOLIC PNL TOTAL CA: CPT

## 2019-02-13 PROCEDURE — 85027 COMPLETE CBC AUTOMATED: CPT

## 2019-02-13 RX ORDER — CEFAZOLIN SODIUM 1 G
2000 VIAL (EA) INJECTION ONCE
Qty: 0 | Refills: 0 | Status: COMPLETED | OUTPATIENT
Start: 2019-02-20 | End: 2019-02-20

## 2019-02-13 NOTE — H&P PST ADULT - NSANTHOSAYNRD_GEN_A_CORE
No. TASIA screening performed.  STOP BANG Legend: 0-2 = LOW Risk; 3-4 = INTERMEDIATE Risk; 5-8 = HIGH Risk neck =15.25 inches/No. TASIA screening performed.  STOP BANG Legend: 0-2 = LOW Risk; 3-4 = INTERMEDIATE Risk; 5-8 = HIGH Risk

## 2019-02-13 NOTE — H&P PST ADULT - ATTENDING COMMENTS
49 yo female with spondylolisthesis L5S1 isthmic, recommend failed PT, NSAIDS, MAGALI, progressive symptoms, recommend , discussed PLIF vs ALIF/PSF. Patient has opted for PLIF L5S1.      I reviewed all major risks, benefits, and complications associated with surgical intervention including but not limited to bleeding, infection, neurological injury, CSF leak, need for further surgical intervention, implant failure, implant migration, pseudarthrosis,  adjacent segment degeneration, pedicle screw breech, hematoma, chronic pain, worsening of pain, risk associated with BMP, off label use of it, retrograde ejaculation (if male patient), anesthetic risk, chronic pain and disability, medical complications (GI, , DVT, PE, cardiac, pulmonary, etc) and risk of mortality.

## 2019-02-13 NOTE — H&P PST ADULT - ASSESSMENT
1.	Spinal Stenosis of Lumbosacral Region  CAPRINI SCORE    AGE RELATED RISK FACTORS                                                       MOBILITY RELATED FACTORS  [x ] Age 41-60 years                                            (1 Point)                  [ ] Bed rest                                                        (1 Point)  [ ] Age: 61-74 years                                           (2 Points)                [ ] Plaster cast                                                   (2 Points)  [ ] Age= 75 years                                              (3 Points)                 [ ] Bed bound for more than 72 hours                   (2 Points)    DISEASE RELATED RISK FACTORS                                               GENDER SPECIFIC FACTORS  [ ] Edema in the lower extremities                       (1 Point)                  [ ] Pregnancy                                                     (1 Point)  [ ] Varicose veins                                               (1 Point)                  [ ] Post-partum < 6 weeks                                   (1 Point)             [x ] BMI > 25 Kg/m2                                            (1 Point)                  [ ] Hormonal therapy  or oral contraception            (1 Point)                 [ ] Sepsis (in the previous month)                        (1 Point)                  [ ] History of pregnancy complications  [ ] Pneumonia or serious lung disease                                               [ ] Unexplained or recurrent                       (1 Point)           (in the previous month)                               (1 Point)  [ ] Abnormal pulmonary function test                     (1 Point)                 SURGERY RELATED RISK FACTORS  [ ] Acute myocardial infarction                              (1 Point)                 [ ]  Section                                            (1 Point)  [ ] Congestive heart failure (in the previous month)  (1 Point)                 [ ] Minor surgery                                                 (1 Point)   [ ] Inflammatory bowel disease                             (1 Point)                 [ ] Arthroscopic surgery                                        (2 Points)  [ ] Central venous access                                    (2 Points)                [ x] General surgery lasting more than 45 minutes   (2 Points)       [ ] Stroke (in the previous month)                          (5 Points)               [ ] Elective arthroplasty                                        (5 Points)                                                                                                                                               HEMATOLOGY RELATED FACTORS                                                 TRAUMA RELATED RISK FACTORS  [ ] Prior episodes of VTE                                     (3 Points)                 [ ] Fracture of the hip, pelvis, or leg                       (5 Points)  [ ] Positive family history for VTE                         (3 Points)                 [ ] Acute spinal cord injury (in the previous month)  (5 Points)  [ ] Prothrombin 38219 A                                      (3 Points)                 [ ] Paralysis  (less than 1 month)                          (5 Points)  [ ] Factor V Leiden                                             (3 Points)                 [ ] Multiple Trauma within 1 month                         (5 Points)  [ ] Lupus anticoagulants                                     (3 Points)                                                           [ ] Anticardiolipin antibodies                                (3 Points)                                                       [ ] High homocysteine in the blood                      (3 Points)                                             [ ] Other congenital or acquired thrombophilia       (3 Points)                                                [ ] Heparin induced thrombocytopenia                  (3 Points)                                          Total Score [       4   ]

## 2019-02-13 NOTE — H&P PST ADULT - PSH
Encounter for insertion of mirena IUD  2017  H/O tubal ligation  2008 [facility]  S/P cervical discectomy    S/P laparoscopic cholecystectomy Encounter for insertion of mirena IUD  2017  H/O tubal ligation  2008 [facility]  History of arthroscopy of right knee  6/2018  S/P cervical spinal fusion  10/2017 C6-C7 ACDF  S/P laparoscopic cholecystectomy Encounter for insertion of mirena IUD  2017  H/O tubal ligation  2008 [facility]  History of arthroscopy of right knee  6/2018  S/P cervical spinal fusion  10/2017 C6-C7 ACDF  S/P laparoscopic cholecystectomy    S/P left oophorectomy  1/2018 - benign, per pt/family

## 2019-02-13 NOTE — H&P PST ADULT - HISTORY OF PRESENT ILLNESS
49 yo female with h/o HTN, thyroid nodule (s/p biopsy 2/2019 - non-toxic), spondylolisthesis s/p C6-C7 ACDF 10/2017. Pt reports increased low back pain, radiating to right leg, which is accompanied by numbness and paresthesias. Pt is scheduled for L5-S1 Anterior Lumbar Interbody Fusion and Posterior Lumbar Laminectomy Spinal Fusion on 2/20/2019.    Pt speaks Welsh and some English, refused free translation services while at [facility], preferring instead that her  Moisés Armenta serve as ad hoc .

## 2019-02-13 NOTE — H&P PST ADULT - PMH
Cervical disc displacement    Cervical radiculopathy    Chronic pain  had steroid injections 1 month ago  Gall stones    Hypertension    Menorrhagia with irregular cycle    Migraines    Shoulder impingement, right Cervical disc displacement    Cervical radiculopathy    Chronic pain  had steroid injections 1 month ago  Gall stones  s/p cholecystectomy  Globus sensation  c/o dysphagia - s/p laryngoscopy 12/2018 Dr Jackson - wn  Hypertension    Menorrhagia with irregular cycle  Mirena IUD placed  Migraines    Shoulder impingement, right    Spinal stenosis of lumbosacral region    Spondylolisthesis    Thyroid nodule  s/p biopsy 2/2019 - nontoxic Cervical disc displacement    Cervical radiculopathy    Chronic pain  had steroid injections 1 month ago  Gall stones  s/p cholecystectomy  Globus sensation  c/o dysphagia - s/p laryngoscopy 12/2018 Dr Jackson - wn  Hypertension    Menorrhagia with irregular cycle  Mirena IUD placed  Migraines    Shoulder impingement, right    Shoulder injury  per pt/family - while in Rehab after 10/2017 C6-C7 ACDF, television fell from mount on wall and struck her in head/right shoulder  Spinal stenosis of lumbosacral region    Spondylolisthesis    Thyroid nodule  s/p biopsy 2/2019 - nontoxic

## 2019-02-13 NOTE — H&P PST ADULT - NEUROLOGICAL SYMPTOMS
paresthesias/headache/right leg; monthly headache/loss of sensation headache/right leg - tingling and loss of sensation; headache monthly on average/loss of sensation/paresthesias

## 2019-02-14 ENCOUNTER — APPOINTMENT (OUTPATIENT)
Dept: ORTHOPEDIC SURGERY | Facility: CLINIC | Age: 49
End: 2019-02-14

## 2019-02-14 PROBLEM — N92.1 EXCESSIVE AND FREQUENT MENSTRUATION WITH IRREGULAR CYCLE: Chronic | Status: ACTIVE | Noted: 2017-09-27

## 2019-02-14 PROBLEM — K80.20 CALCULUS OF GALLBLADDER WITHOUT CHOLECYSTITIS WITHOUT OBSTRUCTION: Chronic | Status: ACTIVE | Noted: 2017-09-27

## 2019-02-14 PROBLEM — S49.90XA UNSPECIFIED INJURY OF SHOULDER AND UPPER ARM, UNSPECIFIED ARM, INITIAL ENCOUNTER: Chronic | Status: ACTIVE | Noted: 2019-02-13

## 2019-02-19 ENCOUNTER — TRANSCRIPTION ENCOUNTER (OUTPATIENT)
Age: 49
End: 2019-02-19

## 2019-02-20 ENCOUNTER — INPATIENT (INPATIENT)
Facility: HOSPITAL | Age: 49
LOS: 3 days | Discharge: HOME | DRG: 460 | End: 2019-02-24
Attending: ORTHOPAEDIC SURGERY | Admitting: ORTHOPAEDIC SURGERY
Payer: MEDICAID

## 2019-02-20 ENCOUNTER — APPOINTMENT (OUTPATIENT)
Dept: ORTHOPEDIC SURGERY | Facility: HOSPITAL | Age: 49
End: 2019-02-20

## 2019-02-20 VITALS
SYSTOLIC BLOOD PRESSURE: 124 MMHG | HEIGHT: 63 IN | WEIGHT: 201.5 LBS | OXYGEN SATURATION: 98 % | DIASTOLIC BLOOD PRESSURE: 85 MMHG | HEART RATE: 75 BPM | RESPIRATION RATE: 16 BRPM | TEMPERATURE: 98 F

## 2019-02-20 DIAGNOSIS — Z90.721 ACQUIRED ABSENCE OF OVARIES, UNILATERAL: Chronic | ICD-10-CM

## 2019-02-20 DIAGNOSIS — Z30.430 ENCOUNTER FOR INSERTION OF INTRAUTERINE CONTRACEPTIVE DEVICE: Chronic | ICD-10-CM

## 2019-02-20 DIAGNOSIS — Z90.49 ACQUIRED ABSENCE OF OTHER SPECIFIED PARTS OF DIGESTIVE TRACT: Chronic | ICD-10-CM

## 2019-02-20 DIAGNOSIS — Z98.1 ARTHRODESIS STATUS: Chronic | ICD-10-CM

## 2019-02-20 DIAGNOSIS — M48.07 SPINAL STENOSIS, LUMBOSACRAL REGION: ICD-10-CM

## 2019-02-20 DIAGNOSIS — Z98.51 TUBAL LIGATION STATUS: Chronic | ICD-10-CM

## 2019-02-20 DIAGNOSIS — Z98.890 OTHER SPECIFIED POSTPROCEDURAL STATES: Chronic | ICD-10-CM

## 2019-02-20 LAB
ANION GAP SERPL CALC-SCNC: 14 MMOL/L — SIGNIFICANT CHANGE UP (ref 5–17)
BUN SERPL-MCNC: 9 MG/DL — SIGNIFICANT CHANGE UP (ref 7–23)
CALCIUM SERPL-MCNC: 8.9 MG/DL — SIGNIFICANT CHANGE UP (ref 8.4–10.5)
CHLORIDE SERPL-SCNC: 106 MMOL/L — SIGNIFICANT CHANGE UP (ref 96–108)
CO2 SERPL-SCNC: 23 MMOL/L — SIGNIFICANT CHANGE UP (ref 22–31)
CREAT SERPL-MCNC: 0.64 MG/DL — SIGNIFICANT CHANGE UP (ref 0.5–1.3)
GLUCOSE SERPL-MCNC: 217 MG/DL — HIGH (ref 70–99)
HCT VFR BLD CALC: 31.9 % — LOW (ref 34.5–45)
HGB BLD-MCNC: 11.5 G/DL — SIGNIFICANT CHANGE UP (ref 11.5–15.5)
MCHC RBC-ENTMCNC: 30 PG — SIGNIFICANT CHANGE UP (ref 27–34)
MCHC RBC-ENTMCNC: 35.9 GM/DL — SIGNIFICANT CHANGE UP (ref 32–36)
MCV RBC AUTO: 83.6 FL — SIGNIFICANT CHANGE UP (ref 80–100)
PLATELET # BLD AUTO: 215 K/UL — SIGNIFICANT CHANGE UP (ref 150–400)
POTASSIUM SERPL-MCNC: 3.5 MMOL/L — SIGNIFICANT CHANGE UP (ref 3.5–5.3)
POTASSIUM SERPL-SCNC: 3.5 MMOL/L — SIGNIFICANT CHANGE UP (ref 3.5–5.3)
RBC # BLD: 3.82 M/UL — SIGNIFICANT CHANGE UP (ref 3.8–5.2)
RBC # FLD: 12.8 % — SIGNIFICANT CHANGE UP (ref 10.3–14.5)
SODIUM SERPL-SCNC: 143 MMOL/L — SIGNIFICANT CHANGE UP (ref 135–145)
WBC # BLD: 8.4 K/UL — SIGNIFICANT CHANGE UP (ref 3.8–10.5)
WBC # FLD AUTO: 8.4 K/UL — SIGNIFICANT CHANGE UP (ref 3.8–10.5)

## 2019-02-20 PROCEDURE — 22840 INSERT SPINE FIXATION DEVICE: CPT

## 2019-02-20 PROCEDURE — 61783 SCAN PROC SPINAL: CPT | Mod: 59

## 2019-02-20 PROCEDURE — 22840 INSERT SPINE FIXATION DEVICE: CPT | Mod: AS

## 2019-02-20 PROCEDURE — 22853 INSJ BIOMECHANICAL DEVICE: CPT

## 2019-02-20 PROCEDURE — 22633 ARTHRD CMBN 1NTRSPC LUMBAR: CPT

## 2019-02-20 PROCEDURE — 22633 ARTHRD CMBN 1NTRSPC LUMBAR: CPT | Mod: AS

## 2019-02-20 PROCEDURE — 63012 REMOVE LAMINA/FACETS LUMBAR: CPT | Mod: 59

## 2019-02-20 PROCEDURE — 22853 INSJ BIOMECHANICAL DEVICE: CPT | Mod: AS

## 2019-02-20 PROCEDURE — 63012 REMOVE LAMINA/FACETS LUMBAR: CPT | Mod: AS,59

## 2019-02-20 RX ORDER — ONDANSETRON 8 MG/1
4 TABLET, FILM COATED ORAL ONCE
Qty: 0 | Refills: 0 | Status: DISCONTINUED | OUTPATIENT
Start: 2019-02-20 | End: 2019-02-20

## 2019-02-20 RX ORDER — ONDANSETRON 8 MG/1
4 TABLET, FILM COATED ORAL EVERY 6 HOURS
Qty: 0 | Refills: 0 | Status: DISCONTINUED | OUTPATIENT
Start: 2019-02-20 | End: 2019-02-22

## 2019-02-20 RX ORDER — AMLODIPINE BESYLATE 2.5 MG/1
5 TABLET ORAL DAILY
Qty: 0 | Refills: 0 | Status: DISCONTINUED | OUTPATIENT
Start: 2019-02-20 | End: 2019-02-24

## 2019-02-20 RX ORDER — SENNA PLUS 8.6 MG/1
2 TABLET ORAL AT BEDTIME
Qty: 0 | Refills: 0 | Status: DISCONTINUED | OUTPATIENT
Start: 2019-02-20 | End: 2019-02-24

## 2019-02-20 RX ORDER — HYDROMORPHONE HYDROCHLORIDE 2 MG/ML
30 INJECTION INTRAMUSCULAR; INTRAVENOUS; SUBCUTANEOUS
Qty: 0 | Refills: 0 | Status: DISCONTINUED | OUTPATIENT
Start: 2019-02-20 | End: 2019-02-22

## 2019-02-20 RX ORDER — HYDROMORPHONE HYDROCHLORIDE 2 MG/ML
0.5 INJECTION INTRAMUSCULAR; INTRAVENOUS; SUBCUTANEOUS
Qty: 0 | Refills: 0 | Status: DISCONTINUED | OUTPATIENT
Start: 2019-02-20 | End: 2019-02-22

## 2019-02-20 RX ORDER — DEXAMETHASONE 0.5 MG/5ML
1 ELIXIR ORAL EVERY 6 HOURS
Qty: 0 | Refills: 0 | Status: COMPLETED | OUTPATIENT
Start: 2019-02-22 | End: 2019-02-23

## 2019-02-20 RX ORDER — DEXAMETHASONE 0.5 MG/5ML
5 ELIXIR ORAL EVERY 6 HOURS
Qty: 0 | Refills: 0 | Status: COMPLETED | OUTPATIENT
Start: 2019-02-20 | End: 2019-02-21

## 2019-02-20 RX ORDER — ACETAMINOPHEN 500 MG
650 TABLET ORAL EVERY 6 HOURS
Qty: 0 | Refills: 0 | Status: DISCONTINUED | OUTPATIENT
Start: 2019-02-20 | End: 2019-02-24

## 2019-02-20 RX ORDER — SODIUM CHLORIDE 9 MG/ML
3 INJECTION INTRAMUSCULAR; INTRAVENOUS; SUBCUTANEOUS EVERY 8 HOURS
Qty: 0 | Refills: 0 | Status: DISCONTINUED | OUTPATIENT
Start: 2019-02-20 | End: 2019-02-20

## 2019-02-20 RX ORDER — SODIUM CHLORIDE 9 MG/ML
1000 INJECTION, SOLUTION INTRAVENOUS
Qty: 0 | Refills: 0 | Status: DISCONTINUED | OUTPATIENT
Start: 2019-02-20 | End: 2019-02-24

## 2019-02-20 RX ORDER — DEXAMETHASONE 0.5 MG/5ML
ELIXIR ORAL
Qty: 0 | Refills: 0 | Status: COMPLETED | OUTPATIENT
Start: 2019-02-21 | End: 2019-02-23

## 2019-02-20 RX ORDER — NALOXONE HYDROCHLORIDE 4 MG/.1ML
0.1 SPRAY NASAL
Qty: 0 | Refills: 0 | Status: DISCONTINUED | OUTPATIENT
Start: 2019-02-20 | End: 2019-02-22

## 2019-02-20 RX ORDER — LIDOCAINE HCL 20 MG/ML
0.2 VIAL (ML) INJECTION ONCE
Qty: 0 | Refills: 0 | Status: DISCONTINUED | OUTPATIENT
Start: 2019-02-20 | End: 2019-02-20

## 2019-02-20 RX ORDER — HYDROMORPHONE HYDROCHLORIDE 2 MG/ML
0.5 INJECTION INTRAMUSCULAR; INTRAVENOUS; SUBCUTANEOUS
Qty: 0 | Refills: 0 | Status: DISCONTINUED | OUTPATIENT
Start: 2019-02-20 | End: 2019-02-20

## 2019-02-20 RX ORDER — DOCUSATE SODIUM 100 MG
100 CAPSULE ORAL THREE TIMES A DAY
Qty: 0 | Refills: 0 | Status: DISCONTINUED | OUTPATIENT
Start: 2019-02-20 | End: 2019-02-24

## 2019-02-20 RX ORDER — GABAPENTIN 400 MG/1
300 CAPSULE ORAL THREE TIMES A DAY
Qty: 0 | Refills: 0 | Status: DISCONTINUED | OUTPATIENT
Start: 2019-02-20 | End: 2019-02-24

## 2019-02-20 RX ORDER — DEXAMETHASONE 0.5 MG/5ML
3 ELIXIR ORAL EVERY 6 HOURS
Qty: 0 | Refills: 0 | Status: COMPLETED | OUTPATIENT
Start: 2019-02-21 | End: 2019-02-22

## 2019-02-20 RX ORDER — CEFAZOLIN SODIUM 1 G
2000 VIAL (EA) INJECTION EVERY 8 HOURS
Qty: 0 | Refills: 0 | Status: COMPLETED | OUTPATIENT
Start: 2019-02-20 | End: 2019-02-20

## 2019-02-20 RX ADMIN — HYDROMORPHONE HYDROCHLORIDE 30 MILLILITER(S): 2 INJECTION INTRAMUSCULAR; INTRAVENOUS; SUBCUTANEOUS at 22:00

## 2019-02-20 RX ADMIN — HYDROMORPHONE HYDROCHLORIDE 30 MILLILITER(S): 2 INJECTION INTRAMUSCULAR; INTRAVENOUS; SUBCUTANEOUS at 20:05

## 2019-02-20 RX ADMIN — HYDROMORPHONE HYDROCHLORIDE 30 MILLILITER(S): 2 INJECTION INTRAMUSCULAR; INTRAVENOUS; SUBCUTANEOUS at 18:58

## 2019-02-20 RX ADMIN — SODIUM CHLORIDE 3 MILLILITER(S): 9 INJECTION INTRAMUSCULAR; INTRAVENOUS; SUBCUTANEOUS at 06:33

## 2019-02-20 RX ADMIN — SENNA PLUS 2 TABLET(S): 8.6 TABLET ORAL at 22:05

## 2019-02-20 RX ADMIN — Medication 5 MILLIGRAM(S): at 14:26

## 2019-02-20 RX ADMIN — Medication 650 MILLIGRAM(S): at 22:35

## 2019-02-20 RX ADMIN — GABAPENTIN 300 MILLIGRAM(S): 400 CAPSULE ORAL at 22:05

## 2019-02-20 RX ADMIN — Medication 100 MILLIGRAM(S): at 23:20

## 2019-02-20 RX ADMIN — Medication 100 MILLIGRAM(S): at 16:35

## 2019-02-20 RX ADMIN — Medication 5 MILLIGRAM(S): at 21:59

## 2019-02-20 RX ADMIN — SODIUM CHLORIDE 100 MILLILITER(S): 9 INJECTION, SOLUTION INTRAVENOUS at 13:51

## 2019-02-20 RX ADMIN — Medication 100 MILLIGRAM(S): at 14:27

## 2019-02-20 RX ADMIN — SODIUM CHLORIDE 100 MILLILITER(S): 9 INJECTION, SOLUTION INTRAVENOUS at 22:20

## 2019-02-20 RX ADMIN — GABAPENTIN 300 MILLIGRAM(S): 400 CAPSULE ORAL at 14:27

## 2019-02-20 RX ADMIN — HYDROMORPHONE HYDROCHLORIDE 30 MILLILITER(S): 2 INJECTION INTRAMUSCULAR; INTRAVENOUS; SUBCUTANEOUS at 17:32

## 2019-02-20 RX ADMIN — HYDROMORPHONE HYDROCHLORIDE 0.5 MILLIGRAM(S): 2 INJECTION INTRAMUSCULAR; INTRAVENOUS; SUBCUTANEOUS at 13:20

## 2019-02-20 RX ADMIN — HYDROMORPHONE HYDROCHLORIDE 30 MILLILITER(S): 2 INJECTION INTRAMUSCULAR; INTRAVENOUS; SUBCUTANEOUS at 14:04

## 2019-02-20 RX ADMIN — Medication 100 MILLIGRAM(S): at 22:05

## 2019-02-20 RX ADMIN — HYDROMORPHONE HYDROCHLORIDE 0.5 MILLIGRAM(S): 2 INJECTION INTRAMUSCULAR; INTRAVENOUS; SUBCUTANEOUS at 13:35

## 2019-02-20 RX ADMIN — Medication 650 MILLIGRAM(S): at 22:05

## 2019-02-20 NOTE — PHYSICAL THERAPY INITIAL EVALUATION ADULT - IMPAIRMENTS CONTRIBUTING IMPAIRED BED MOBILITY, REHAB EVAL
"Chief Complaint   Patient presents with     Tick Bite     found about midnight last night, 5/25/17       Initial /82 (BP Location: Left arm, Patient Position: Chair, Cuff Size: Adult Regular)  Pulse 79  Temp 97.6  F (36.4  C) (Tympanic)  Ht 5' 5.75\" (1.67 m)  Wt 179 lb 6.4 oz (81.4 kg)  SpO2 96%  BMI 29.18 kg/m2 Estimated body mass index is 29.18 kg/(m^2) as calculated from the following:    Height as of this encounter: 5' 5.75\" (1.67 m).    Weight as of this encounter: 179 lb 6.4 oz (81.4 kg).  Medication Reconciliation: complete   Radha GUERRERO CMA (AAMA)    " decreased strength

## 2019-02-20 NOTE — PRE-ANESTHESIA EVALUATION ADULT - NSANTHPEFT_GEN_ALL_CORE
cta bl  rrr cta bl  rrr  weakness R leg > L leg (see neuro exam)  subjective b/l hand/leg numbness/tingling

## 2019-02-20 NOTE — PHYSICAL THERAPY INITIAL EVALUATION ADULT - CRITERIA FOR SKILLED THERAPEUTIC INTERVENTIONS
functional limitations in following categories/risk reduction/prevention/therapy frequency/anticipated discharge recommendation/predicted duration of therapy intervention/anticipated equipment needs at discharge/rehab potential/impairments found

## 2019-02-20 NOTE — BRIEF OPERATIVE NOTE - PROCEDURE
<<-----Click on this checkbox to enter Procedure Lumbar laminectomy  02/20/2019  L5-S1  Active  [id number]  Posterior spinal fusion  02/20/2019  L5-S1  Active  [id number]

## 2019-02-20 NOTE — PHYSICAL THERAPY INITIAL EVALUATION ADULT - DID THE PATIENT HAVE SURGERY?
yes/Spondy L5-S1, Laiminectomy performed with interbody and posterior spinal fusion L5-S1 yes/Spondy L5-S1, Laminectomy performed with interbody and posterior spinal fusion L5-S1

## 2019-02-20 NOTE — PHYSICAL THERAPY INITIAL EVALUATION ADULT - ACTIVE RANGE OF MOTION EXAMINATION, REHAB EVAL
COLLEEN AROM WFLs, GUNNAR REAL WFLs pain with movement/bilateral  lower extremity Active ROM was WFL (within functional limits) OLIVIAE AROM WFLs, GALINDOE AAROM WFLs/bilateral  lower extremity Active ROM was WFL (within functional limits)

## 2019-02-20 NOTE — CHART NOTE - NSCHARTNOTEFT_GEN_A_CORE
Patient seen in RR with  at bedside. Patient Resting with complaints of mild low back pain.  Denies Chest Pain, SOB, N/V. Patient had one episode of vomiting post-operatively after extubation.   Pre op s/sx: low back pain, RLE radiculopathy  ; Post op, patient reports: mild low back pain.   Currently on PCA    T(C): 36.8 (02-20-19 @ 12:45), Max: 36.8 (02-20-19 @ 12:45)  HR: 89 (02-20-19 @ 15:00) (75 - 89)  BP: 113/65 (02-20-19 @ 15:00) (111/65 - 124/85)  RR: 14 (02-20-19 @ 15:00) (14 - 16)  SpO2: 100% (02-20-19 @ 15:00) (93% - 100%)  Wt(kg): --    Exam:   Alert/Oriented, No Acute Distress  Cards: +S1/S2, RRR  Pulm: Poor inspiratory effort, but grossly CTAB           Dressing: Small, quarter sized area of serousanguinous fluid noted to dressing         Drains: : HV x 1 in place; maintaining good suction         Sensation: [x] intact to light touch         Motor exam: [  ]                   [ ] Lower extremity                      PF          DF        EHL       FHL                                                                                            R        4/5        4/5         4/5      4/5                                                        L         5/5        5/5        5/5       5/5                                                                  Calves Soft/Non-tender bilaterally          Toes warm well perfused; capillary refill <3 seconds   Grace in place; draining clear, yellow fluid             LABS:                        11.5   8.4   )-----------( 215      ( 20 Feb 2019 13:23 )             31.9     02-20    143  |  106  |  9   ----------------------------<  217<H>  3.5   |  23  |  0.64    Ca    8.9      20 Feb 2019 13:23          A/P :  Patient is a 48y Female s/p L5-S1 laminectomy with PLIF   -    Pain control- Currently on PCA  -    Taper  -    DVT ppx: SCDs       -    Physical Therapy  -    Weight bearing status: WBAT [x]  -    Supplemental O2 via NC; will begin to wean 4-6 hours post-op  -    1 episode of vomiting during extubation; continue to monitor for aspiration symptoms  -    Remove grace POD #1 when OOB with PT      Jaqueline Buitrago PA-C  Team Pager #[phone]

## 2019-02-20 NOTE — PRE-ANESTHESIA EVALUATION ADULT - NSANTHOSAYNRD_GEN_A_CORE
No. TASIA screening performed.  STOP BANG Legend: 0-2 = LOW Risk; 3-4 = INTERMEDIATE Risk; 5-8 = HIGH Risk/neck =15.25 inches

## 2019-02-20 NOTE — CHART NOTE - NSCHARTNOTEFT_GEN_A_CORE
Patient seen in PACU. Evaluate and measure for custom LSO. Measurements taken without incident. Orthosis to be fir and delivered 2/21/19.  Rosas GAMBOA  [facility]  [phone]

## 2019-02-20 NOTE — PHYSICAL THERAPY INITIAL EVALUATION ADULT - PERTINENT HX OF CURRENT PROBLEM, REHAB EVAL
47 yo female with h/o HTN, thyroid nodule (s/p biopsy 2/2019 - non-toxic), spondylolisthesis s/p C6-C7 ACDF 10/2017. Pt reports increased low back pain, radiating to right leg, which is accompanied by numbness and paresthesias. Pt is scheduled for L5-S1 Anterior Lumbar Interbody Fusion and Posterior Lumbar Laminectomy Spinal Fusion on 2/20/2019.

## 2019-02-20 NOTE — PHYSICAL THERAPY INITIAL EVALUATION ADULT - TRANSFER TRAINING, PT EVAL
Pt will transfer independenly with RW or least restrictive AD in 2 weeks. Pt will transfer independently with RW or least restrictive AD in 2 weeks.

## 2019-02-20 NOTE — PHYSICAL THERAPY INITIAL EVALUATION ADULT - PLANNED THERAPY INTERVENTIONS, PT EVAL
Pt will negotiate 1 flight of stairs indepenedently in 2 weeks./balance training/bed mobility training/gait training/strengthening/transfer training gait training/Pt will negotiate 1 flight of stairs independently in 2 weeks./balance training/strengthening/bed mobility training/transfer training

## 2019-02-20 NOTE — PHYSICAL THERAPY INITIAL EVALUATION ADULT - BED MOBILITY LIMITATIONS, REHAB EVAL
TBD upon funcitonal eval decreased ability to use legs for bridging/pushing/impaired ability to control trunk for mobility/decreased ability to use arms for pushing/pulling

## 2019-02-21 LAB
ANION GAP SERPL CALC-SCNC: 15 MMOL/L — SIGNIFICANT CHANGE UP (ref 5–17)
BUN SERPL-MCNC: 11 MG/DL — SIGNIFICANT CHANGE UP (ref 7–23)
CALCIUM SERPL-MCNC: 9.5 MG/DL — SIGNIFICANT CHANGE UP (ref 8.4–10.5)
CHLORIDE SERPL-SCNC: 102 MMOL/L — SIGNIFICANT CHANGE UP (ref 96–108)
CO2 SERPL-SCNC: 23 MMOL/L — SIGNIFICANT CHANGE UP (ref 22–31)
CREAT SERPL-MCNC: 0.64 MG/DL — SIGNIFICANT CHANGE UP (ref 0.5–1.3)
GLUCOSE SERPL-MCNC: 226 MG/DL — HIGH (ref 70–99)
HCT VFR BLD CALC: 34.9 % — SIGNIFICANT CHANGE UP (ref 34.5–45)
HGB BLD-MCNC: 11.6 G/DL — SIGNIFICANT CHANGE UP (ref 11.5–15.5)
MCHC RBC-ENTMCNC: 28.6 PG — SIGNIFICANT CHANGE UP (ref 27–34)
MCHC RBC-ENTMCNC: 33.2 GM/DL — SIGNIFICANT CHANGE UP (ref 32–36)
MCV RBC AUTO: 86 FL — SIGNIFICANT CHANGE UP (ref 80–100)
PLATELET # BLD AUTO: 288 K/UL — SIGNIFICANT CHANGE UP (ref 150–400)
POTASSIUM SERPL-MCNC: 3.5 MMOL/L — SIGNIFICANT CHANGE UP (ref 3.5–5.3)
POTASSIUM SERPL-SCNC: 3.5 MMOL/L — SIGNIFICANT CHANGE UP (ref 3.5–5.3)
RBC # BLD: 4.06 M/UL — SIGNIFICANT CHANGE UP (ref 3.8–5.2)
RBC # FLD: 13.9 % — SIGNIFICANT CHANGE UP (ref 10.3–14.5)
SODIUM SERPL-SCNC: 140 MMOL/L — SIGNIFICANT CHANGE UP (ref 135–145)
WBC # BLD: 17.38 K/UL — HIGH (ref 3.8–10.5)
WBC # FLD AUTO: 17.38 K/UL — HIGH (ref 3.8–10.5)

## 2019-02-21 RX ADMIN — GABAPENTIN 300 MILLIGRAM(S): 400 CAPSULE ORAL at 06:38

## 2019-02-21 RX ADMIN — Medication 3 MILLIGRAM(S): at 20:06

## 2019-02-21 RX ADMIN — SODIUM CHLORIDE 100 MILLILITER(S): 9 INJECTION, SOLUTION INTRAVENOUS at 06:39

## 2019-02-21 RX ADMIN — HYDROMORPHONE HYDROCHLORIDE 30 MILLILITER(S): 2 INJECTION INTRAMUSCULAR; INTRAVENOUS; SUBCUTANEOUS at 20:03

## 2019-02-21 RX ADMIN — AMLODIPINE BESYLATE 5 MILLIGRAM(S): 2.5 TABLET ORAL at 06:38

## 2019-02-21 RX ADMIN — Medication 100 MILLIGRAM(S): at 22:09

## 2019-02-21 RX ADMIN — Medication 5 MILLIGRAM(S): at 02:41

## 2019-02-21 RX ADMIN — SENNA PLUS 2 TABLET(S): 8.6 TABLET ORAL at 22:09

## 2019-02-21 RX ADMIN — HYDROMORPHONE HYDROCHLORIDE 30 MILLILITER(S): 2 INJECTION INTRAMUSCULAR; INTRAVENOUS; SUBCUTANEOUS at 17:54

## 2019-02-21 RX ADMIN — Medication 5 MILLIGRAM(S): at 12:48

## 2019-02-21 RX ADMIN — HYDROMORPHONE HYDROCHLORIDE 30 MILLILITER(S): 2 INJECTION INTRAMUSCULAR; INTRAVENOUS; SUBCUTANEOUS at 02:42

## 2019-02-21 RX ADMIN — HYDROMORPHONE HYDROCHLORIDE 30 MILLILITER(S): 2 INJECTION INTRAMUSCULAR; INTRAVENOUS; SUBCUTANEOUS at 15:49

## 2019-02-21 RX ADMIN — HYDROMORPHONE HYDROCHLORIDE 30 MILLILITER(S): 2 INJECTION INTRAMUSCULAR; INTRAVENOUS; SUBCUTANEOUS at 11:39

## 2019-02-21 RX ADMIN — Medication 650 MILLIGRAM(S): at 19:00

## 2019-02-21 RX ADMIN — HYDROMORPHONE HYDROCHLORIDE 30 MILLILITER(S): 2 INJECTION INTRAMUSCULAR; INTRAVENOUS; SUBCUTANEOUS at 06:35

## 2019-02-21 RX ADMIN — HYDROMORPHONE HYDROCHLORIDE 30 MILLILITER(S): 2 INJECTION INTRAMUSCULAR; INTRAVENOUS; SUBCUTANEOUS at 07:59

## 2019-02-21 RX ADMIN — GABAPENTIN 300 MILLIGRAM(S): 400 CAPSULE ORAL at 15:46

## 2019-02-21 RX ADMIN — HYDROMORPHONE HYDROCHLORIDE 30 MILLILITER(S): 2 INJECTION INTRAMUSCULAR; INTRAVENOUS; SUBCUTANEOUS at 22:10

## 2019-02-21 RX ADMIN — Medication 100 MILLIGRAM(S): at 06:38

## 2019-02-21 RX ADMIN — GABAPENTIN 300 MILLIGRAM(S): 400 CAPSULE ORAL at 22:09

## 2019-02-21 RX ADMIN — Medication 100 MILLIGRAM(S): at 15:46

## 2019-02-21 RX ADMIN — Medication 650 MILLIGRAM(S): at 17:51

## 2019-02-21 NOTE — CONSULT NOTE ADULT - SUBJECTIVE AND OBJECTIVE BOX
Patient is a 48y old  Female who presents with a chief complaint of "I am having surgery on my lower back." (13 Feb 2019 10:34)      HPI:  47 yo female with h/o HTN, thyroid nodule (s/p biopsy 2/2019 - non-toxic), spondylolisthesis s/p C6-C7 ACDF 10/2017. Pt reports increased low back pain, radiating to right leg, which is accompanied by numbness and paresthesias. Pt is scheduled for L5-S1 Anterior Lumbar Interbody Fusion and Posterior Lumbar Laminectomy Spinal Fusion on 2/20/2019.    Pt speaks Tunisian and some English, refused free translation services while at [facility], preferring instead that her  Moisés Armenta serve as ad hoc . (13 Feb 2019 10:34)      PAST MEDICAL & SURGICAL HISTORY:  Shoulder injury: per pt/family - while in Rehab after 10/2017 C6-C7 ACDF, television fell from mount on wall and struck her in head/right shoulder  Globus sensation: c/o dysphagia - s/p laryngoscopy 12/2018 Dr Jackson - [facility]  Spondylolisthesis  Spinal stenosis of lumbosacral region  Thyroid nodule: s/p biopsy 2/2019 - nontoxic  Chronic pain: had steroid injections 1 month ago  Cervical radiculopathy  Shoulder impingement, right  Menorrhagia with irregular cycle: Mirena IUD placed  Migraines  Gall stones: s/p cholecystectomy  Hypertension  Cervical disc displacement  S/P left oophorectomy: 1/2018 - benign, per pt/family  History of arthroscopy of right knee: 6/2018  S/P cervical spinal fusion: 10/2017 C6-C7 ACDF  Encounter for insertion of mirena IUD: 2017  H/O tubal ligation: 2008 [facility]  S/P laparoscopic cholecystectomy      Review of Systems:     RESPIRATORY: No cough, wheezing, chills or hemoptysis; No shortness of breath  CARDIOVASCULAR: No chest pain, palpitations, dizziness, or leg swelling  GASTROINTESTINAL: No abdominal or epigastric pain. No nausea, vomiting, or hematemesis; No diarrhea or constipation.   NEUROLOGICAL: No headaches,           Allergies    No Known Allergies    Intolerances        Social History:     FAMILY HISTORY:  No pertinent family history in first degree relatives      MEDICATIONS  (STANDING):  amLODIPine   Tablet 5 milliGRAM(s) Oral daily  dexamethasone     Tablet 3 milliGRAM(s) Oral every 6 hours  docusate sodium 100 milliGRAM(s) Oral three times a day  gabapentin 300 milliGRAM(s) Oral three times a day  HYDROmorphone PCA (1 mG/mL) 30 milliLiter(s) PCA Continuous PCA Continuous  lactated ringers. 1000 milliLiter(s) (100 mL/Hr) IV Continuous <Continuous>  senna 2 Tablet(s) Oral at bedtime    MEDICATIONS  (PRN):  acetaminophen   Tablet .. 650 milliGRAM(s) Oral every 6 hours PRN Temp greater or equal to 38C (100.4F), Mild Pain (1 - 3)  HYDROmorphone PCA (1 mG/mL) Rescue Clinician Bolus 0.5 milliGRAM(s) IV Push every 15 minutes PRN for Pain Scale GREATER THAN 6  naloxone Injectable 0.1 milliGRAM(s) IV Push every 3 minutes PRN For ANY of the following changes in patient status:  A. RR LESS THAN 10 breaths per minute, B. Oxygen saturation LESS THAN 90%, C. Sedation score of 6  ondansetron Injectable 4 milliGRAM(s) IV Push every 6 hours PRN Nausea      CAPILLARY BLOOD GLUCOSE        I&O's Summary    20 Feb 2019 07:01  -  21 Feb 2019 07:00  --------------------------------------------------------  IN: 2280 mL / OUT: 4605 mL / NET: -2325 mL    21 Feb 2019 07:01  -  21 Feb 2019 22:36  --------------------------------------------------------  IN: 704 mL / OUT: 3460 mL / NET: -2756 mL        PHYSICAL EXAM:    GENERAL: NAD  NECK: Supple, No JVD  CHEST/LUNG: Clear to auscultation bilaterally; No wheezing.  HEART: Regular rate and rhythm; No murmurs, rubs, or gallops  ABDOMEN: Soft, Nontender, Nondistended; Bowel sounds present  EXTREMITIES:  2+ Peripheral Pulses, No edema  NEUROLOGY: AAOx 3      LABS:                        11.6   17.38 )-----------( 288      ( 21 Feb 2019 12:37 )             34.9     02-21    140  |  102  |  11  ----------------------------<  226<H>  3.5   |  23  |  0.64    Ca    9.5      21 Feb 2019 06:54              CAPILLARY BLOOD GLUCOSE                    RADIOLOGY & ADDITIONAL TESTS:    Imaging Personally Reviewed:    Consultant(s) Notes Reviewed:      Care Discussed with Consultants/Other Providers:    Thanks for consult. Will follow.

## 2019-02-21 NOTE — PROGRESS NOTE ADULT - SUBJECTIVE AND OBJECTIVE BOX
Patient seen and examined.  No acute events overnight.  Pain well controlled.     Vital Signs Last 24 Hrs  T(C): 37 (21 Feb 2019 05:59), Max: 37.2 (20 Feb 2019 22:05)  T(F): 98.6 (21 Feb 2019 05:59), Max: 99 (20 Feb 2019 22:05)  HR: 76 (21 Feb 2019 05:59) (75 - 100)  BP: 113/66 (21 Feb 2019 05:59) (111/65 - 131/71)  BP(mean): 86 (20 Feb 2019 17:00) (79 - 95)  RR: 18 (21 Feb 2019 05:59) (14 - 18)  SpO2: 97% (21 Feb 2019 05:59) (93% - 100%)    02-20 @ 07:01  -  02-21 @ 06:42  --------------------------------------------------------  IN: 1429 mL / OUT: 4255 mL / NET: -2826 mL                            11.5   8.4   )-----------( 215      ( 20 Feb 2019 13:23 )             31.9   02-20    143  |  106  |  9   ----------------------------<  217<H>  3.5   |  23  |  0.64    Ca    8.9      20 Feb 2019 13:23      Exam:  Gen: NAD  LLE:  Aquacel dressing c/d/i  Motor: 5/5 EHL/FHL/TA/Gastrocnemius  Sensory: SILT DP/SP/S/S/T nerve distributions  Vascular: 2+ Dorsalis Pedis pulse        Assessment/Plan:  This is a 48yFemale admitted for L IT fx s/p IMN.     -pain control  -PT  -WBAT  -OOB  -DVT ppx  -dispo plan

## 2019-02-21 NOTE — PROGRESS NOTE ADULT - SUBJECTIVE AND OBJECTIVE BOX
Patient seen and examined.  No acute events overnight.  Pain well controlled.   complains of mild numbness over lateral aspect of right leg.     Vital Signs Last 24 Hrs  T(C): 37 (21 Feb 2019 05:59), Max: 37.2 (20 Feb 2019 22:05)  T(F): 98.6 (21 Feb 2019 05:59), Max: 99 (20 Feb 2019 22:05)  HR: 76 (21 Feb 2019 05:59) (75 - 100)  BP: 113/66 (21 Feb 2019 05:59) (111/65 - 131/71)  BP(mean): 86 (20 Feb 2019 17:00) (79 - 95)  RR: 18 (21 Feb 2019 05:59) (14 - 18)  SpO2: 97% (21 Feb 2019 05:59) (93% - 100%)    02-20 @ 07:01  -  02-21 @ 06:37  --------------------------------------------------------  IN: 1429 mL / OUT: 4255 mL / NET: -2826 mL                            11.5   8.4   )-----------( 215      ( 20 Feb 2019 13:23 )             31.9   02-20    143  |  106  |  9   ----------------------------<  217<H>  3.5   |  23  |  0.64    Ca    8.9      20 Feb 2019 13:23    Spine PE:  Dressing clean dry intact  HV drain serosang out put    Gen: NAD    Spine PE:  Skin intact  No gross deformity  midline TTP C spine  No bony step offs  No paraspinal muscle ttp/hypertonicity   Negative Straight leg raise  Negative clonus  Negative babinski  Negative najera  No saddle anesthesia      Motor:                   C5                C6              C7               C8           T1   R            5/5                5/5            5/5             5/5          5/5  L             5/5               5/5             5/5             5/5          5/5                L2             L3             L4               L5            S1  R         5/5           5/5          5/5             5/5           5/5  L          5/5          5/5           5/5             5/5           5/5    Sensory:            C5         C6         C7      C8       T1        (0=absent, 1=impaired, 2=normal, NT=not testable)  R         2            2           2        2         2  L          2            2           2        2         2               L2          L3         L4      L5       S1         (0=absent, 1=impaired, 2=normal, NT=not testable)  R         2            2            2        2        2  L          2            2           2        2         2        A/P :  Patient is a 48y Female s/p L5-S1 laminectomy with PLIF   -    Pain control- Currently on PCA  -    Taper  -    DVT ppx: SCDs       -    Physical Therapy  -    Weight bearing status: WBAT [x]  -    Supplemental O2 via NC; will begin to wean 4-6 hours post-op  -    1 episode of vomiting during extubation; continue to monitor for aspiration symptoms  -    Remove grace POD #1 when OOB with PT

## 2019-02-21 NOTE — PROGRESS NOTE ADULT - SUBJECTIVE AND OBJECTIVE BOX
Day _1 of Anesthesia Pain Management Service    SUBJECTIVE: Patient is doing well with IV PCA    Pain Scale Score:	[X] Refer to charted pain scores    THERAPY:    [ ] IV PCA Morphine		[ ] 5 mg/mL	[ ] 1 mg/mL  [X] IV PCA Hydromorphone	[ ] 5 mg/mL	[X] 1 mg/mL  [ ] IV PCA Fentanyl		[ ] 50 micrograms/mL    Demand dose: 0.2 mg     Lockout: 6 minutes   Continuous Rate: 0 mg/hr  4 Hour Limit: 4 mg    MEDICATIONS  (STANDING):  amLODIPine   Tablet 5 milliGRAM(s) Oral daily  dexamethasone     Tablet 3 milliGRAM(s) Oral every 6 hours  dexamethasone  Injectable 5 milliGRAM(s) IV Push every 6 hours  docusate sodium 100 milliGRAM(s) Oral three times a day  gabapentin 300 milliGRAM(s) Oral three times a day  HYDROmorphone PCA (1 mG/mL) 30 milliLiter(s) PCA Continuous PCA Continuous  lactated ringers. 1000 milliLiter(s) (100 mL/Hr) IV Continuous <Continuous>  senna 2 Tablet(s) Oral at bedtime    MEDICATIONS  (PRN):  acetaminophen   Tablet .. 650 milliGRAM(s) Oral every 6 hours PRN Temp greater or equal to 38C (100.4F), Mild Pain (1 - 3)  HYDROmorphone PCA (1 mG/mL) Rescue Clinician Bolus 0.5 milliGRAM(s) IV Push every 15 minutes PRN for Pain Scale GREATER THAN 6  naloxone Injectable 0.1 milliGRAM(s) IV Push every 3 minutes PRN For ANY of the following changes in patient status:  A. RR LESS THAN 10 breaths per minute, B. Oxygen saturation LESS THAN 90%, C. Sedation score of 6  ondansetron Injectable 4 milliGRAM(s) IV Push every 6 hours PRN Nausea      OBJECTIVE:    Sedation Score:	[ X] Alert	[ ] Drowsy 	[ ] Arousable	[ ] Asleep	[ ] Unresponsive    Side Effects:	[X ] None	[ ] Nausea	[ ] Vomiting	[ ] Pruritus  		[ ] Other:    Vital Signs Last 24 Hrs  T(C): 37 (21 Feb 2019 05:59), Max: 37.2 (20 Feb 2019 22:05)  T(F): 98.6 (21 Feb 2019 05:59), Max: 99 (20 Feb 2019 22:05)  HR: 76 (21 Feb 2019 05:59) (75 - 100)  BP: 113/66 (21 Feb 2019 05:59) (111/65 - 131/71)  BP(mean): 86 (20 Feb 2019 17:00) (79 - 95)  RR: 18 (21 Feb 2019 05:59) (14 - 18)  SpO2: 97% (21 Feb 2019 05:59) (93% - 100%)    ASSESSMENT/ PLAN    Therapy to  be:               [X] Continued   [ ] Discontinued   [ ] Changed to PRN Analgesics    Documentation and Verification of current medications:   [X] Done	[ ] Not done, not eligible    Comments:

## 2019-02-21 NOTE — CHART NOTE - NSCHARTNOTEFT_GEN_A_CORE
Fit and deliver custom LSO. Left bedside to be worn when out of bed. Reviewed application skin precautions and care. Contact information given. To notify office with any issues questions or concerns.  Rosas GAMBOA  [facility]  [phone]

## 2019-02-21 NOTE — CONSULT NOTE ADULT - ASSESSMENT
Patient is a 48y old  Female who presents with a chief complaint of "I am having surgery on my lower back." (13 Feb 2019 10:34)    S/p  L5-S1 laminectomy with PLIF:  Pain control with Dilaudid PCA  Ortho spine f/up noted.    HTN:  Norvasc

## 2019-02-22 ENCOUNTER — TRANSCRIPTION ENCOUNTER (OUTPATIENT)
Age: 49
End: 2019-02-22

## 2019-02-22 RX ORDER — OXYCODONE HYDROCHLORIDE 5 MG/1
5 TABLET ORAL EVERY 4 HOURS
Qty: 0 | Refills: 0 | Status: DISCONTINUED | OUTPATIENT
Start: 2019-02-22 | End: 2019-02-24

## 2019-02-22 RX ORDER — SENNA PLUS 8.6 MG/1
2 TABLET ORAL
Qty: 0 | Refills: 0 | DISCHARGE
Start: 2019-02-22

## 2019-02-22 RX ORDER — OXYCODONE HYDROCHLORIDE 5 MG/1
10 TABLET ORAL EVERY 4 HOURS
Qty: 0 | Refills: 0 | Status: DISCONTINUED | OUTPATIENT
Start: 2019-02-22 | End: 2019-02-24

## 2019-02-22 RX ORDER — ACETAMINOPHEN 500 MG
2 TABLET ORAL
Qty: 0 | Refills: 0 | DISCHARGE
Start: 2019-02-22

## 2019-02-22 RX ORDER — DOCUSATE SODIUM 100 MG
1 CAPSULE ORAL
Qty: 0 | Refills: 0 | DISCHARGE
Start: 2019-02-22

## 2019-02-22 RX ADMIN — Medication 3 MILLIGRAM(S): at 08:29

## 2019-02-22 RX ADMIN — AMLODIPINE BESYLATE 5 MILLIGRAM(S): 2.5 TABLET ORAL at 06:20

## 2019-02-22 RX ADMIN — HYDROMORPHONE HYDROCHLORIDE 30 MILLILITER(S): 2 INJECTION INTRAMUSCULAR; INTRAVENOUS; SUBCUTANEOUS at 07:54

## 2019-02-22 RX ADMIN — Medication 100 MILLIGRAM(S): at 13:23

## 2019-02-22 RX ADMIN — GABAPENTIN 300 MILLIGRAM(S): 400 CAPSULE ORAL at 23:16

## 2019-02-22 RX ADMIN — OXYCODONE HYDROCHLORIDE 10 MILLIGRAM(S): 5 TABLET ORAL at 12:45

## 2019-02-22 RX ADMIN — Medication 100 MILLIGRAM(S): at 06:20

## 2019-02-22 RX ADMIN — Medication 650 MILLIGRAM(S): at 07:45

## 2019-02-22 RX ADMIN — Medication 650 MILLIGRAM(S): at 06:15

## 2019-02-22 RX ADMIN — OXYCODONE HYDROCHLORIDE 5 MILLIGRAM(S): 5 TABLET ORAL at 21:20

## 2019-02-22 RX ADMIN — Medication 3 MILLIGRAM(S): at 02:01

## 2019-02-22 RX ADMIN — OXYCODONE HYDROCHLORIDE 10 MILLIGRAM(S): 5 TABLET ORAL at 12:24

## 2019-02-22 RX ADMIN — Medication 3 MILLIGRAM(S): at 13:23

## 2019-02-22 RX ADMIN — Medication 100 MILLIGRAM(S): at 23:16

## 2019-02-22 RX ADMIN — HYDROMORPHONE HYDROCHLORIDE 30 MILLILITER(S): 2 INJECTION INTRAMUSCULAR; INTRAVENOUS; SUBCUTANEOUS at 02:02

## 2019-02-22 RX ADMIN — Medication 1 MILLIGRAM(S): at 20:14

## 2019-02-22 RX ADMIN — OXYCODONE HYDROCHLORIDE 5 MILLIGRAM(S): 5 TABLET ORAL at 20:20

## 2019-02-22 RX ADMIN — GABAPENTIN 300 MILLIGRAM(S): 400 CAPSULE ORAL at 13:23

## 2019-02-22 RX ADMIN — Medication 650 MILLIGRAM(S): at 23:19

## 2019-02-22 RX ADMIN — GABAPENTIN 300 MILLIGRAM(S): 400 CAPSULE ORAL at 06:20

## 2019-02-22 RX ADMIN — HYDROMORPHONE HYDROCHLORIDE 30 MILLILITER(S): 2 INJECTION INTRAMUSCULAR; INTRAVENOUS; SUBCUTANEOUS at 06:14

## 2019-02-22 RX ADMIN — SENNA PLUS 2 TABLET(S): 8.6 TABLET ORAL at 23:16

## 2019-02-22 NOTE — DISCHARGE NOTE ADULT - CARE PROVIDER_API CALL
Gabriel Andino)  Orthopedics  [address]  [place] [address]  Phone: [phone]  Fax: [phone]  Follow Up Time:

## 2019-02-22 NOTE — DISCHARGE NOTE ADULT - CARE PLAN
Principal Discharge DX:	Spinal stenosis of lumbosacral region  Goal:	improved ADL's, pain control  Assessment and plan of treatment:	Please follow up with Dr. Andino 7-10 days after your hospital discharge (call for appointment).  PT-weight bearing as tolerated, brace for support, don/doff while sitting for support with ambulation.  Keep incision clean, dry and intact.  Have doctor remove any sutures postop day 14 (if applicable) and apply steristrips.  Please follow up with your PCP within 1 month of hospital discharge for routine checkup. Principal Discharge DX:	Spinal stenosis of lumbosacral region  Goal:	improved ADL's, pain control  Assessment and plan of treatment:	Please follow up with Dr. Andino 7-10 days after your hospital discharge (call for appointment).  PT-weight bearing as tolerated, brace for support, don/doff while sitting for support with ambulation.  Keep incision clean, dry and intact.   Please follow up with your PCP within 1 month of hospital discharge for routine checkup.

## 2019-02-22 NOTE — DISCHARGE NOTE ADULT - MEDICATION SUMMARY - MEDICATIONS TO TAKE
I will START or STAY ON the medications listed below when I get home from the hospital:    LSO Brace  -- Dx: PLIF L5-S1    JESSICA- [employer]  -- Indication: For brace    Rolling Walker  -- Dx: S/p lumbar spine lami/fusion  -- Indication: For walker    dexamethasone 1 mg oral tablet  -- 1 tab(s) by mouth once at 2pm on 2/23 to complete steroid taper.  -- It is very important that you take or use this exactly as directed.  Do not skip doses or discontinue unless directed by your doctor.  Obtain medical advice before taking any non-prescription drugs as some may affect the action of this medication.  Take with food or milk.    -- Indication: For Steroids    oxyCODONE 5 mg oral tablet  -- 1-2 tab(s) by mouth every 4-6 hours, As needed, Moderate to severe pain. MDD:8  -- Indication: For pain    acetaminophen 325 mg oral tablet  -- 2 tab(s) by mouth every 6 hours, As needed, Temp greater or equal to 38C (100.4F), Mild Pain (1 - 3)  -- Indication: For pain/temp    gabapentin 300 mg oral capsule  -- 1 cap(s) by mouth 3 times a day, As Needed  -- Indication: For pain    amLODIPine 5 mg oral tablet  -- 1 tab(s) by mouth once a day  -- Indication: For blood pressure    docusate sodium 100 mg oral capsule  -- 1 cap(s) by mouth 3 times a day  -- Indication: For Stool softener    senna oral tablet  -- 2 tab(s) by mouth once a day (at bedtime)  -- Indication: For laxative I will START or STAY ON the medications listed below when I get home from the hospital:    LSO Brace  -- Dx: PLIF L5-S1    JESSICA- [address]  -- Indication: For Supple    Rolling Walker  -- Dx: S/p lumbar spine lami/fusion  -- Indication: For Supple    dexamethasone 1 mg oral tablet  -- 1 tab(s) by mouth once at 2pm on 2/23 to complete steroid taper.  -- It is very important that you take or use this exactly as directed.  Do not skip doses or discontinue unless directed by your doctor.  Obtain medical advice before taking any non-prescription drugs as some may affect the action of this medication.  Take with food or milk.    -- Indication: For inflammation    oxyCODONE 5 mg oral tablet  -- 1-2 tab(s) by mouth every 4-6 hours, As needed, Moderate to severe pain. MDD:8  -- Indication: For pain mgt    acetaminophen 325 mg oral tablet  -- 2 tab(s) by mouth every 6 hours, As needed, Temp greater or equal to 38C (100.4F), Mild Pain (1 - 3)  -- Indication: For Temps    gabapentin 300 mg oral capsule  -- 1 cap(s) by mouth 3 times a day, As Needed  -- Indication: For pain mgt    amLODIPine 5 mg oral tablet  -- 1 tab(s) by mouth once a day  -- Indication: For HTN    docusate sodium 100 mg oral capsule  -- 1 cap(s) by mouth 3 times a day  -- Indication: For laxative    senna oral tablet  -- 2 tab(s) by mouth once a day (at bedtime)  -- Indication: For laxative

## 2019-02-22 NOTE — PROGRESS NOTE ADULT - SUBJECTIVE AND OBJECTIVE BOX
Patient seen and examined.  No acute events overnight.  Pain well controlled.   Denies cp/sob/f/c    T(C): 36.9 (02-22-19 @ 05:33), Max: 37.1 (02-21-19 @ 23:53)  HR: 71 (02-22-19 @ 05:33) (71 - 90)  BP: 125/77 (02-22-19 @ 05:33) (119/70 - 148/93)  RR: 18 (02-22-19 @ 05:33) (18 - 18)  SpO2: 95% (02-22-19 @ 05:33) (92% - 95%)  Wt(kg): --                          11.6   17.38 )-----------( 288      ( 21 Feb 2019 12:37 )             34.9     02-21    140  |  102  |  11  ----------------------------<  226<H>  3.5   |  23  |  0.64    Ca    9.5      21 Feb 2019 06:54          Spine PE:  Dressing clean dry intact  HV drain serosang output 50/180    Gen: NAD    Motor:                   C5                C6              C7               C8           T1   R            5/5                5/5            5/5             5/5          5/5  L             5/5               5/5             5/5             5/5          5/5                L2             L3             L4               L5            S1  R         5/5           5/5          5/5             5/5           5/5  L          5/5          5/5           5/5             5/5           5/5    Sensory:            C5         C6         C7      C8       T1        (0=absent, 1=impaired, 2=normal, NT=not testable)  R         2            2           2        2         2  L          2            2           2        2         2               L2          L3         L4      L5       S1         (0=absent, 1=impaired, 2=normal, NT=not testable)  R         2            2            2        2        2  L          2            2           2        2         2        A/P :  Patient is a 48y Female s/p L5-S1 laminectomy with PLIF   -    Pain control - Currently on PCA  -    Taper  -    DVT ppx: SCDs       -    Physical Therapy  -    Weight bearing status: WBAT [x]  -    Monitor drain  -    dispo plan

## 2019-02-22 NOTE — DISCHARGE NOTE ADULT - NS AS ACTIVITY OBS
Stairs allowed/Showering allowed/stairs/shower with assistance/Walking-Indoors allowed/No Heavy lifting/straining/Do not make important decisions/Do not drive or operate machinery/Walking-Outdoors allowed

## 2019-02-22 NOTE — PROGRESS NOTE ADULT - SUBJECTIVE AND OBJECTIVE BOX
Patient is a 48y old  Female who presents with a chief complaint of "I am having surgery on my lower back."-L5-S1 PLIF/lami/PSF (22 Feb 2019 12:11)      SUBJECTIVE / OVERNIGHT EVENTS:    Events noted.  No CP/SOB/Dizziness     MEDICATIONS  (STANDING):  amLODIPine   Tablet 5 milliGRAM(s) Oral daily  dexamethasone     Tablet   Oral   dexamethasone     Tablet 1 milliGRAM(s) Oral every 6 hours  docusate sodium 100 milliGRAM(s) Oral three times a day  gabapentin 300 milliGRAM(s) Oral three times a day  lactated ringers. 1000 milliLiter(s) (100 mL/Hr) IV Continuous <Continuous>  senna 2 Tablet(s) Oral at bedtime    MEDICATIONS  (PRN):  acetaminophen   Tablet .. 650 milliGRAM(s) Oral every 6 hours PRN Temp greater or equal to 38C (100.4F), Mild Pain (1 - 3)  oxyCODONE    IR 5 milliGRAM(s) Oral every 4 hours PRN Moderate Pain (4 - 6)  oxyCODONE    IR 10 milliGRAM(s) Oral every 4 hours PRN Severe Pain (7 - 10)        CAPILLARY BLOOD GLUCOSE        I&O's Summary    21 Feb 2019 07:01  -  22 Feb 2019 07:00  --------------------------------------------------------  IN: 1353 mL / OUT: 4895 mL / NET: -3542 mL    22 Feb 2019 07:01  -  22 Feb 2019 17:16  --------------------------------------------------------  IN: 320 mL / OUT: 300 mL / NET: 20 mL        PHYSICAL EXAM:  GENERAL: NAD  NECK: Supple, No JVD  CHEST/LUNG: Clear to auscultation bilaterally; No wheezing.  HEART: Regular rate and rhythm; No murmurs, rubs, or gallops  ABDOMEN: Soft, Nontender, Nondistended; Bowel sounds present  EXTREMITIES:   No clubbing, cyanosis, or edema  NEUROLOGY: AAO       LABS:                        11.6   17.38 )-----------( 288      ( 21 Feb 2019 12:37 )             34.9     02-21    140  |  102  |  11  ----------------------------<  226<H>  3.5   |  23  |  0.64    Ca    9.5      21 Feb 2019 06:54              CAPILLARY BLOOD GLUCOSE                    RADIOLOGY & ADDITIONAL TESTS:    Imaging Personally Reviewed:    Consultant(s) Notes Reviewed:      Care Discussed with Consultants/Other Providers:

## 2019-02-22 NOTE — DISCHARGE NOTE ADULT - HOSPITAL COURSE
This is a 48 year old female admitted to [facility] on 2/20/19 for elective spine surgery.  Procedure was uncomplicated.  Evaluated and treated by PT, recommended for home with home PT.  Remain of stay unremarkable and patient discharged home. This is a 48 year old female admitted to [facility] on 2/20/19 for elective spine surgery.  Procedure was uncomplicated.  Evaluated and treated by PT, recommended for home with outpatient PT.  Remain of stay unremarkable and patient discharged home.

## 2019-02-22 NOTE — DISCHARGE NOTE ADULT - HOME CARE AGENCY
[facility] [phone] SOC day after Discharge, RN and P/T evaluation

## 2019-02-22 NOTE — PROGRESS NOTE ADULT - SUBJECTIVE AND OBJECTIVE BOX
Day __2 of Anesthesia Pain Management Service    SUBJECTIVE: Patient is doing well with IV PCA    Pain Scale Score:	[X] Refer to charted pain scores    THERAPY:    [ ] IV PCA Morphine		[ ] 5 mg/mL	[ ] 1 mg/mL  [X] IV PCA Hydromorphone	[ ] 5 mg/mL	[X] 1 mg/mL  [ ] IV PCA Fentanyl		[ ] 50 micrograms/mL    Demand dose: 0.2 mg     Lockout: 6 minutes   Continuous Rate: 0 mg/hr  4 Hour Limit: 4 mg    MEDICATIONS  (STANDING):  amLODIPine   Tablet 5 milliGRAM(s) Oral daily  dexamethasone     Tablet 3 milliGRAM(s) Oral every 6 hours  dexamethasone     Tablet 1 milliGRAM(s) Oral every 6 hours  docusate sodium 100 milliGRAM(s) Oral three times a day  gabapentin 300 milliGRAM(s) Oral three times a day  HYDROmorphone PCA (1 mG/mL) 30 milliLiter(s) PCA Continuous PCA Continuous  lactated ringers. 1000 milliLiter(s) (100 mL/Hr) IV Continuous <Continuous>  senna 2 Tablet(s) Oral at bedtime    MEDICATIONS  (PRN):  acetaminophen   Tablet .. 650 milliGRAM(s) Oral every 6 hours PRN Temp greater or equal to 38C (100.4F), Mild Pain (1 - 3)  HYDROmorphone PCA (1 mG/mL) Rescue Clinician Bolus 0.5 milliGRAM(s) IV Push every 15 minutes PRN for Pain Scale GREATER THAN 6  naloxone Injectable 0.1 milliGRAM(s) IV Push every 3 minutes PRN For ANY of the following changes in patient status:  A. RR LESS THAN 10 breaths per minute, B. Oxygen saturation LESS THAN 90%, C. Sedation score of 6  ondansetron Injectable 4 milliGRAM(s) IV Push every 6 hours PRN Nausea      OBJECTIVE:    Sedation Score:	[ X] Alert	[ ] Drowsy 	[ ] Arousable	[ ] Asleep	[ ] Unresponsive    Side Effects:	[X ] None	[ ] Nausea	[ ] Vomiting	[ ] Pruritus  		[ ] Other:    Vital Signs Last 24 Hrs  T(C): 36.8 (22 Feb 2019 08:30), Max: 37.1 (21 Feb 2019 23:53)  T(F): 98.2 (22 Feb 2019 08:30), Max: 98.7 (21 Feb 2019 23:53)  HR: 75 (22 Feb 2019 08:30) (71 - 90)  BP: 118/76 (22 Feb 2019 08:30) (118/76 - 148/93)  BP(mean): --  RR: 16 (22 Feb 2019 08:30) (16 - 18)  SpO2: 95% (22 Feb 2019 05:33) (92% - 95%)    ASSESSMENT/ PLAN    Therapy to  be:               [X] Continued   [ ] Discontinued   [ ] Changed to PRN Analgesics    Documentation and Verification of current medications:   [X] Done	[ ] Not done, not eligible    Comments:

## 2019-02-22 NOTE — DISCHARGE NOTE ADULT - PLAN OF CARE
improved ADL's, pain control Please follow up with Dr. Andino 7-10 days after your hospital discharge (call for appointment).  PT-weight bearing as tolerated, brace for support, don/doff while sitting for support with ambulation.  Keep incision clean, dry and intact.  Have doctor remove any sutures postop day 14 (if applicable) and apply steristrips.  Please follow up with your PCP within 1 month of hospital discharge for routine checkup. Please follow up with Dr. Andino 7-10 days after your hospital discharge (call for appointment).  PT-weight bearing as tolerated, brace for support, don/doff while sitting for support with ambulation.  Keep incision clean, dry and intact.   Please follow up with your PCP within 1 month of hospital discharge for routine checkup.

## 2019-02-23 LAB
ANION GAP SERPL CALC-SCNC: 13 MMOL/L — SIGNIFICANT CHANGE UP (ref 5–17)
BUN SERPL-MCNC: 18 MG/DL — SIGNIFICANT CHANGE UP (ref 7–23)
CALCIUM SERPL-MCNC: 9.3 MG/DL — SIGNIFICANT CHANGE UP (ref 8.4–10.5)
CHLORIDE SERPL-SCNC: 99 MMOL/L — SIGNIFICANT CHANGE UP (ref 96–108)
CO2 SERPL-SCNC: 26 MMOL/L — SIGNIFICANT CHANGE UP (ref 22–31)
CREAT SERPL-MCNC: 0.66 MG/DL — SIGNIFICANT CHANGE UP (ref 0.5–1.3)
GLUCOSE SERPL-MCNC: 258 MG/DL — HIGH (ref 70–99)
HCT VFR BLD CALC: 33.9 % — LOW (ref 34.5–45)
HGB BLD-MCNC: 12 G/DL — SIGNIFICANT CHANGE UP (ref 11.5–15.5)
MCHC RBC-ENTMCNC: 29.9 PG — SIGNIFICANT CHANGE UP (ref 27–34)
MCHC RBC-ENTMCNC: 35.5 GM/DL — SIGNIFICANT CHANGE UP (ref 32–36)
MCV RBC AUTO: 84.1 FL — SIGNIFICANT CHANGE UP (ref 80–100)
PLATELET # BLD AUTO: 261 K/UL — SIGNIFICANT CHANGE UP (ref 150–400)
POTASSIUM SERPL-MCNC: 3.8 MMOL/L — SIGNIFICANT CHANGE UP (ref 3.5–5.3)
POTASSIUM SERPL-SCNC: 3.8 MMOL/L — SIGNIFICANT CHANGE UP (ref 3.5–5.3)
RBC # BLD: 4.03 M/UL — SIGNIFICANT CHANGE UP (ref 3.8–5.2)
RBC # FLD: 13.5 % — SIGNIFICANT CHANGE UP (ref 10.3–14.5)
SODIUM SERPL-SCNC: 138 MMOL/L — SIGNIFICANT CHANGE UP (ref 135–145)
WBC # BLD: 14.7 K/UL — HIGH (ref 3.8–10.5)
WBC # FLD AUTO: 14.7 K/UL — HIGH (ref 3.8–10.5)

## 2019-02-23 RX ORDER — POTASSIUM CHLORIDE 20 MEQ
20 PACKET (EA) ORAL
Qty: 0 | Refills: 0 | Status: COMPLETED | OUTPATIENT
Start: 2019-02-23 | End: 2019-02-23

## 2019-02-23 RX ORDER — ACETAMINOPHEN 500 MG
1000 TABLET ORAL ONCE
Qty: 0 | Refills: 0 | Status: COMPLETED | OUTPATIENT
Start: 2019-02-23 | End: 2019-02-23

## 2019-02-23 RX ORDER — DEXAMETHASONE 0.5 MG/5ML
1 ELIXIR ORAL
Qty: 1 | Refills: 0
Start: 2019-02-23

## 2019-02-23 RX ORDER — OXYCODONE HYDROCHLORIDE 5 MG/1
1 TABLET ORAL
Qty: 60 | Refills: 0
Start: 2019-02-23

## 2019-02-23 RX ADMIN — Medication 1 MILLIGRAM(S): at 02:20

## 2019-02-23 RX ADMIN — Medication 1000 MILLIGRAM(S): at 14:19

## 2019-02-23 RX ADMIN — Medication 400 MILLIGRAM(S): at 13:30

## 2019-02-23 RX ADMIN — AMLODIPINE BESYLATE 5 MILLIGRAM(S): 2.5 TABLET ORAL at 05:20

## 2019-02-23 RX ADMIN — Medication 100 MILLIGRAM(S): at 13:35

## 2019-02-23 RX ADMIN — OXYCODONE HYDROCHLORIDE 10 MILLIGRAM(S): 5 TABLET ORAL at 23:00

## 2019-02-23 RX ADMIN — Medication 1 MILLIGRAM(S): at 11:25

## 2019-02-23 RX ADMIN — OXYCODONE HYDROCHLORIDE 10 MILLIGRAM(S): 5 TABLET ORAL at 22:10

## 2019-02-23 RX ADMIN — Medication 20 MILLIEQUIVALENT(S): at 11:24

## 2019-02-23 RX ADMIN — Medication 20 MILLIEQUIVALENT(S): at 12:27

## 2019-02-23 RX ADMIN — Medication 650 MILLIGRAM(S): at 00:30

## 2019-02-23 RX ADMIN — Medication 100 MILLIGRAM(S): at 22:10

## 2019-02-23 RX ADMIN — SENNA PLUS 2 TABLET(S): 8.6 TABLET ORAL at 22:10

## 2019-02-23 RX ADMIN — GABAPENTIN 300 MILLIGRAM(S): 400 CAPSULE ORAL at 22:10

## 2019-02-23 RX ADMIN — GABAPENTIN 300 MILLIGRAM(S): 400 CAPSULE ORAL at 13:37

## 2019-02-23 RX ADMIN — Medication 1 MILLIGRAM(S): at 13:40

## 2019-02-23 RX ADMIN — GABAPENTIN 300 MILLIGRAM(S): 400 CAPSULE ORAL at 05:20

## 2019-02-23 RX ADMIN — Medication 100 MILLIGRAM(S): at 05:20

## 2019-02-23 NOTE — PROGRESS NOTE ADULT - ASSESSMENT
A/p: 48yFemale s/p  L5-s1 lami/PSIF/PSF.  Improved symptoms, and patient is recovering well from her procedure.  Patient is cleared for discharge home today.  VSS. NAD.

## 2019-02-23 NOTE — PROVIDER CONTACT NOTE (OTHER) - ACTION/TREATMENT ORDERED:
IV Tylenol given, Pt. given 24 Hour Notice, Pt. cried, called , stated she was in too much pain, and will be going home tomorrow/Ortho Resident made  aware/PP/RN

## 2019-02-23 NOTE — PROGRESS NOTE ADULT - SUBJECTIVE AND OBJECTIVE BOX
Patient is a 48y old  Female who presents with a chief complaint of L5-s1 lami/PSIF/PSF (23 Feb 2019 07:16)      SUBJECTIVE / OVERNIGHT EVENTS:    Events noted.  No CP/SOB/Dizziness  Rt leg tightness - chronic     MEDICATIONS  (STANDING):  amLODIPine   Tablet 5 milliGRAM(s) Oral daily  docusate sodium 100 milliGRAM(s) Oral three times a day  gabapentin 300 milliGRAM(s) Oral three times a day  lactated ringers. 1000 milliLiter(s) (100 mL/Hr) IV Continuous <Continuous>  senna 2 Tablet(s) Oral at bedtime    MEDICATIONS  (PRN):  acetaminophen   Tablet .. 650 milliGRAM(s) Oral every 6 hours PRN Temp greater or equal to 38C (100.4F), Mild Pain (1 - 3)  oxyCODONE    IR 5 milliGRAM(s) Oral every 4 hours PRN Moderate Pain (4 - 6)  oxyCODONE    IR 10 milliGRAM(s) Oral every 4 hours PRN Severe Pain (7 - 10)        CAPILLARY BLOOD GLUCOSE        I&O's Summary    22 Feb 2019 07:01  -  23 Feb 2019 07:00  --------------------------------------------------------  IN: 1320 mL / OUT: 380 mL / NET: 940 mL    23 Feb 2019 07:01  -  23 Feb 2019 19:10  --------------------------------------------------------  IN: 800 mL / OUT: 300 mL / NET: 500 mL        PHYSICAL EXAM:  GENERAL: NAD  NECK: Supple, No JVD  CHEST/LUNG: Clear to auscultation bilaterally; No wheezing.  HEART: Regular rate and rhythm; No murmurs, rubs, or gallops  ABDOMEN: Soft, Nontender, Nondistended; Bowel sounds present  EXTREMITIES:   No clubbing, cyanosis, or edema  NEUROLOGY: AAO X 3      LABS:                        12.0   14.7  )-----------( 261      ( 23 Feb 2019 07:42 )             33.9     02-23    138  |  99  |  18  ----------------------------<  258<H>  3.8   |  26  |  0.66    Ca    9.3      23 Feb 2019 07:42              CAPILLARY BLOOD GLUCOSE                    RADIOLOGY & ADDITIONAL TESTS:    Imaging Personally Reviewed:    Consultant(s) Notes Reviewed:      Care Discussed with Consultants/Other Providers:

## 2019-02-23 NOTE — PROGRESS NOTE ADULT - SUBJECTIVE AND OBJECTIVE BOX
Post op Day [3 ]    Patient resting without complaints.  No chest pain, SOB, N/V.  Patient feels improved from her surgery, is expressing gratitude for her improvement in symptoms.  Patient does have some R IT band distribution pain but is minor.  Patient expressing desire to go home today.    T(C): 36.6 (02-23-19 @ 05:18), Max: 37 (02-22-19 @ 16:36)  HR: 58 (02-23-19 @ 05:18) (58 - 90)  BP: 138/78 (02-23-19 @ 05:18) (116/70 - 145/86)  RR: 18 (02-23-19 @ 05:18) (16 - 18)  SpO2: 93% (02-23-19 @ 05:18) (93% - 98%)  Wt(kg): --    Exam:  Alert and Oriented, No Acute Distress  Drain removed with suture, tip intact, hemostasis at site, dressing replaced, wound is intact without drainage or erythema.  Calves Soft, Non-tender bilaterally  +PF/DF/EHL/FHL 5/5 bilat  SILT bilat                        11.6   17.38 )-----------( 288      ( 21 Feb 2019 12:37 )             34.9

## 2019-02-23 NOTE — PROGRESS NOTE ADULT - PROBLEM SELECTOR PLAN 1
PT/OT-WBAT, brace for support  IS  DVT PPx-venodynes  Pain Control  Continue Current Tx.  discharge today.    Christophe Catalan PA-C  Team Pager: #[phone]

## 2019-02-23 NOTE — PROGRESS NOTE ADULT - ASSESSMENT
Patient is a 48y old  Female who presents with a chief complaint of "I am having surgery on my lower back." (13 Feb 2019 10:34)    S/p  L5-S1 laminectomy with PLIF:  Pain control with Dilaudid PCA  Ortho spine f/up noted.    HTN:  Norvasc    Dw family. Rt leg tightness -chronic. Dc home tomorrow

## 2019-02-24 ENCOUNTER — INBOUND DOCUMENT (OUTPATIENT)
Age: 49
End: 2019-02-24

## 2019-02-24 VITALS — SYSTOLIC BLOOD PRESSURE: 123 MMHG | HEART RATE: 66 BPM | DIASTOLIC BLOOD PRESSURE: 76 MMHG

## 2019-02-24 PROCEDURE — C1889: CPT

## 2019-02-24 PROCEDURE — 97530 THERAPEUTIC ACTIVITIES: CPT

## 2019-02-24 PROCEDURE — 85027 COMPLETE CBC AUTOMATED: CPT

## 2019-02-24 PROCEDURE — 97110 THERAPEUTIC EXERCISES: CPT

## 2019-02-24 PROCEDURE — C1713: CPT

## 2019-02-24 PROCEDURE — 80048 BASIC METABOLIC PNL TOTAL CA: CPT

## 2019-02-24 PROCEDURE — 97161 PT EVAL LOW COMPLEX 20 MIN: CPT

## 2019-02-24 PROCEDURE — C1769: CPT

## 2019-02-24 PROCEDURE — 97116 GAIT TRAINING THERAPY: CPT

## 2019-02-24 PROCEDURE — 76000 FLUOROSCOPY <1 HR PHYS/QHP: CPT

## 2019-02-24 RX ADMIN — GABAPENTIN 300 MILLIGRAM(S): 400 CAPSULE ORAL at 06:18

## 2019-02-24 RX ADMIN — Medication 100 MILLIGRAM(S): at 06:18

## 2019-02-24 RX ADMIN — AMLODIPINE BESYLATE 5 MILLIGRAM(S): 2.5 TABLET ORAL at 06:19

## 2019-02-24 NOTE — PROGRESS NOTE ADULT - ASSESSMENT
S/P L5-S1 Lami/PLIF/PSF    Plan    change dressing  OOB/PT  D/C home today       Terese Callaway PA-C   Beeper    [phone]

## 2019-02-24 NOTE — PROGRESS NOTE ADULT - SUBJECTIVE AND OBJECTIVE BOX
POST OPERATIVE DAY #:  [ ]     Patient Resting without complaints /events overnight.    Exam:   Alert/Oriented, No Acute Distress        BACK:         Dressing: intact, slight serousangous drainage noted         Sensation: [x ] intact to light touch          Motor exam: [  ]                             [ ] Lower extremeity            PF          DF         EHL       FHL                                                                                            R        5/5        5/5        5/5       5/5                                                        L         5/5        5/5        5/5       5/5                                                                  Calves Soft/Non-tender bilaterally                        LABS:                        12.0   14.7<H> )-----------( 261      ( 23 Feb 2019 07:42 )             33.9<L>    02-23    138  |  99  |  18  ----------------------------<  258<H>  3.8   |  26  |  0.66

## 2019-02-25 PROBLEM — M48.07 SPINAL STENOSIS, LUMBOSACRAL REGION: Chronic | Status: ACTIVE | Noted: 2019-02-13

## 2019-02-25 PROBLEM — F45.8 OTHER SOMATOFORM DISORDERS: Chronic | Status: ACTIVE | Noted: 2019-02-13

## 2019-02-25 PROBLEM — E04.1 NONTOXIC SINGLE THYROID NODULE: Chronic | Status: ACTIVE | Noted: 2019-02-13

## 2019-02-25 PROBLEM — M43.10 SPONDYLOLISTHESIS, SITE UNSPECIFIED: Chronic | Status: ACTIVE | Noted: 2019-02-13

## 2019-03-04 ENCOUNTER — APPOINTMENT (OUTPATIENT)
Dept: ORTHOPEDIC SURGERY | Facility: CLINIC | Age: 49
End: 2019-03-04
Payer: MEDICAID

## 2019-03-04 PROCEDURE — 72100 X-RAY EXAM L-S SPINE 2/3 VWS: CPT

## 2019-03-04 PROCEDURE — 99024 POSTOP FOLLOW-UP VISIT: CPT

## 2019-03-04 RX ORDER — PREDNISONE 10 MG/1
10 TABLET ORAL
Qty: 30 | Refills: 0 | Status: ACTIVE | COMMUNITY
Start: 2019-03-04 | End: 1900-01-01

## 2019-03-04 RX ORDER — TRAMADOL HYDROCHLORIDE 50 MG/1
50 TABLET, COATED ORAL 4 TIMES DAILY
Qty: 120 | Refills: 0 | Status: ACTIVE | COMMUNITY
Start: 2019-03-04 | End: 1900-01-01

## 2019-03-04 NOTE — HISTORY OF PRESENT ILLNESS
[___ Days Post Op] : post op day #[unfilled] [Xray (Date:___)] : [unfilled] Xray -  [3] : the patient reports pain that is 3/10 in severity [Clean/Dry/Intact] : clean, dry and intact [Healed] : healed [Hardware in Good Position] : hardware in good position [Doing Well] : is doing well [No Sign of Infection] : is showing no signs of infection [Adequate Pain Control] : has adequate pain control [No ADLs] : to avoid most activities of daily living [No Work] : not to work [No Housework] : not to do housework [de-identified] : s/p L5-S1 posterior lumbar fusion and laminectomy on 02/20/19 [de-identified] : Preop pain improved. Mostly surgical and right sided hip pain.  [de-identified] : Tramadol for pain, PT, gabapentin, RTO 3-4 weeks.

## 2019-03-25 ENCOUNTER — APPOINTMENT (OUTPATIENT)
Dept: ORTHOPEDIC SURGERY | Facility: CLINIC | Age: 49
End: 2019-03-25
Payer: MEDICAID

## 2019-03-25 PROCEDURE — 99024 POSTOP FOLLOW-UP VISIT: CPT

## 2019-03-25 PROCEDURE — 72100 X-RAY EXAM L-S SPINE 2/3 VWS: CPT

## 2019-03-25 NOTE — HISTORY OF PRESENT ILLNESS
[___ Months Post Op] : [unfilled] months post op [3] : the patient reports pain that is 3/10 in severity [Clean/Dry/Intact] : clean, dry and intact [Healed] : healed [Xray (Date:___)] : [unfilled] Xray -  [Hardware in Good Position] : hardware in good position [Doing Well] : is doing well [No Sign of Infection] : is showing no signs of infection [Adequate Pain Control] : has adequate pain control [No ADLs] : to avoid most activities of daily living [No Work] : not to work [No Housework] : not to do housework [de-identified] : s/p L5-S1 posterior lumbar fusion and laminectomy on 02/20/19 [de-identified] : Preop pain improved. Surgical and Right sided buttock pain. PT alleviates symptoms. She stopped tramadol for pain control.  [de-identified] : 5/5 BLE [de-identified] : Tylenol for pain, PT, gabapentin, RTO 3 months.  Patient will require one year from date of surgery to recover and have maximal benefit.

## 2019-04-03 ENCOUNTER — APPOINTMENT (OUTPATIENT)
Dept: ORTHOPEDIC SURGERY | Facility: CLINIC | Age: 49
End: 2019-04-03
Payer: MEDICAID

## 2019-04-03 PROCEDURE — 99213 OFFICE O/P EST LOW 20 MIN: CPT

## 2019-04-03 NOTE — PHYSICAL EXAM
[de-identified] : Right shoulder exam\par \par Inspection: No swelling, ecchymosis or gross deformity.\par Skin: No masses, No lesions\par Neck:Midline tenderness. Paraspinal tenderness. Pain with any attempted neck range of motion.\par Shoulder tenderness She reports pain at every single location I palpated. Clavicle. Acromion. A.c. joint. Lateral shoulder. Trapezius muscle. Deltoid muscle. Bicipital groove..\par ROM: Patient refuses to actively move her shoulder secondary to pain. Passive range of motion I can range to 90°.\par Impingement tests: Positive Bolanos\par Strength: 5/5 abduction, external rotation, and internal rotation\par Neuro: AIN, PIN, Ulnar nerve motor intact\par Sensation: Intact to light touch in radial, median, ulnar, and axillary nerve distributions\par Vasc: 2+ radial pulse\par \par

## 2019-04-03 NOTE — DISCUSSION/SUMMARY
[de-identified] : 48-year-old female myofascial pain significant pain and sensitivity to touch everywhere in the right arm normal MRI 2 years ago insisted on pain in her shoulder holding MR arthrogram to further evaluate and rule out occult tear followup after MRI arthrogram. I asked her to obtain her EMG results as well

## 2019-04-03 NOTE — HISTORY OF PRESENT ILLNESS
[de-identified] : 47 y/o woman with c/o R shoulder pain. She is s/p ACDF C6C7 on 10/11/2017. She had a cortisone injection Dec 6 2017. This provided some relief in pain for a few weeks. Pt states she has exacerbation of right shoulder pain since 02/25/19 after starting PT, pain is described as a burning sensation. Pain radiates to right hand, associated with numbness. She denies tingling to B/L UE. She takes gabapentin 300mg TID, Pt denies recent injury/falls. pt had  essentially a normal shoulder MRI. Pt had EMG done, unable to recall name of facility\par

## 2019-04-09 ENCOUNTER — OTHER (OUTPATIENT)
Age: 49
End: 2019-04-09

## 2019-04-11 ENCOUNTER — OUTPATIENT (OUTPATIENT)
Dept: OUTPATIENT SERVICES | Facility: HOSPITAL | Age: 49
LOS: 1 days | End: 2019-04-11
Payer: MEDICAID

## 2019-04-11 ENCOUNTER — APPOINTMENT (OUTPATIENT)
Dept: RADIOLOGY | Facility: CLINIC | Age: 49
End: 2019-04-11
Payer: MEDICAID

## 2019-04-11 ENCOUNTER — APPOINTMENT (OUTPATIENT)
Dept: MRI IMAGING | Facility: CLINIC | Age: 49
End: 2019-04-11

## 2019-04-11 DIAGNOSIS — Z98.51 TUBAL LIGATION STATUS: Chronic | ICD-10-CM

## 2019-04-11 DIAGNOSIS — Z30.430 ENCOUNTER FOR INSERTION OF INTRAUTERINE CONTRACEPTIVE DEVICE: Chronic | ICD-10-CM

## 2019-04-11 DIAGNOSIS — Z98.1 ARTHRODESIS STATUS: Chronic | ICD-10-CM

## 2019-04-11 DIAGNOSIS — M75.41 IMPINGEMENT SYNDROME OF RIGHT SHOULDER: ICD-10-CM

## 2019-04-11 DIAGNOSIS — Z98.890 OTHER SPECIFIED POSTPROCEDURAL STATES: Chronic | ICD-10-CM

## 2019-04-11 DIAGNOSIS — Z90.49 ACQUIRED ABSENCE OF OTHER SPECIFIED PARTS OF DIGESTIVE TRACT: Chronic | ICD-10-CM

## 2019-04-11 DIAGNOSIS — Z90.721 ACQUIRED ABSENCE OF OVARIES, UNILATERAL: Chronic | ICD-10-CM

## 2019-04-11 PROCEDURE — 77002 NEEDLE LOCALIZATION BY XRAY: CPT

## 2019-04-11 PROCEDURE — 77002 NEEDLE LOCALIZATION BY XRAY: CPT | Mod: 26

## 2019-04-11 PROCEDURE — 23350 INJECTION FOR SHOULDER X-RAY: CPT

## 2019-04-11 PROCEDURE — 73222 MRI JOINT UPR EXTREM W/DYE: CPT | Mod: 26,RT

## 2019-04-11 PROCEDURE — 73222 MRI JOINT UPR EXTREM W/DYE: CPT

## 2019-04-12 ENCOUNTER — OTHER (OUTPATIENT)
Age: 49
End: 2019-04-12

## 2019-05-20 ENCOUNTER — APPOINTMENT (OUTPATIENT)
Dept: ORTHOPEDIC SURGERY | Facility: CLINIC | Age: 49
End: 2019-05-20

## 2019-05-23 ENCOUNTER — APPOINTMENT (OUTPATIENT)
Dept: ORTHOPEDIC SURGERY | Facility: CLINIC | Age: 49
End: 2019-05-23
Payer: COMMERCIAL

## 2019-05-23 VITALS
HEART RATE: 73 BPM | BODY MASS INDEX: 33.66 KG/M2 | DIASTOLIC BLOOD PRESSURE: 76 MMHG | SYSTOLIC BLOOD PRESSURE: 110 MMHG | HEIGHT: 63 IN | WEIGHT: 190 LBS

## 2019-05-23 PROCEDURE — 99214 OFFICE O/P EST MOD 30 MIN: CPT | Mod: 25

## 2019-05-23 PROCEDURE — 20610 DRAIN/INJ JOINT/BURSA W/O US: CPT | Mod: RT

## 2019-05-23 PROCEDURE — 72100 X-RAY EXAM L-S SPINE 2/3 VWS: CPT

## 2019-05-23 RX ORDER — GABAPENTIN 300 MG/1
300 CAPSULE ORAL
Qty: 90 | Refills: 3 | Status: ACTIVE | COMMUNITY
Start: 1900-01-01 | End: 1900-01-01

## 2019-05-23 NOTE — HISTORY OF PRESENT ILLNESS
[Bending] : worsened by bending [Prolonged Sitting] : worsened by prolonged sitting [Walking] : worsened by walking [Acetaminophen] : relieved by acetaminophen [Physical Therapy] : relieved by physical therapy [Rest] : relieved by rest [4] : an average pain level of 4/10 [3] : a minimum pain level of 3/10 [5] : a maximum pain level of 5/10 [de-identified] : Pt is s/p L5-S1 posterior lumbar fusion and laminectomy on 02/20/19. Presents today for f/u. Preop pain improved. She c/o Surgical and Right sided buttock pain to posterior aspect of RLE to ankle, intermittent numbness on RLE that last for a few seconds . Pt denies numbness,tingling to B/L LE. PT participates with PT X2/week. She Tylenol, Gabapentin 300 mg Pt denies Loss of bladder control, bladder incontinence or urinary retention.\par \par  [Ataxia] : no ataxia [Incontinence] : no incontinence [Loss of Dexterity] : good dexterity [Urinary Ret.] : no urinary retention

## 2019-05-23 NOTE — PHYSICAL EXAM
[LE] : Sensory: Intact in bilateral lower extremities [ALL] : dorsalis pedis, posterior tibial, femoral, popliteal, and radial 2+ and symmetric bilaterally [Normal] : Oriented to person, place, and time, insight and judgement were intact and the affect was normal [] : Motor: [NL] : Motor strength of the left lower extremity was normal [Motor Strength Lower Extremities] : right (5/5) [Perez's Sign] : negative Perez's sign [Pronator Drift] : negative pronator drift [SLR] : negative straight leg raise [de-identified] : Lumbar ROM limited, mild pain\par TTP right  paraspinals L region\par NTTP L spine \par Skin intact L spine. No rashes, ulcers, blisters. \par No lymphedema. \par TTP over Right hip\par  [de-identified] : 2 views AP and Lat of LS Spine from [id number] 05/23/19. Read and dictated by Dr. Gabriel Andino Board Certified orthopaedic surgeon PLIF L45\par \par 03/25/19 Xray -  (2 views AP and Lat of LS Spine from [id number]. Read and dictated by Dr. Gabriel Andino Board Certified orthopaedic surgeon... ) hardware in good position.

## 2019-05-23 NOTE — DISCUSSION/SUMMARY
[de-identified] : Tylenol for pain, PT, gabapentin, RTO 3 months. Patient will require one year from date of surgery to recover and have maximal benefit. \par Diagnosis, prognosis, natural history and treatment was discussed with patient. Patient was advised if the following symptoms develop: chills, fever, loss of bladder control, bladder incontinence or urinary retention, numbness/tingling or weakness is present in upper or lower extremities, to go to the nearest ER and to call the office immediately to inform us.\par \par

## 2019-05-23 NOTE — PROCEDURE
[Injection] : Injection [Right] : of the right [Greater Trochanter] : greater trochanteric bursa [Inflammation] : Inflammation [Patient] : patient [Risk] : risk [Benefits] : benefits [Alternatives] : alternatives [Verbal Consent Obtained] : verbal consent was obtained prior to the procedure [Alcohol] : Alcohol [Betadine] : Betadine [Ethyl Chloride Spray] : ethyl chloride spray was used as a topical anesthetic [1%] : 1% lidocaine [Lateral] : lateral [1% Lidocaine___(mL)] : [unfilled] mL of 1% Lidocaine [Methylpred. 40mg/mL___(mL)] : [unfilled] mL 40mg/mL methylprednisolone [Bandage Applied] : a bandage [Tolerated Well] : The patient tolerated the procedure well [None] : none

## 2019-07-08 ENCOUNTER — RX RENEWAL (OUTPATIENT)
Age: 49
End: 2019-07-08

## 2019-07-19 ENCOUNTER — APPOINTMENT (OUTPATIENT)
Dept: ORTHOPEDIC SURGERY | Facility: CLINIC | Age: 49
End: 2019-07-19
Payer: COMMERCIAL

## 2019-07-19 DIAGNOSIS — M46.1 SACROILIITIS, NOT ELSEWHERE CLASSIFIED: ICD-10-CM

## 2019-07-19 PROCEDURE — 72100 X-RAY EXAM L-S SPINE 2/3 VWS: CPT

## 2019-07-19 PROCEDURE — 99214 OFFICE O/P EST MOD 30 MIN: CPT

## 2019-07-19 RX ORDER — PREDNISONE 10 MG/1
10 TABLET ORAL
Qty: 30 | Refills: 0 | Status: ACTIVE | COMMUNITY
Start: 2019-07-19 | End: 1900-01-01

## 2019-07-19 NOTE — HISTORY OF PRESENT ILLNESS
[4] : an average pain level of 4/10 [3] : a minimum pain level of 3/10 [5] : a maximum pain level of 5/10 [Bending] : worsened by bending [Prolonged Sitting] : worsened by prolonged sitting [Walking] : worsened by walking [Acetaminophen] : relieved by acetaminophen [Physical Therapy] : relieved by physical therapy [Rest] : relieved by rest [de-identified] : Pt is s/p L5-S1 posterior lumbar fusion and laminectomy on 02/20/19. Presents today for f/u. Preop pain improved. New onset for past week right hip and groin pain .  No relief with PT. Pt denies numbness,tingling to B/L LE. PT participates with PT X2/week. She Tylenol, Gabapentin 300 mg Pt denies Loss of bladder control, bladder incontinence or urinary retention.\par \par  [Ataxia] : no ataxia [Incontinence] : no incontinence [Loss of Dexterity] : good dexterity [Urinary Ret.] : no urinary retention

## 2019-07-19 NOTE — DISCUSSION/SUMMARY
[de-identified] : Dr. Souza to evaluate right hip, and Dr. Reed pain management evaluate for SI joint. \par \par \par Diagnosis, prognosis, natural history and treatment was discussed with patient. Patient was advised if the following symptoms develop: chills, fever, loss of bladder control, bladder incontinence or urinary retention, numbness/tingling or weakness is present in upper or lower extremities, to go to the nearest ER and to call the office immediately to inform us.\par \par

## 2019-07-19 NOTE — PHYSICAL EXAM
[] : Motor: [LE] : Sensory: Intact in bilateral lower extremities [ALL] : dorsalis pedis, posterior tibial, femoral, popliteal, and radial 2+ and symmetric bilaterally [Normal] : Oriented to person, place, and time, insight and judgement were intact and the affect was normal [LE Motor Strength NL] : Motor strength of the bilateral lower extremities was normal [Perez's Sign] : negative Perez's sign [Pronator Drift] : negative pronator drift [SLR] : negative straight leg raise [de-identified] : Lumbar ROM limited, mild pain\par TTP right  paraspinals L region\par NTTP L spine \par Skin intact L spine. No rashes, ulcers, blisters. \par No lymphedema. \par TTP over Right hip\par Painful ROM of Rigth hip and positive Justin\par  [de-identified] : 2 views AP and Lat of Lumbar Spine from [id number] 07/19/19. Read and dictated by Dr. Gabriel Andino Board Certified Orthopaedic surgeon PLIF L45 \par \par 2 views AP and Lat of LS Spine from [id number] 05/23/19. Read and dictated by Dr. Gabriel Andino Board Certified orthopaedic surgeon PLIF L45\par \par 03/25/19 Xray -  (2 views AP and Lat of LS Spine from [id number]. Read and dictated by Dr. Gabriel Andino Board Certified orthopaedic surgeon... ) hardware in good position.

## 2019-07-31 ENCOUNTER — APPOINTMENT (OUTPATIENT)
Dept: ORTHOPEDIC SURGERY | Facility: CLINIC | Age: 49
End: 2019-07-31
Payer: COMMERCIAL

## 2019-07-31 PROCEDURE — 99214 OFFICE O/P EST MOD 30 MIN: CPT

## 2019-07-31 PROCEDURE — 73502 X-RAY EXAM HIP UNI 2-3 VIEWS: CPT | Mod: RT

## 2019-07-31 NOTE — HISTORY OF PRESENT ILLNESS
[de-identified] : 49yo female presents complaining of right lateral hip pain for 3 months. No injuries reported. She status post lumbar fusion by Dr. Andino earlier this year. His pain lateral aspect of her hip worse with lying on that side at night. 2 weeks ago she underwent greater trochanteric bursa injection by Dr. Andino with a great relief. She is going to physical therapy for her lower back. She feels like the therapy exercises may have aggravated her hip. She is taking anti-inflammatory medication.

## 2019-07-31 NOTE — DISCUSSION/SUMMARY
[de-identified] : \par 48-year-old female seen for multiple problems over many years including knee shoulder and now had normal imaging findings. She has been referred to pain management in the past. She is now 6 months status post spinal surgery with continued leg pain. Recommend physical therapy for her bursitis symptoms. Again recommend pain management evaluation. Followup as needed all questions answered.

## 2019-07-31 NOTE — PHYSICAL EXAM
[de-identified] : Right hip exam\par \par Skin: Clean/dry and intact\par Inspection: No obvious deformity, no swelling, no ecchymosis.\par Tenderness:  ++ tenderness over greater trochanter/glut medius insertion. No tenderness pubic symphysis, pubic tubercle, hip flexors. No ttp ischial tuberosity or buttock. No ttp over the ASIS/Illiac crest.\par ROM: 0-120°. Internal rotation 30 external rotation 70\par Painful ROM: None\par Additional tests: No pain with circumduction negative impingement test at 90° mildly positive impingement test at 60° negative Justin [facility]\par Strength: 5/5 hip flexion/ADD/ABD/Q/H/TA/GS/EHL\par Neuro: Sensation in tact to light touch throughout in dp/sp/tib/gerard/saph distributions\par Pulses: 2+ DP/PT pulses [de-identified] : \par The following radiographs were ordered and read by me during this patients visit. I reviewed each radiograph in detail with the patient and discussed the findings as highlighted below. \par \par AP pelvis AP lateral right hip were obtained spine hardware placement and joint spaces are well maintained. Unremarkable.\par \par

## 2019-08-26 ENCOUNTER — APPOINTMENT (OUTPATIENT)
Dept: ORTHOPEDIC SURGERY | Facility: CLINIC | Age: 49
End: 2019-08-26
Payer: SELF-PAY

## 2019-08-26 PROCEDURE — 99214 OFFICE O/P EST MOD 30 MIN: CPT

## 2019-08-26 PROCEDURE — 72100 X-RAY EXAM L-S SPINE 2/3 VWS: CPT

## 2019-09-13 NOTE — ASU PREOP CHECKLIST - BMI (KG/M2)
From: Adriane Leong  To: Eliane Romero MD  Sent: 9/13/2019 9:54 AM CDT  Subject: Other    Goodmorning!    Here are my levels for this last week. I have been taking the peanut butter at night, the one teaspoon.     9/9 = 213 @ 9:45 ( I took it after my doctors appointment with Dr. Sanders, that is why it was so late)  9/9 =154 @ 2:05 ( Monday I was not feeling good. 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 Then after we clean the office we go to dinner. I came home and went right to bed.   Got up at 11:30 and remember I needed to take my insulin.  9/10=187 @ 11:34pm  9/1159=410 @ 8:31  9/1163=942 @ 11:45  9/1112=457 @ 5:45  9/1197=053 @ 8:33  9/1282=190 @ 8:23  9/1221=024 @ 11:42  9/1233=754 @ 1:09  9/1257=322 @ 3:29  9/1288=428 @ 6:10  9/1228=795 @ 10:09  9/1342=558 @ 9:29  This has not been a good week for me. I have not been feeling the best and I'm starting to cough a lot more. I usually try to take my morning insulin around 7:30 am every morning, but I have been sleeping in a bit as I am not sleeping so well between my coughing and my face flushing and my headaches act night.     I hope this is what you wanted. Please let me know if there is anything else.    Thank you as always!!  Have a great weekend!!  Adriane   33.7

## 2019-10-31 ENCOUNTER — APPOINTMENT (OUTPATIENT)
Dept: ORTHOPEDIC SURGERY | Facility: CLINIC | Age: 49
End: 2019-10-31
Payer: COMMERCIAL

## 2019-10-31 DIAGNOSIS — M25.551 PAIN IN RIGHT HIP: ICD-10-CM

## 2019-10-31 PROCEDURE — 72082 X-RAY EXAM ENTIRE SPI 2/3 VW: CPT

## 2019-10-31 PROCEDURE — 99214 OFFICE O/P EST MOD 30 MIN: CPT

## 2019-11-12 ENCOUNTER — FORM ENCOUNTER (OUTPATIENT)
Age: 49
End: 2019-11-12

## 2019-11-13 ENCOUNTER — APPOINTMENT (OUTPATIENT)
Dept: MRI IMAGING | Facility: CLINIC | Age: 49
End: 2019-11-13
Payer: COMMERCIAL

## 2019-11-13 ENCOUNTER — OUTPATIENT (OUTPATIENT)
Dept: OUTPATIENT SERVICES | Facility: HOSPITAL | Age: 49
LOS: 1 days | End: 2019-11-13
Payer: COMMERCIAL

## 2019-11-13 ENCOUNTER — APPOINTMENT (OUTPATIENT)
Dept: RADIOLOGY | Facility: CLINIC | Age: 49
End: 2019-11-13
Payer: COMMERCIAL

## 2019-11-13 DIAGNOSIS — Z90.49 ACQUIRED ABSENCE OF OTHER SPECIFIED PARTS OF DIGESTIVE TRACT: Chronic | ICD-10-CM

## 2019-11-13 DIAGNOSIS — Z90.721 ACQUIRED ABSENCE OF OVARIES, UNILATERAL: Chronic | ICD-10-CM

## 2019-11-13 DIAGNOSIS — M25.551 PAIN IN RIGHT HIP: ICD-10-CM

## 2019-11-13 DIAGNOSIS — Z30.430 ENCOUNTER FOR INSERTION OF INTRAUTERINE CONTRACEPTIVE DEVICE: Chronic | ICD-10-CM

## 2019-11-13 DIAGNOSIS — Z98.1 ARTHRODESIS STATUS: Chronic | ICD-10-CM

## 2019-11-13 DIAGNOSIS — Z98.890 OTHER SPECIFIED POSTPROCEDURAL STATES: Chronic | ICD-10-CM

## 2019-11-13 DIAGNOSIS — Z98.51 TUBAL LIGATION STATUS: Chronic | ICD-10-CM

## 2019-11-13 PROCEDURE — 73722 MRI JOINT OF LWR EXTR W/DYE: CPT | Mod: 26,RT

## 2019-11-13 PROCEDURE — 27093 INJECTION FOR HIP X-RAY: CPT

## 2019-11-13 PROCEDURE — 77002 NEEDLE LOCALIZATION BY XRAY: CPT | Mod: 26

## 2019-11-13 PROCEDURE — 73722 MRI JOINT OF LWR EXTR W/DYE: CPT

## 2019-11-13 PROCEDURE — 77002 NEEDLE LOCALIZATION BY XRAY: CPT

## 2019-11-22 ENCOUNTER — APPOINTMENT (OUTPATIENT)
Dept: ORTHOPEDIC SURGERY | Facility: CLINIC | Age: 49
End: 2019-11-22
Payer: COMMERCIAL

## 2019-11-22 DIAGNOSIS — M70.61 TROCHANTERIC BURSITIS, RIGHT HIP: ICD-10-CM

## 2019-11-22 PROCEDURE — 99213 OFFICE O/P EST LOW 20 MIN: CPT

## 2019-11-22 NOTE — PHYSICAL EXAM
[de-identified] : Right hip exam\par \par Skin: Clean/dry and intact\par Inspection: No obvious deformity, no swelling, no ecchymosis.\par Tenderness: ++ tenderness over greater trochanter/glut medius insertion. No tenderness pubic symphysis, pubic tubercle, hip flexors. No ttp ischial tuberosity or buttock. No ttp over the ASIS/Illiac crest.\par ROM: 0-120°. Internal rotation 30 external rotation 70\par Painful ROM: None\par Additional tests: No pain with circumduction negative impingement test at 90° mildly positive impingement test at 60° negative Justin [facility]\par Strength: 5/5 hip flexion/ADD/ABD/Q/H/TA/GS/EHL\par Neuro: Sensation in tact to light touch throughout in dp/sp/tib/gerard/saph distributions\par Pulses: 2+ DP/PT pulses  [de-identified] : MRI right hip reviewed. There is a anterior superior labral tear. Small cam deformity\par \par

## 2019-11-22 NOTE — DISCUSSION/SUMMARY
[de-identified] : 49-year-old female right hip labral tear. We discussed that the labral tear likely were present a small portion of the pain she is experiencing her body. She does have some mild impingement groin pain. She has history of spine surgery and pain in multiple places. I again recommended pain management. She is requesting surgical intervention for her labral tear. We discussed the surgery is only likely intact a small portion of her symptoms she expressed understanding of this. Surgery is a right hip arthroscopy labral debridement versus repair. We discussed the surgery postoperative protocol restrictions at length. Risks benefits alternatives of surgery discussed including but not limited to risks such as bleeding infection nerve and vessel injury continued pain stiffness need for future surgery medical [place] location such as DVT PE anesthesia applications. All questions answered she will schedule at her convenienc

## 2019-11-22 NOTE — HISTORY OF PRESENT ILLNESS
[de-identified] : 48yo female presents complaining of right lateral hip pain for 3 months. No injuries reported. She status post lumbar fusion by Dr. Andino earlier this year. His pain lateral aspect of her hip worse with lying on that side at night. 2 weeks ago she underwent greater trochanteric bursa injection by Dr. Andino with a great relief. She is going to physical therapy for her lower back. She feels like the therapy exercises may have aggravated her hip. She is taking anti-inflammatory medication.  She would like to discuss surgical options at this time after multiple failed conservative treatments.\par

## 2019-12-04 ENCOUNTER — OUTPATIENT (OUTPATIENT)
Dept: OUTPATIENT SERVICES | Facility: HOSPITAL | Age: 49
LOS: 1 days | Discharge: HOME | End: 2019-12-04
Payer: MEDICAID

## 2019-12-04 VITALS
HEART RATE: 96 BPM | HEIGHT: 63 IN | DIASTOLIC BLOOD PRESSURE: 83 MMHG | WEIGHT: 200.62 LBS | RESPIRATION RATE: 17 BRPM | TEMPERATURE: 98 F | SYSTOLIC BLOOD PRESSURE: 137 MMHG | OXYGEN SATURATION: 97 %

## 2019-12-04 DIAGNOSIS — Z90.721 ACQUIRED ABSENCE OF OVARIES, UNILATERAL: Chronic | ICD-10-CM

## 2019-12-04 DIAGNOSIS — Z90.49 ACQUIRED ABSENCE OF OTHER SPECIFIED PARTS OF DIGESTIVE TRACT: Chronic | ICD-10-CM

## 2019-12-04 DIAGNOSIS — Z01.818 ENCOUNTER FOR OTHER PREPROCEDURAL EXAMINATION: ICD-10-CM

## 2019-12-04 DIAGNOSIS — S73.191A OTHER SPRAIN OF RIGHT HIP, INITIAL ENCOUNTER: ICD-10-CM

## 2019-12-04 DIAGNOSIS — Z98.1 ARTHRODESIS STATUS: Chronic | ICD-10-CM

## 2019-12-04 DIAGNOSIS — Z30.430 ENCOUNTER FOR INSERTION OF INTRAUTERINE CONTRACEPTIVE DEVICE: Chronic | ICD-10-CM

## 2019-12-04 DIAGNOSIS — M70.61 TROCHANTERIC BURSITIS, RIGHT HIP: ICD-10-CM

## 2019-12-04 DIAGNOSIS — Z98.51 TUBAL LIGATION STATUS: Chronic | ICD-10-CM

## 2019-12-04 DIAGNOSIS — Z98.890 OTHER SPECIFIED POSTPROCEDURAL STATES: Chronic | ICD-10-CM

## 2019-12-04 LAB
ANION GAP SERPL CALC-SCNC: 9 MMOL/L — SIGNIFICANT CHANGE UP (ref 5–17)
APTT BLD: 32.6 SEC — SIGNIFICANT CHANGE UP (ref 28.5–37)
BASOPHILS # BLD AUTO: 0.07 K/UL — SIGNIFICANT CHANGE UP (ref 0–0.2)
BASOPHILS NFR BLD AUTO: 0.8 % — SIGNIFICANT CHANGE UP (ref 0–2)
BUN SERPL-MCNC: 12 MG/DL — SIGNIFICANT CHANGE UP (ref 7–23)
CALCIUM SERPL-MCNC: 9.8 MG/DL — SIGNIFICANT CHANGE UP (ref 8.5–10.1)
CHLORIDE SERPL-SCNC: 106 MMOL/L — SIGNIFICANT CHANGE UP (ref 96–108)
CO2 SERPL-SCNC: 27 MMOL/L — SIGNIFICANT CHANGE UP (ref 22–31)
CREAT SERPL-MCNC: 0.87 MG/DL — SIGNIFICANT CHANGE UP (ref 0.5–1.3)
EOSINOPHIL # BLD AUTO: 0.14 K/UL — SIGNIFICANT CHANGE UP (ref 0–0.5)
EOSINOPHIL NFR BLD AUTO: 1.6 % — SIGNIFICANT CHANGE UP (ref 0–6)
GLUCOSE SERPL-MCNC: 129 MG/DL — HIGH (ref 70–99)
HCG UR QL: NEGATIVE — SIGNIFICANT CHANGE UP
HCT VFR BLD CALC: 39.3 % — SIGNIFICANT CHANGE UP (ref 34.5–45)
HGB BLD-MCNC: 13 G/DL — SIGNIFICANT CHANGE UP (ref 11.5–15.5)
IMM GRANULOCYTES NFR BLD AUTO: 0.2 % — SIGNIFICANT CHANGE UP (ref 0–1.5)
LYMPHOCYTES # BLD AUTO: 3.31 K/UL — HIGH (ref 1–3.3)
LYMPHOCYTES # BLD AUTO: 38.4 % — SIGNIFICANT CHANGE UP (ref 13–44)
MCHC RBC-ENTMCNC: 28.3 PG — SIGNIFICANT CHANGE UP (ref 27–34)
MCHC RBC-ENTMCNC: 33.1 GM/DL — SIGNIFICANT CHANGE UP (ref 32–36)
MCV RBC AUTO: 85.6 FL — SIGNIFICANT CHANGE UP (ref 80–100)
MONOCYTES # BLD AUTO: 0.5 K/UL — SIGNIFICANT CHANGE UP (ref 0–0.9)
MONOCYTES NFR BLD AUTO: 5.8 % — SIGNIFICANT CHANGE UP (ref 2–14)
NEUTROPHILS # BLD AUTO: 4.58 K/UL — SIGNIFICANT CHANGE UP (ref 1.8–7.4)
NEUTROPHILS NFR BLD AUTO: 53.2 % — SIGNIFICANT CHANGE UP (ref 43–77)
NRBC # BLD: 0 /100 WBCS — SIGNIFICANT CHANGE UP (ref 0–0)
PLATELET # BLD AUTO: 347 K/UL — SIGNIFICANT CHANGE UP (ref 150–400)
POTASSIUM SERPL-MCNC: 3.3 MMOL/L — LOW (ref 3.5–5.3)
POTASSIUM SERPL-SCNC: 3.3 MMOL/L — LOW (ref 3.5–5.3)
RBC # BLD: 4.59 M/UL — SIGNIFICANT CHANGE UP (ref 3.8–5.2)
RBC # FLD: 13.6 % — SIGNIFICANT CHANGE UP (ref 10.3–14.5)
SODIUM SERPL-SCNC: 142 MMOL/L — SIGNIFICANT CHANGE UP (ref 135–145)
WBC # BLD: 8.62 K/UL — SIGNIFICANT CHANGE UP (ref 3.8–10.5)
WBC # FLD AUTO: 8.62 K/UL — SIGNIFICANT CHANGE UP (ref 3.8–10.5)

## 2019-12-04 PROCEDURE — 93010 ELECTROCARDIOGRAM REPORT: CPT

## 2019-12-04 RX ORDER — AMLODIPINE BESYLATE 2.5 MG/1
1 TABLET ORAL
Qty: 0 | Refills: 0 | DISCHARGE

## 2019-12-04 NOTE — H&P PST ADULT - HISTORY OF PRESENT ILLNESS
49 year old female with hypertension, diabetes, hyperlipidemia c/o of pain to right hip. She is scheduled for a right hip arthroscopic surgery on 12/12/19.

## 2019-12-04 NOTE — H&P PST ADULT - NSICDXPROBLEM_GEN_ALL_CORE_FT
PROBLEM DIAGNOSES  Problem: Other sprain of right hip  Assessment and Plan: right hip arthroscopy labral debridement vs repair on 12/12

## 2019-12-04 NOTE — H&P PST ADULT - ASSESSMENT
49 year old female with hypertension, diabetes, hyperlipidemia c/o of pain to right hip. She is scheduled for a right hip arthroscopic surgery on 19.    CAPRINI SCORE [CLOT]    AGE RELATED RISK FACTORS                                                       MOBILITY RELATED FACTORS  [x ] Age 41-60 years                                            (1 Point)                  [ ] Bed rest                                                        (1 Point)  [ ] Age: 61-74 years                                           (2 Points)                 [ ] Plaster cast                                                   (2 Points)  [ ] Age= 75 years                                              (3 Points)                 [ ] Bed bound for more than 72 hours                 (2 Points)    DISEASE RELATED RISK FACTORS                                               GENDER SPECIFIC FACTORS  [ ] Edema in the lower extremities                       (1 Point)                  [ ] Pregnancy                                                     (1 Point)  [ ] Varicose veins                                               (1 Point)                  [ ] Post-partum < 6 weeks                                   (1 Point)             [ x] BMI > 25 Kg/m2                                            (1 Point)                  [ ] Hormonal therapy  or oral contraception          (1 Point)                 [ ] Sepsis (in the previous month)                        (1 Point)                  [ ] History of pregnancy complications                 (1 point)  [ ] Pneumonia or serious lung disease                                               [ ] Unexplained or recurrent                     (1 Point)           (in the previous month)                               (1 Point)  [ ] Abnormal pulmonary function test                     (1 Point)                 SURGERY RELATED RISK FACTORS  [ ] Acute myocardial infarction                              (1 Point)                 [ ]  Section                                             (1 Point)  [ ] Congestive heart failure (in the previous month)  (1 Point)               [ ] Minor surgery                                                  (1 Point)   [ ] Inflammatory bowel disease                             (1 Point)                 [x] Arthroscopic surgery                                        (2 Points)  [ ] Central venous access                                      (2 Points)                [ ] General surgery lasting more than 45 minutes   (2 Points)       [ ] Stroke (in the previous month)                          (5 Points)               [ ] Elective arthroplasty                                         (5 Points)                                                                                                                                               HEMATOLOGY RELATED FACTORS                                                 TRAUMA RELATED RISK FACTORS  [ ] Prior episodes of VTE                                     (3 Points)                [ ] Fracture of the hip, pelvis, or leg                       (5 Points)  [ ] Positive family history for VTE                         (3 Points)                 [ ] Acute spinal cord injury (in the previous month)  (5 Points)  [ ] Prothrombin 26228 A                                     (3 Points)                 [ ] Paralysis  (less than 1 month)                             (5 Points)  [ ] Factor V Leiden                                             (3 Points)                  [ ] Multiple Trauma within 1 month                        (5 Points)  [ ] Lupus anticoagulants                                     (3 Points)                                                           [ ] Anticardiolipin antibodies                               (3 Points)                                                       [ ] High homocysteine in the blood                      (3 Points)                                             [ ] Other congenital or acquired thrombophilia      (3 Points)                                                [ ] Heparin induced thrombocytopenia                  (3 Points)                                          Total Score [    4      ]    Caprini Score 0 - 2:  Low Risk, No VTE Prophylaxis required for most patients, encourage ambulation  Caprini Score 3 - 6:  At Risk, pharmacologic VTE prophylaxis is indicated for most patients (in the absence of a contraindication)  Caprini Score Greater than or = 7:  High Risk, pharmacologic VTE prophylaxis is indicated for most patients (in the absence of a contraindication)

## 2019-12-04 NOTE — H&P PST ADULT - NSICDXPASTSURGICALHX_GEN_ALL_CORE_FT
PAST SURGICAL HISTORY:  Encounter for insertion of mirena IUD 2017    H/O tubal ligation 2008 [facility]    History of arthroscopy of right knee 9/2018    S/P cervical spinal fusion 10/2017 C6-C7 ACDF    S/P laparoscopic cholecystectomy     S/P left oophorectomy 1/2018 - benign, per pt/family

## 2019-12-04 NOTE — H&P PST ADULT - NSICDXPASTMEDICALHX_GEN_ALL_CORE_FT
PAST MEDICAL HISTORY:  Cervical disc displacement     Cervical radiculopathy     Chronic pain had steroid injections 1 month ago    Gall stones s/p cholecystectomy    Globus sensation c/o dysphagia - s/p laryngoscopy 12/2018 Dr Jackson - wn    Hypertension     Menorrhagia with irregular cycle Mirena IUD placed    Migraines     Shoulder impingement, right     Shoulder injury per pt/family - while in Rehab after 10/2017 C6-C7 ACDF, television fell from mount on wall and struck her in head/right shoulder    Spinal stenosis of lumbosacral region     Spondylolisthesis     Thyroid nodule s/p biopsy 2/2019 - nontoxic

## 2019-12-11 ENCOUNTER — FORM ENCOUNTER (OUTPATIENT)
Age: 49
End: 2019-12-11

## 2019-12-12 ENCOUNTER — APPOINTMENT (OUTPATIENT)
Dept: ORTHOPEDIC SURGERY | Facility: HOSPITAL | Age: 49
End: 2019-12-12

## 2019-12-12 ENCOUNTER — OUTPATIENT (OUTPATIENT)
Dept: OUTPATIENT SERVICES | Facility: HOSPITAL | Age: 49
LOS: 1 days | Discharge: HOME | End: 2019-12-12
Payer: MEDICAID

## 2019-12-12 VITALS
SYSTOLIC BLOOD PRESSURE: 121 MMHG | HEART RATE: 78 BPM | WEIGHT: 197.98 LBS | DIASTOLIC BLOOD PRESSURE: 82 MMHG | TEMPERATURE: 98 F | OXYGEN SATURATION: 97 % | HEIGHT: 63 IN | RESPIRATION RATE: 14 BRPM

## 2019-12-12 VITALS
RESPIRATION RATE: 16 BRPM | SYSTOLIC BLOOD PRESSURE: 103 MMHG | DIASTOLIC BLOOD PRESSURE: 51 MMHG | OXYGEN SATURATION: 96 % | HEART RATE: 78 BPM

## 2019-12-12 DIAGNOSIS — Z98.890 OTHER SPECIFIED POSTPROCEDURAL STATES: Chronic | ICD-10-CM

## 2019-12-12 DIAGNOSIS — Z90.49 ACQUIRED ABSENCE OF OTHER SPECIFIED PARTS OF DIGESTIVE TRACT: Chronic | ICD-10-CM

## 2019-12-12 DIAGNOSIS — Z90.721 ACQUIRED ABSENCE OF OVARIES, UNILATERAL: Chronic | ICD-10-CM

## 2019-12-12 DIAGNOSIS — Z98.51 TUBAL LIGATION STATUS: Chronic | ICD-10-CM

## 2019-12-12 DIAGNOSIS — Z30.430 ENCOUNTER FOR INSERTION OF INTRAUTERINE CONTRACEPTIVE DEVICE: Chronic | ICD-10-CM

## 2019-12-12 DIAGNOSIS — Z98.1 ARTHRODESIS STATUS: Chronic | ICD-10-CM

## 2019-12-12 LAB
ANION GAP SERPL CALC-SCNC: 5 MMOL/L — SIGNIFICANT CHANGE UP (ref 5–17)
BUN SERPL-MCNC: 12 MG/DL — SIGNIFICANT CHANGE UP (ref 7–23)
CALCIUM SERPL-MCNC: 9 MG/DL — SIGNIFICANT CHANGE UP (ref 8.5–10.1)
CHLORIDE SERPL-SCNC: 105 MMOL/L — SIGNIFICANT CHANGE UP (ref 96–108)
CO2 SERPL-SCNC: 29 MMOL/L — SIGNIFICANT CHANGE UP (ref 22–31)
CREAT SERPL-MCNC: 0.8 MG/DL — SIGNIFICANT CHANGE UP (ref 0.5–1.3)
GLUCOSE BLDC GLUCOMTR-MCNC: 152 MG/DL — HIGH (ref 70–99)
GLUCOSE SERPL-MCNC: 162 MG/DL — HIGH (ref 70–99)
POTASSIUM SERPL-MCNC: 3.3 MMOL/L — LOW (ref 3.5–5.3)
POTASSIUM SERPL-SCNC: 3.3 MMOL/L — LOW (ref 3.5–5.3)
SODIUM SERPL-SCNC: 139 MMOL/L — SIGNIFICANT CHANGE UP (ref 135–145)

## 2019-12-12 PROCEDURE — 76000 FLUOROSCOPY <1 HR PHYS/QHP: CPT | Mod: 26

## 2019-12-12 PROCEDURE — 29916 HIP ARTHRO W/LABRAL REPAIR: CPT | Mod: RT

## 2019-12-12 RX ORDER — SODIUM CHLORIDE 9 MG/ML
3 INJECTION INTRAMUSCULAR; INTRAVENOUS; SUBCUTANEOUS EVERY 8 HOURS
Refills: 0 | Status: DISCONTINUED | OUTPATIENT
Start: 2019-12-12 | End: 2019-12-12

## 2019-12-12 RX ORDER — OXYCODONE HYDROCHLORIDE 5 MG/1
1 TABLET ORAL
Qty: 20 | Refills: 0
Start: 2019-12-12 | End: 2019-12-16

## 2019-12-12 RX ORDER — ATORVASTATIN CALCIUM 80 MG/1
1 TABLET, FILM COATED ORAL
Qty: 0 | Refills: 0 | DISCHARGE

## 2019-12-12 RX ORDER — ASPIRIN/CALCIUM CARB/MAGNESIUM 324 MG
1 TABLET ORAL
Qty: 30 | Refills: 0
Start: 2019-12-12 | End: 2020-01-10

## 2019-12-12 RX ORDER — AMLODIPINE BESYLATE 2.5 MG/1
1 TABLET ORAL
Qty: 0 | Refills: 0 | DISCHARGE

## 2019-12-12 RX ORDER — ONDANSETRON 8 MG/1
1 TABLET, FILM COATED ORAL
Qty: 28 | Refills: 0
Start: 2019-12-12 | End: 2019-12-18

## 2019-12-12 RX ORDER — SODIUM CHLORIDE 9 MG/ML
1000 INJECTION, SOLUTION INTRAVENOUS
Refills: 0 | Status: DISCONTINUED | OUTPATIENT
Start: 2019-12-12 | End: 2020-01-03

## 2019-12-12 RX ORDER — SODIUM CHLORIDE 9 MG/ML
1000 INJECTION, SOLUTION INTRAVENOUS
Refills: 0 | Status: DISCONTINUED | OUTPATIENT
Start: 2019-12-12 | End: 2019-12-12

## 2019-12-12 RX ORDER — HYDROMORPHONE HYDROCHLORIDE 2 MG/ML
0.5 INJECTION INTRAMUSCULAR; INTRAVENOUS; SUBCUTANEOUS
Refills: 0 | Status: DISCONTINUED | OUTPATIENT
Start: 2019-12-12 | End: 2019-12-12

## 2019-12-12 RX ORDER — CYCLOBENZAPRINE HYDROCHLORIDE 10 MG/1
1 TABLET, FILM COATED ORAL
Qty: 0 | Refills: 0 | DISCHARGE

## 2019-12-12 RX ORDER — LISINOPRIL 2.5 MG/1
1 TABLET ORAL
Qty: 0 | Refills: 0 | DISCHARGE

## 2019-12-12 RX ORDER — DOCUSATE SODIUM 100 MG
1 CAPSULE ORAL
Qty: 42 | Refills: 0
Start: 2019-12-12 | End: 2019-12-25

## 2019-12-12 RX ORDER — METFORMIN HYDROCHLORIDE 850 MG/1
1 TABLET ORAL
Qty: 0 | Refills: 0 | DISCHARGE

## 2019-12-12 RX ADMIN — SODIUM CHLORIDE 50 MILLILITER(S): 9 INJECTION, SOLUTION INTRAVENOUS at 11:54

## 2019-12-12 NOTE — ASU PATIENT PROFILE, ADULT - VISION (WITH CORRECTIVE LENSES IF THE PATIENT USUALLY WEARS THEM):
eye glasses/Partially impaired: cannot see medication labels or newsprint, but can see obstacles in path, and the surrounding layout; can count fingers at arm's length
declines

## 2019-12-12 NOTE — ASU DISCHARGE PLAN (ADULT/PEDIATRIC) - CARE PROVIDER_API CALL
Eleazar Quevedo)  Orthopaedic Surgery  [address], [address]  [place] [address]  Phone: [phone]  Fax: [phone]  Follow Up Time:

## 2019-12-12 NOTE — ASU PATIENT PROFILE, ADULT - PMH
Cervical disc displacement    Cervical radiculopathy    Chronic pain  had steroid injections 1 month ago  Gall stones  s/p cholecystectomy  Globus sensation  c/o dysphagia - s/p laryngoscopy 12/2018 Dr Jackson - wn  Hypertension    Menorrhagia with irregular cycle  Mirena IUD placed  Migraines    Shoulder impingement, right    Shoulder injury  per pt/family - while in Rehab after 10/2017 C6-C7 ACDF, television fell from mount on wall and struck her in head/right shoulder  Spinal stenosis of lumbosacral region    Spondylolisthesis    Thyroid nodule  s/p biopsy 2/2019 - nontoxic

## 2019-12-12 NOTE — ASU PATIENT PROFILE, ADULT - PSH
Encounter for insertion of mirena IUD  2017  H/O tubal ligation  2008 [facility]  History of arthroscopy of right knee  9/2018  S/P cervical spinal fusion  10/2017 C6-C7 ACDF  S/P laparoscopic cholecystectomy    S/P left oophorectomy  1/2018 - benign, per pt/family

## 2019-12-12 NOTE — BRIEF OPERATIVE NOTE - NSICDXBRIEFPROCEDURE_GEN_ALL_CORE_FT
PROCEDURES:  Labral repair of hip 12-Dec-2019 11:34:37  Kalin Lr  Hip arthroscopy 12-Dec-2019 11:34:31  Kalin Lr

## 2019-12-16 DIAGNOSIS — M24.151 OTHER ARTICULAR CARTILAGE DISORDERS, RIGHT HIP: ICD-10-CM

## 2019-12-16 DIAGNOSIS — Z79.84 LONG TERM (CURRENT) USE OF ORAL HYPOGLYCEMIC DRUGS: ICD-10-CM

## 2019-12-16 DIAGNOSIS — E78.5 HYPERLIPIDEMIA, UNSPECIFIED: ICD-10-CM

## 2019-12-16 DIAGNOSIS — E11.9 TYPE 2 DIABETES MELLITUS WITHOUT COMPLICATIONS: ICD-10-CM

## 2019-12-16 DIAGNOSIS — I10 ESSENTIAL (PRIMARY) HYPERTENSION: ICD-10-CM

## 2019-12-18 NOTE — ASU PATIENT PROFILE, ADULT - AS SC BRADEN MOBILITY
[de-identified] : he states she is feeling well he has finished his antibiotics he has some mild residual cough. He has been back to work [FreeTextEntry1] : This is a 68-year-old male who presents for followup after an episode of community-acquired pneumonia\par \par His chest x-ray showed a lingular infiltrate consistent with pneumonitis\par \par On examination that time he was lethargic and suffering from myalgias cough and weakness he was started on Augmentin and Zithromax (4) no limitation

## 2019-12-24 ENCOUNTER — APPOINTMENT (OUTPATIENT)
Dept: ORTHOPEDIC SURGERY | Facility: CLINIC | Age: 49
End: 2019-12-24
Payer: COMMERCIAL

## 2019-12-24 PROCEDURE — 99024 POSTOP FOLLOW-UP VISIT: CPT

## 2019-12-24 NOTE — HISTORY OF PRESENT ILLNESS
[de-identified] : R hip [de-identified] : 49 yo F s/p Right hip arthroscopy, labral repair, 12/12/19. She is doing well, not yet started PT.  c/o pain lateral hip.   Using crutches.  Denies numbness/tingling. [de-identified] : Right hip exam\par Incisions well-healed no erythema\par No pain with hip range of motion 0-90° no pain with logroll\par Able to flex and extend all toes\par sensation intact throughout\par bcr.\par  [de-identified] : 49 yo F s/p Right hip arthroscopy, labral repair, 12/12/19. [de-identified] : Sutures removed Steri-Strips placed. We reviewed postoperative protocol instructions with use of translation. Continue crutches partial weightbearing physical therapy followup one month. All questions answered

## 2020-01-08 ENCOUNTER — MOBILE ON CALL (OUTPATIENT)
Age: 50
End: 2020-01-08

## 2020-01-21 ENCOUNTER — APPOINTMENT (OUTPATIENT)
Dept: ORTHOPEDIC SURGERY | Facility: CLINIC | Age: 50
End: 2020-01-21
Payer: COMMERCIAL

## 2020-01-21 PROCEDURE — 99024 POSTOP FOLLOW-UP VISIT: CPT

## 2020-01-21 NOTE — HISTORY OF PRESENT ILLNESS
[de-identified] : R hip [de-identified] : 47 yo F s/p Right hip arthroscopy, labral repair, 12/12/19. She is doing well, working w PT.  c/o pain lateral hip.   Using crutches.  Denies numbness/tingling. [de-identified] : Right hip exam\par Incisions well-healed no erythema\par No pain with hip range of motion 0-90° no pain with logroll\par Able to flex and extend all toes\par sensation intact throughout\par bcr.\par  [de-identified] : 47 yo F s/p Right hip arthroscopy, labral repair, 12/12/19. [de-identified] : We reviewed postoperative protocol instructions. Continue physical therapy transition to weightbearing as tolerated. Followup in 6 weeks

## 2020-02-19 ENCOUNTER — APPOINTMENT (OUTPATIENT)
Dept: ORTHOPEDIC SURGERY | Facility: CLINIC | Age: 50
End: 2020-02-19
Payer: COMMERCIAL

## 2020-02-19 DIAGNOSIS — Q76.2 CONGENITAL SPONDYLOLISTHESIS: ICD-10-CM

## 2020-02-19 PROCEDURE — 72100 X-RAY EXAM L-S SPINE 2/3 VWS: CPT

## 2020-02-19 PROCEDURE — 72040 X-RAY EXAM NECK SPINE 2-3 VW: CPT

## 2020-02-19 PROCEDURE — 99214 OFFICE O/P EST MOD 30 MIN: CPT

## 2020-02-19 NOTE — H&P PST ADULT - PMH
none Cervical disc displacement    Cervical radiculopathy    Gall stones    Hypertension    Menorrhagia with irregular cycle    Migraines    Shoulder impingement, right Cervical disc displacement    Cervical radiculopathy    Chronic pain  had steroid injections 1 month ago  Gall stones    Hypertension    Menorrhagia with irregular cycle    Migraines    Shoulder impingement, right

## 2020-02-20 ENCOUNTER — APPOINTMENT (OUTPATIENT)
Dept: ORTHOPEDIC SURGERY | Facility: CLINIC | Age: 50
End: 2020-02-20
Payer: COMMERCIAL

## 2020-02-20 DIAGNOSIS — M54.12 RADICULOPATHY, CERVICAL REGION: ICD-10-CM

## 2020-02-20 PROCEDURE — 99213 OFFICE O/P EST LOW 20 MIN: CPT | Mod: 24

## 2020-02-20 NOTE — DISCUSSION/SUMMARY
[de-identified] : 48yo F with right shoulder/arm pain and weakness.  MR arthrogram from 04/2019 reveals intact RTC, nondisplaced labral tear.  She saw a neurologist, Dr Akshat Frias, 10/4/18 who recommend an EMG at that time, which patient did not have done.  Ordered and recommend she have EMG done to r/o cervical radiculopathy vs brachial plexopathy.  Recommend pain management.  She may f/u after EMG.  All ques answered, she expresses understanding.

## 2020-02-20 NOTE — HISTORY OF PRESENT ILLNESS
[de-identified] : 49-year-old female presents complaining of right arm pain and weakness for several months. She has been seen previously for multiple other musculoskeletal complaints.  She is s/p ACDF C6C7 on 10/11/2017 and L5-S1 posterior lumbar fusion and laminectomy on 02/20/19, by Dr. Andino.  She continues to have pain throughout her posterior lateral shoulder that radiates down to her forearm. She is taking gabapentin.  She states she received a cortisone injection to her right shoulder in the past with no relief. She has been going to physical therapy with no relief. Increased pain with lifting arm overhead, behind back.

## 2020-02-20 NOTE — PHYSICAL EXAM
[de-identified] : Right shoulder exam\par \par Inspection: No swelling, ecchymosis or gross deformity.\par Skin: No masses, No lesions\par Tenderness: No bicipital tenderness, + tenderness to the greater tuberosity/RTC insertion, + anterior shoulder/lesser tuberosity tenderness. No tenderness SC joint, clavicle, AC joint.\par ROM: 145/60/T6\par Impingement tests: Positive Bolanos\par AC Joint: no pain with cross arm testing\par Biceps: Negative speed\par Strength: 4/5 abduction, external rotation, and internal rotation\par Neuro: AIN, PIN, Ulnar nerve motor intact\par Sensation: Intact to light touch in radial, median, ulnar, and axillary nerve distributions\par Vasc: 2+ radial pulse [de-identified] : Right shoulder MR arthrogram from 04/2019; non displaced SLAP tear, intact RTC.

## 2020-03-10 ENCOUNTER — APPOINTMENT (OUTPATIENT)
Dept: ORTHOPEDIC SURGERY | Facility: CLINIC | Age: 50
End: 2020-03-10
Payer: COMMERCIAL

## 2020-03-10 DIAGNOSIS — S73.191A OTHER SPRAIN OF RIGHT HIP, INITIAL ENCOUNTER: ICD-10-CM

## 2020-03-10 PROCEDURE — 99024 POSTOP FOLLOW-UP VISIT: CPT

## 2020-03-10 NOTE — HISTORY OF PRESENT ILLNESS
[de-identified] : R hip [de-identified] : 49 yo F s/p Right hip arthroscopy, labral repair, 12/12/19. She is doing well, working w PT.  c/o pain lateral hip.     Denies numbness/tingling. [de-identified] : Right hip exam\par Incisions well-healed no erythema\par No pain with hip range of motion 0-90° no pain with logroll\par Able to flex and extend all toes\par sensation intact throughout\par bcr.\par  [de-identified] : 47 yo F s/p Right hip arthroscopy, labral repair, 12/12/19. [de-identified] : We reviewed postoperative protocol instructions. Continue physical therapy. And range of motion strengthening. She will followup in 3 months

## 2020-03-11 NOTE — PRE-OP CHECKLIST - NOTHING BY MOUTH SINCE
Subjective:      Kareem Fuller is a 10 m.o. male here with mother. Patient brought in for fever and cough    History of Present Illness:  HPI  He has had fever x 3 days, as high as 101.  He has some  Cough.  Appetite is less  Not sleeping as well as usual      Review of Systems   Constitutional: Positive for fever. Negative for appetite change and crying.   HENT: Negative for congestion.    Eyes: Negative for discharge.   Respiratory: Positive for cough.    Gastrointestinal: Negative for abdominal distention, constipation, diarrhea and vomiting.   Genitourinary: Negative for hematuria.   Skin: Negative for rash.       Objective:     Physical Exam   Constitutional: He is active.   HENT:   Head: Anterior fontanelle is flat. No cranial deformity or facial anomaly.   Mouth/Throat: Mucous membranes are moist.   Cardiovascular: Normal rate, regular rhythm, S1 normal and S2 normal.   No murmur heard.  Pulmonary/Chest: Effort normal. No respiratory distress.   Abdominal: Soft. He exhibits no distension. There is no hepatosplenomegaly. There is no tenderness. There is no rebound.   Neurological: He is alert.   Skin: Turgor is normal.       Assessment:        1. Viral syndrome         Plan:         Patient Instructions   Extra fluids  Rest           10-Oct-2017 23:00

## 2020-04-01 ENCOUNTER — APPOINTMENT (OUTPATIENT)
Dept: OTOLARYNGOLOGY | Facility: CLINIC | Age: 50
End: 2020-04-01

## 2020-05-04 ENCOUNTER — APPOINTMENT (OUTPATIENT)
Dept: NEUROLOGY | Facility: CLINIC | Age: 50
End: 2020-05-04
Payer: COMMERCIAL

## 2020-05-04 PROCEDURE — 95910 NRV CNDJ TEST 7-8 STUDIES: CPT

## 2020-05-04 PROCEDURE — 95886 MUSC TEST DONE W/N TEST COMP: CPT

## 2020-05-27 ENCOUNTER — APPOINTMENT (OUTPATIENT)
Dept: OTOLARYNGOLOGY | Facility: CLINIC | Age: 50
End: 2020-05-27
Payer: COMMERCIAL

## 2020-05-27 VITALS
BODY MASS INDEX: 36.32 KG/M2 | WEIGHT: 205 LBS | HEART RATE: 62 BPM | SYSTOLIC BLOOD PRESSURE: 126 MMHG | HEIGHT: 63 IN | DIASTOLIC BLOOD PRESSURE: 81 MMHG | TEMPERATURE: 97.7 F

## 2020-05-27 DIAGNOSIS — R07.0 PAIN IN THROAT: ICD-10-CM

## 2020-05-27 DIAGNOSIS — R09.89 OTHER SPECIFIED SYMPTOMS AND SIGNS INVOLVING THE CIRCULATORY AND RESPIRATORY SYSTEMS: ICD-10-CM

## 2020-05-27 DIAGNOSIS — K21.9 GASTRO-ESOPHAGEAL REFLUX DISEASE W/OUT ESOPHAGITIS: ICD-10-CM

## 2020-05-27 DIAGNOSIS — M54.2 CERVICALGIA: ICD-10-CM

## 2020-05-27 PROCEDURE — 99214 OFFICE O/P EST MOD 30 MIN: CPT | Mod: 25

## 2020-05-27 PROCEDURE — 31575 DIAGNOSTIC LARYNGOSCOPY: CPT

## 2020-05-27 NOTE — PROCEDURE
[de-identified] : Flexible scope #2 used. Passed through nasal passage and nasopharynx/oropharynx/hypopharynx clear. Supraglottis normal. Glottis with fully mobile vocal cords without lesions or masses. Visualized subglottis clear. Postcricoid area with mild erythema but no edema. No pooling of secretions.

## 2020-05-27 NOTE — PHYSICAL EXAM
[Midline] : trachea located in midline position [Normal] : no rashes [de-identified] : tenderness over right SCM and R SMG, No masses.

## 2020-05-27 NOTE — CONSULT LETTER
[Dear  ___] : Dear  [unfilled], [Consult Letter:] : I had the pleasure of evaluating your patient, [unfilled]. [Please see my note below.] : Please see my note below. [Consult Closing:] : Thank you very much for allowing me to participate in the care of this patient.  If you have any questions, please do not hesitate to contact me. [Sincerely,] : Sincerely, [FreeTextEntry3] : Elva Jackson MD\par Otolaryngology and Cranial Base Surgery\par Attending Physician - Department of Otolaryngology and Head & Neck Surgery\par [facility]\par \par [employer] [facility]

## 2020-05-27 NOTE — ASSESSMENT
[FreeTextEntry1] : Right anterior neck pain and odynophagia.  Hx of ACDF 2017\par - mild LPRD seen on FOE, no masses noted, reflux precautions\par - Will get US to r/o Lymphadenopathy Vs. Submandibular gland pathology; although suspect musculoskeletal pain\par - Will call with results. \par

## 2020-05-27 NOTE — HISTORY OF PRESENT ILLNESS
[de-identified] : 48 y/o F with throat pain and odynophagia (On right) globus sensation with intermittent raspy voice since ACDF C6-7 on 10/11/2017.  Worsening over the past 8 months.  Feels occasionally some swelling on right neck under jaw that comes and goes.  Some chills.  no fevers, no night sweats, no weight loss.  Notes no trouble swallowing.  No sensation of GERD.  Not taking any Reflux medication at this time. \par She was seen in December on 2018 - and noted to have some LPRD and started on Omeprazole 20 mg, notes symptoms were improving and then she stopped taking it. \par Also had CT at that time that was normal except for small thyroid nodule.  She notes has followed with Endocrinology for that.

## 2020-06-02 ENCOUNTER — APPOINTMENT (OUTPATIENT)
Dept: ULTRASOUND IMAGING | Facility: CLINIC | Age: 50
End: 2020-06-02
Payer: COMMERCIAL

## 2020-06-02 ENCOUNTER — OUTPATIENT (OUTPATIENT)
Dept: OUTPATIENT SERVICES | Facility: HOSPITAL | Age: 50
LOS: 1 days | End: 2020-06-02
Payer: COMMERCIAL

## 2020-06-02 DIAGNOSIS — Z98.890 OTHER SPECIFIED POSTPROCEDURAL STATES: Chronic | ICD-10-CM

## 2020-06-02 DIAGNOSIS — Z90.721 ACQUIRED ABSENCE OF OVARIES, UNILATERAL: Chronic | ICD-10-CM

## 2020-06-02 DIAGNOSIS — Z98.51 TUBAL LIGATION STATUS: Chronic | ICD-10-CM

## 2020-06-02 DIAGNOSIS — Z90.49 ACQUIRED ABSENCE OF OTHER SPECIFIED PARTS OF DIGESTIVE TRACT: Chronic | ICD-10-CM

## 2020-06-02 DIAGNOSIS — Z98.1 ARTHRODESIS STATUS: Chronic | ICD-10-CM

## 2020-06-02 DIAGNOSIS — M54.2 CERVICALGIA: ICD-10-CM

## 2020-06-02 DIAGNOSIS — Z30.430 ENCOUNTER FOR INSERTION OF INTRAUTERINE CONTRACEPTIVE DEVICE: Chronic | ICD-10-CM

## 2020-06-02 PROCEDURE — 76536 US EXAM OF HEAD AND NECK: CPT | Mod: 26

## 2020-06-02 PROCEDURE — 76536 US EXAM OF HEAD AND NECK: CPT

## 2020-06-04 ENCOUNTER — APPOINTMENT (OUTPATIENT)
Dept: ORTHOPEDIC SURGERY | Facility: CLINIC | Age: 50
End: 2020-06-04
Payer: COMMERCIAL

## 2020-06-04 VITALS — TEMPERATURE: 97.9 F

## 2020-06-04 DIAGNOSIS — Q76.2 CONGENITAL SPONDYLOLISTHESIS: ICD-10-CM

## 2020-06-04 DIAGNOSIS — G95.20 UNSPECIFIED CORD COMPRESSION: ICD-10-CM

## 2020-06-04 PROCEDURE — 99214 OFFICE O/P EST MOD 30 MIN: CPT

## 2020-06-09 ENCOUNTER — APPOINTMENT (OUTPATIENT)
Dept: ORTHOPEDIC SURGERY | Facility: CLINIC | Age: 50
End: 2020-06-09

## 2020-06-12 ENCOUNTER — APPOINTMENT (OUTPATIENT)
Dept: ORTHOPEDIC SURGERY | Facility: CLINIC | Age: 50
End: 2020-06-12
Payer: COMMERCIAL

## 2020-06-12 DIAGNOSIS — M75.41 IMPINGEMENT SYNDROME OF RIGHT SHOULDER: ICD-10-CM

## 2020-06-12 DIAGNOSIS — G56.80 OTHER SPECIFIED MONONEUROPATHIES OF UNSPECIFIED UPPER LIMB: ICD-10-CM

## 2020-06-12 PROCEDURE — 99213 OFFICE O/P EST LOW 20 MIN: CPT

## 2020-06-12 NOTE — PHYSICAL EXAM
[de-identified] : General Exam\par \par Well developed, well nourished\par No apparent distress\par Oriented to person, place, and time\par Mood: Normal\par Affect: Normal\par Balance and coordination: Normal\par Gait: Normal\par \par Right shoulder exam\par \par Inspection: No swelling, ecchymosis or gross deformity.\par Skin: No masses, No lesions\par Tenderness: + ttp every location palpated.  biceps.  gt/lt./  deltoud.  clavicle.  trap, med scapula\par ROM: 160/60/T6\par Impingement tests: Positive Bolanos\par AC Joint: no pain with cross arm testing\par Biceps: Negative speed\par Strength: 5/5 abduction, external rotation, and internal rotation\par Neuro: AIN, PIN, Ulnar nerve motor intact\par Sensation: Intact to light touch in radial, median, ulnar, and axillary nerve distributions\par Vasc: 2+ radial pulse\par

## 2020-06-12 NOTE — DISCUSSION/SUMMARY
[de-identified] : 49-year-old female with myofascial pain and hypersensitivity she has tenderness at every single location that I pressed she has a normal MRI without structural damage. We again discussed at length that her pain is not orthopedic in nature there is no structural damage within her shoulder. Recommend pain management evaluation she will follow up as needed

## 2020-06-12 NOTE — HISTORY OF PRESENT ILLNESS
[de-identified] : \par 49-year-old female with history of cervical spine surgery. She continues to complain of neck and right shoulder pain. She complains of hypersensitivity to touch. She complains of pain on the posterior aspect of her thoracic spine scapula. She had MR arthrogram last year that were structurally normal\par \par The patient's past medical history, past surgical history, medications, allergies, and social history were reviewed by me today with the patient and documented accordingly. In addition, the patient's family history, which is noncontributory to this visit, was also reviewed.\par

## 2020-06-23 ENCOUNTER — APPOINTMENT (OUTPATIENT)
Dept: ORTHOPEDIC SURGERY | Facility: CLINIC | Age: 50
End: 2020-06-23

## 2020-07-14 NOTE — ASU PATIENT PROFILE, ADULT - NS TRANSFER DENTURES
Ortho / Neurosurgery NP Note    POD# 1  s/p RIGHT KNEE ARTHROSCOPY, PARTIAL MEDIAL MENISCECTOMY     Pt resting in bed. Assisted to bsc, tolerated well. Motivated to increase mobility   Pain 3/10, patient states it is tolerable. Discussed pain management with prn oxycodone 5-10 mg q 4 hours. Tolerating regular diet. VSS Afebrile. Denies SOB, dizziness. Visit Vitals  /75   Pulse 80   Temp 98.4 °F (36.9 °C)   Resp 16   Ht 5' 8\" (1.727 m)   Wt 127.2 kg (280 lb 6.8 oz)   SpO2 96%   Breastfeeding No   BMI 42.64 kg/m²       Voiding status: voiding  Output (mL)  Last Bowel Movement Date: 07/13/20 (07/14/20 0034)  Unmeasurable Output  Urine Occurrence(s): 1 (07/14/20 0034)      Labs    Lab Results   Component Value Date/Time    HGB 13.2 07/02/2020 03:47 PM      Lab Results   Component Value Date/Time    INR 1.1 04/21/2009 07:40 PM      Lab Results   Component Value Date/Time    Sodium 140 07/02/2020 03:47 PM    Potassium 3.5 07/02/2020 03:47 PM    Chloride 107 07/02/2020 03:47 PM    CO2 27 07/02/2020 03:47 PM    Glucose 111 (H) 07/02/2020 03:47 PM    BUN 6 07/02/2020 03:47 PM    Creatinine 0.81 07/02/2020 03:47 PM    Calcium 9.3 07/02/2020 03:47 PM     Recent Glucose Results: No results found for: GLU, GLUPOC, GLUCPOC        Body mass index is 42.64 kg/m². : A BMI > 30 is classified as obesity and > 40 is classified as morbid obesity. Gauze and ace wrap removed. Sutures c.d.i, well approximated. No drainage or erythema. Cryotherapy in place over incision  Calves soft and supple; No pain with passive stretch  Sensation and motor intact - PF/DF/EHL intact 5/5  SCDs for mechanical DVT proph while in bed     PLAN:  1) PT BID  2) Pain control- increased prn oxycodone 5-10 mg q4h. Tylenol prn added.    3) Readniess for discharge:     [x] Vital Signs stable    [x] + Voiding    [x] Wound intact, drainage minimal    [x] Tolerating PO intake     [] Cleared by PT (OT if applicable)     [] Stair training completed (if applicable)    [] Independent / Contact Guard Assist (household distance)     [] Bed mobility     [] Car transfers     [] ADLs    [x] Adequate pain control on oral medication alone     Discharge home today after PT. Rx sent to patient's pharmacy.      Drena Cabot, NP Full

## 2020-08-03 NOTE — ED ADULT NURSE REASSESSMENT NOTE - GENITOURINARY ASSESSMENT
[FreeTextEntry1] : 24 year old Christian female, presents for evaluation and physical exam, s/p telehealth appointment; she has multiple b/l breast nodules and has a h/o left breast excisional biopsy x 2 on 9/27/19 for left breast mass 12:00 and 2:00. 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 DARI without mammographic density 14.5%. She denies any palpable abnormalities, no skin changes, no nipple discharge bilaterally. \par \par Discussed that recommendations for breast cancer surveillance in an average risk woman should be annual mammograms, with supplemental ultrasound for dense breasts and start at the age of 40 years old. Patient expressed understanding\par \par \par  WDL

## 2020-10-14 NOTE — H&P PST ADULT - NSSUBSTANCEUSE_GEN_ALL_CORE_SD
Pt in bed asleep. No distress is noted. Sitter is at the bedside.      Maylin Mcintosh RN  10/14/20 0265 never used

## 2020-11-03 NOTE — PATIENT PROFILE ADULT. - NS PRO OT REFERRAL QUES 2 YN
Problem: Communication  Goal: The ability to communicate needs accurately and effectively will improve  Outcome: PROGRESSING AS EXPECTED     Problem: Safety  Goal: Will remain free from injury  Outcome: PROGRESSING AS EXPECTED      no

## 2021-06-07 ENCOUNTER — EMERGENCY (EMERGENCY)
Facility: HOSPITAL | Age: 51
LOS: 0 days | Discharge: HOME | End: 2021-06-07
Attending: EMERGENCY MEDICINE
Payer: MEDICAID

## 2021-06-07 VITALS
WEIGHT: 190.04 LBS | SYSTOLIC BLOOD PRESSURE: 156 MMHG | HEIGHT: 63 IN | DIASTOLIC BLOOD PRESSURE: 86 MMHG | OXYGEN SATURATION: 97 % | TEMPERATURE: 98 F | RESPIRATION RATE: 18 BRPM | HEART RATE: 72 BPM

## 2021-06-07 VITALS
RESPIRATION RATE: 18 BRPM | OXYGEN SATURATION: 95 % | HEART RATE: 51 BPM | TEMPERATURE: 98 F | SYSTOLIC BLOOD PRESSURE: 147 MMHG | DIASTOLIC BLOOD PRESSURE: 76 MMHG

## 2021-06-07 DIAGNOSIS — Z98.890 OTHER SPECIFIED POSTPROCEDURAL STATES: Chronic | ICD-10-CM

## 2021-06-07 DIAGNOSIS — R10.13 EPIGASTRIC PAIN: ICD-10-CM

## 2021-06-07 DIAGNOSIS — G43.909 MIGRAINE, UNSPECIFIED, NOT INTRACTABLE, WITHOUT STATUS MIGRAINOSUS: ICD-10-CM

## 2021-06-07 DIAGNOSIS — I10 ESSENTIAL (PRIMARY) HYPERTENSION: ICD-10-CM

## 2021-06-07 DIAGNOSIS — Z30.430 ENCOUNTER FOR INSERTION OF INTRAUTERINE CONTRACEPTIVE DEVICE: Chronic | ICD-10-CM

## 2021-06-07 DIAGNOSIS — Z79.82 LONG TERM (CURRENT) USE OF ASPIRIN: ICD-10-CM

## 2021-06-07 DIAGNOSIS — Z90.49 ACQUIRED ABSENCE OF OTHER SPECIFIED PARTS OF DIGESTIVE TRACT: Chronic | ICD-10-CM

## 2021-06-07 DIAGNOSIS — M48.07 SPINAL STENOSIS, LUMBOSACRAL REGION: ICD-10-CM

## 2021-06-07 DIAGNOSIS — Z98.51 TUBAL LIGATION STATUS: Chronic | ICD-10-CM

## 2021-06-07 DIAGNOSIS — Z79.84 LONG TERM (CURRENT) USE OF ORAL HYPOGLYCEMIC DRUGS: ICD-10-CM

## 2021-06-07 DIAGNOSIS — R11.10 VOMITING, UNSPECIFIED: ICD-10-CM

## 2021-06-07 DIAGNOSIS — Z98.51 TUBAL LIGATION STATUS: ICD-10-CM

## 2021-06-07 DIAGNOSIS — Z91.018 ALLERGY TO OTHER FOODS: ICD-10-CM

## 2021-06-07 DIAGNOSIS — Z90.49 ACQUIRED ABSENCE OF OTHER SPECIFIED PARTS OF DIGESTIVE TRACT: ICD-10-CM

## 2021-06-07 DIAGNOSIS — Z98.1 ARTHRODESIS STATUS: Chronic | ICD-10-CM

## 2021-06-07 DIAGNOSIS — Z90.721 ACQUIRED ABSENCE OF OVARIES, UNILATERAL: Chronic | ICD-10-CM

## 2021-06-07 LAB
ALBUMIN SERPL ELPH-MCNC: 3.7 G/DL — SIGNIFICANT CHANGE UP (ref 3.3–5)
ALP SERPL-CCNC: 107 U/L — SIGNIFICANT CHANGE UP (ref 40–120)
ALT FLD-CCNC: 30 U/L — SIGNIFICANT CHANGE UP (ref 12–78)
ANION GAP SERPL CALC-SCNC: 6 MMOL/L — SIGNIFICANT CHANGE UP (ref 5–17)
APPEARANCE UR: CLEAR — SIGNIFICANT CHANGE UP
AST SERPL-CCNC: 16 U/L — SIGNIFICANT CHANGE UP (ref 15–37)
BASOPHILS # BLD AUTO: 0.04 K/UL — SIGNIFICANT CHANGE UP (ref 0–0.2)
BASOPHILS NFR BLD AUTO: 0.5 % — SIGNIFICANT CHANGE UP (ref 0–2)
BILIRUB SERPL-MCNC: 0.7 MG/DL — SIGNIFICANT CHANGE UP (ref 0.2–1.2)
BILIRUB UR-MCNC: NEGATIVE — SIGNIFICANT CHANGE UP
BUN SERPL-MCNC: 12 MG/DL — SIGNIFICANT CHANGE UP (ref 7–23)
CALCIUM SERPL-MCNC: 8.8 MG/DL — SIGNIFICANT CHANGE UP (ref 8.5–10.1)
CHLORIDE SERPL-SCNC: 106 MMOL/L — SIGNIFICANT CHANGE UP (ref 96–108)
CO2 SERPL-SCNC: 29 MMOL/L — SIGNIFICANT CHANGE UP (ref 22–31)
COLOR SPEC: YELLOW — SIGNIFICANT CHANGE UP
CREAT SERPL-MCNC: 0.75 MG/DL — SIGNIFICANT CHANGE UP (ref 0.5–1.3)
DIFF PNL FLD: NEGATIVE — SIGNIFICANT CHANGE UP
EOSINOPHIL # BLD AUTO: 0.17 K/UL — SIGNIFICANT CHANGE UP (ref 0–0.5)
EOSINOPHIL NFR BLD AUTO: 2.3 % — SIGNIFICANT CHANGE UP (ref 0–6)
GLUCOSE SERPL-MCNC: 148 MG/DL — HIGH (ref 70–99)
GLUCOSE UR QL: NEGATIVE MG/DL — SIGNIFICANT CHANGE UP
HCT VFR BLD CALC: 37.1 % — SIGNIFICANT CHANGE UP (ref 34.5–45)
HGB BLD-MCNC: 12.1 G/DL — SIGNIFICANT CHANGE UP (ref 11.5–15.5)
IMM GRANULOCYTES NFR BLD AUTO: 0.3 % — SIGNIFICANT CHANGE UP (ref 0–1.5)
KETONES UR-MCNC: NEGATIVE — SIGNIFICANT CHANGE UP
LEUKOCYTE ESTERASE UR-ACNC: NEGATIVE — SIGNIFICANT CHANGE UP
LIDOCAIN IGE QN: 87 U/L — SIGNIFICANT CHANGE UP (ref 73–393)
LYMPHOCYTES # BLD AUTO: 2.6 K/UL — SIGNIFICANT CHANGE UP (ref 1–3.3)
LYMPHOCYTES # BLD AUTO: 34.9 % — SIGNIFICANT CHANGE UP (ref 13–44)
MCHC RBC-ENTMCNC: 28.3 PG — SIGNIFICANT CHANGE UP (ref 27–34)
MCHC RBC-ENTMCNC: 32.6 GM/DL — SIGNIFICANT CHANGE UP (ref 32–36)
MCV RBC AUTO: 86.9 FL — SIGNIFICANT CHANGE UP (ref 80–100)
MONOCYTES # BLD AUTO: 0.43 K/UL — SIGNIFICANT CHANGE UP (ref 0–0.9)
MONOCYTES NFR BLD AUTO: 5.8 % — SIGNIFICANT CHANGE UP (ref 2–14)
NEUTROPHILS # BLD AUTO: 4.19 K/UL — SIGNIFICANT CHANGE UP (ref 1.8–7.4)
NEUTROPHILS NFR BLD AUTO: 56.2 % — SIGNIFICANT CHANGE UP (ref 43–77)
NITRITE UR-MCNC: NEGATIVE — SIGNIFICANT CHANGE UP
PH UR: 6.5 — SIGNIFICANT CHANGE UP (ref 5–8)
PLATELET # BLD AUTO: 304 K/UL — SIGNIFICANT CHANGE UP (ref 150–400)
POTASSIUM SERPL-MCNC: 3.3 MMOL/L — LOW (ref 3.5–5.3)
POTASSIUM SERPL-SCNC: 3.3 MMOL/L — LOW (ref 3.5–5.3)
PROT SERPL-MCNC: 7 GM/DL — SIGNIFICANT CHANGE UP (ref 6–8.3)
PROT UR-MCNC: NEGATIVE MG/DL — SIGNIFICANT CHANGE UP
RBC # BLD: 4.27 M/UL — SIGNIFICANT CHANGE UP (ref 3.8–5.2)
RBC # FLD: 13.4 % — SIGNIFICANT CHANGE UP (ref 10.3–14.5)
SODIUM SERPL-SCNC: 141 MMOL/L — SIGNIFICANT CHANGE UP (ref 135–145)
SP GR SPEC: 1.01 — SIGNIFICANT CHANGE UP (ref 1.01–1.02)
UROBILINOGEN FLD QL: NEGATIVE MG/DL — SIGNIFICANT CHANGE UP
WBC # BLD: 7.45 K/UL — SIGNIFICANT CHANGE UP (ref 3.8–10.5)
WBC # FLD AUTO: 7.45 K/UL — SIGNIFICANT CHANGE UP (ref 3.8–10.5)

## 2021-06-07 PROCEDURE — 36415 COLL VENOUS BLD VENIPUNCTURE: CPT

## 2021-06-07 PROCEDURE — 85025 COMPLETE CBC W/AUTO DIFF WBC: CPT

## 2021-06-07 PROCEDURE — 81003 URINALYSIS AUTO W/O SCOPE: CPT

## 2021-06-07 PROCEDURE — 74177 CT ABD & PELVIS W/CONTRAST: CPT | Mod: 26,MA

## 2021-06-07 PROCEDURE — 83690 ASSAY OF LIPASE: CPT

## 2021-06-07 PROCEDURE — 96361 HYDRATE IV INFUSION ADD-ON: CPT

## 2021-06-07 PROCEDURE — 99284 EMERGENCY DEPT VISIT MOD MDM: CPT | Mod: 25

## 2021-06-07 PROCEDURE — 93005 ELECTROCARDIOGRAM TRACING: CPT

## 2021-06-07 PROCEDURE — 93010 ELECTROCARDIOGRAM REPORT: CPT

## 2021-06-07 PROCEDURE — 96374 THER/PROPH/DIAG INJ IV PUSH: CPT | Mod: XU

## 2021-06-07 PROCEDURE — 96375 TX/PRO/DX INJ NEW DRUG ADDON: CPT

## 2021-06-07 PROCEDURE — 80053 COMPREHEN METABOLIC PANEL: CPT

## 2021-06-07 PROCEDURE — 87086 URINE CULTURE/COLONY COUNT: CPT

## 2021-06-07 PROCEDURE — 74177 CT ABD & PELVIS W/CONTRAST: CPT

## 2021-06-07 PROCEDURE — 99285 EMERGENCY DEPT VISIT HI MDM: CPT

## 2021-06-07 RX ORDER — ONDANSETRON 8 MG/1
4 TABLET, FILM COATED ORAL ONCE
Refills: 0 | Status: COMPLETED | OUTPATIENT
Start: 2021-06-07 | End: 2021-06-07

## 2021-06-07 RX ORDER — POTASSIUM CHLORIDE 20 MEQ
20 PACKET (EA) ORAL
Refills: 0 | Status: DISCONTINUED | OUTPATIENT
Start: 2021-06-07 | End: 2021-06-07

## 2021-06-07 RX ORDER — MORPHINE SULFATE 50 MG/1
4 CAPSULE, EXTENDED RELEASE ORAL ONCE
Refills: 0 | Status: DISCONTINUED | OUTPATIENT
Start: 2021-06-07 | End: 2021-06-07

## 2021-06-07 RX ORDER — FAMOTIDINE 10 MG/ML
20 INJECTION INTRAVENOUS ONCE
Refills: 0 | Status: COMPLETED | OUTPATIENT
Start: 2021-06-07 | End: 2021-06-07

## 2021-06-07 RX ORDER — ONDANSETRON 8 MG/1
1 TABLET, FILM COATED ORAL
Qty: 15 | Refills: 0
Start: 2021-06-07

## 2021-06-07 RX ORDER — LIDOCAINE 4 G/100G
5 CREAM TOPICAL ONCE
Refills: 0 | Status: COMPLETED | OUTPATIENT
Start: 2021-06-07 | End: 2021-06-07

## 2021-06-07 RX ORDER — SODIUM CHLORIDE 9 MG/ML
1000 INJECTION INTRAMUSCULAR; INTRAVENOUS; SUBCUTANEOUS ONCE
Refills: 0 | Status: COMPLETED | OUTPATIENT
Start: 2021-06-07 | End: 2021-06-07

## 2021-06-07 RX ADMIN — Medication 30 MILLILITER(S): at 09:49

## 2021-06-07 RX ADMIN — MORPHINE SULFATE 4 MILLIGRAM(S): 50 CAPSULE, EXTENDED RELEASE ORAL at 09:10

## 2021-06-07 RX ADMIN — SODIUM CHLORIDE 1000 MILLILITER(S): 9 INJECTION INTRAMUSCULAR; INTRAVENOUS; SUBCUTANEOUS at 11:26

## 2021-06-07 RX ADMIN — MORPHINE SULFATE 4 MILLIGRAM(S): 50 CAPSULE, EXTENDED RELEASE ORAL at 11:26

## 2021-06-07 RX ADMIN — LIDOCAINE 5 MILLILITER(S): 4 CREAM TOPICAL at 09:49

## 2021-06-07 RX ADMIN — FAMOTIDINE 20 MILLIGRAM(S): 10 INJECTION INTRAVENOUS at 09:49

## 2021-06-07 RX ADMIN — Medication 20 MILLIEQUIVALENT(S): at 12:37

## 2021-06-07 RX ADMIN — ONDANSETRON 4 MILLIGRAM(S): 8 TABLET, FILM COATED ORAL at 09:10

## 2021-06-07 RX ADMIN — SODIUM CHLORIDE 2000 MILLILITER(S): 9 INJECTION INTRAMUSCULAR; INTRAVENOUS; SUBCUTANEOUS at 09:10

## 2021-06-07 NOTE — ED PROVIDER NOTE - NSFOLLOWUPINSTRUCTIONS_ED_ALL_ED_FT
Please call and follow up with your doctor in 1-3 days.  Please call [phone] to find doctors in [facility] gastroenterology.      Dolor abdominal bailey    LO QUE NECESITA SABER:    ¿Qué necesito saber del dolor abdominal bailey?Generalmente, el dolor abdominal bailey comienza repentinamente y empeora rápidamente.    ¿Cuáles son las causas menores del dolor abdominal bailey?  •Laura reacción alérgica a alimentos o intoxicación por alimentos      •Estrés      •Reflujo gástrico      •Estreñimiento      •Dolor del periodo menstrual en las mujeres      ¿Cuáles son las causas graves del dolor abdominal bailey?  •Inflamación o ruptura de yuen apéndice      •Inflamación o infección en el abdomen o un órgano      •Laura obstrucción en los intestinos      •Laura úlcera o un desgarre en el esófago, el estómago o los intestinos      •Sangrado en el abdomen o un órgano      •Cálculos en el riñón o la vesícula biliar      •Enfermedades de las trompas de [place] o los ovarios      •Un embarazo ectópico      ¿Cómo se diagnostica la causa del dolor abdominal bailey?Yuen médico le preguntará acerca de shanna signos y síntomas. Informe al médico cuándo comenzaron shanna síntomas y sobre cualquier viaje o cirugía recientes. También infórmele qué hace que el dolor mejore o empeore, y qué tratamientos serna probado. El médico lo examinará. Con base en shanna síntomas y en lo que encuentre el médico en el examen, es posible que deba realizarse otros exámenes. Los ejemplos incluyen exámenes de deuce o de orina, ecografías, tomografías computarizadas o endoscopías.    ¿Cómo se trata el dolor abdominal bailey?El tratamiento podría depender de la causa del dolor abdominal. Es posible que usted necesite alguno de los siguientes:   •Los medicamentosestos pueden administrarse para disminuir el dolor, tratar laura infección y manejar shanna síntomas, tales bushra el estreñimiento.      •La cirugíapuede necesitarse para tratar causas graves del dolor abdominal. Los ejemplos incluyen cirugías para tratar laura apendicitis o laura obstrucción en los intestinos.      ¿Cómo puedo controlar los síntomas?  •Aplique calorsobre el abdomen de 20 a 30 minutos cada 2 horas por los días que le indiquen. El calor ayuda a disminuir el dolor y los espasmos musculares.      •Realice cambios en los alimentos que consume según se le indique.No coma alimentos que causan dolor abdominal u otros síntomas. Ingiera comidas pequeñas, más a menudo. ?Coma más alimentos ricos en fibra si tiene estreñimiento. Los alimentos altos en fibra incluyen frutas, verduras, alimentos de grano integral y legumbres.      ?No coma alimentos que causan gas si tiene distensión. Por ejemplo, brócoli, col y coliflor. No alexi gaseosas o bebidas carbonadas, ya que también pueden provocarle gases.      ?No consuma alimentos o bebidas que contienen sorbitol o fructosa si tiene diarrea y distensión. Algunos ejemplos son jugos de frutas, dulces, mermeladas y gomas de mascar sin azúcar.      ?No coma alimentos altos en grasas, bushra comidas fritas, hamburguesas con queso, salchichas y postres.      ?Limite o no tome cafeína. La cafeína puede empeorar los síntomas, bushra la acidez o las náuseas.      ?Alexi suficientes líquidos para evitar la deshidratación causada por la diarrea o los vómitos. Pregunte a yuen médico sobre la cantidad de líquido que necesita kwan todos los caty y cuáles le recomienda.      •Controle yuen estrés.El estrés puede causar dolor abdominal. Yuen médico puede recomendarle técnicas de relajación y ejercicios de respiración profunda para ayudar a disminuir el estrés. Yuen médico puede recomendarle que hable con alguien sobre yuen estrés o ansiedad, bushra un consejero o un amigo de confianza. Duerma lo suficiente y realice ejercicio regularmente.      •Limite o no consuma bebidas alcohólicas.El alcohol puede empeorar el dolor abdominal. Pregunte a yuen médico si usted puede kwan alcohol. Pregunte cuanto es la cantidad thompson para usted kwan.      •No fume.La nicotina y otros químicos en los cigarrillos pueden dañarle el esófago y el estómago. Pida información a yuen médico si usted actualmente fuma y necesita ayuda para dejar de fumar. Los cigarrillos electrónicos o el tabaco sin humo igualmente contienen nicotina. Consulte con yuen médico antes de utilizar estos productos.      ¿Cuándo mary buscar atención inmediata?  •Usted no puede dejar de vomitar o vomita deuce.      •Usted tiene deuce en las evacuaciones intestinales o estas tienen un aspecto alquitranado.      •Usted tiene sangrado por yuen recto.      •El tamaño del abdomen es más pablo de lo normal y se siente maribeth y más doloroso.      •Tiene dolor abdominal intenso.      •Usted monae de tener flatulencias y evacuaciones intestinales.      •Usted se siente mareado, débil o tiene sensación de desmayo.      ¿Cuándo mary comunicarme con mi médico?  •Tiene fiebre.      •Tiene nuevos signos y síntomas.      •Shanna síntomas no mejoran con el tratamiento.      •Usted tiene preguntas o inquietudes acerca de yuen condición o cuidado.      ACUERDOS SOBRE YUEN CUIDADO:    Usted tiene el derecho de ayudar a planear yuen cuidado. Aprenda todo lo que pueda sobre yuen condición y bushra darle tratamiento. Discuta shanna opciones de tratamiento con shanna médicos para decidir el cuidado que usted desea recibir. Usted siempre tiene el derecho de rechazar el tratamiento.

## 2021-06-07 NOTE — ED ADULT TRIAGE NOTE - CHIEF COMPLAINT QUOTE
Pt. to the ED C/O Epigastric Abdominal Pain- Pt. denies CP, SOB, Fevers Nausea and Vomiting- Hx. of Throat Ca ( Not on Chemo)

## 2021-06-07 NOTE — ED PROVIDER NOTE - OBJECTIVE STATEMENT
51 y/o F presents to ED c/o abd pain. Pt is a Khmer speaking F with a PMHx of cholecystitis and thyroid CA. Pt states onset of pain yesterday. Last night pt started vomiting but denies diarrhea. No travel, no sick contact, not a smoker, and no illegal drugs.  #[id number]

## 2021-06-07 NOTE — ED PROVIDER NOTE - PROGRESS NOTE DETAILS
Attending Finney, [id number] pt updated on results.  has follow up with PMD tomorrow.  pt on ppi.  pt to follow up with GI doctor sooner if possible. Attending Finney, [id number] pt updated on results.  Pt has follow up with PMD tomorrow.  pt on ppi.  Pt has apt with GI in july. Plan d/c and follow up with PMD and instructed pt to follow up with GI doctor sooner if possible.

## 2021-06-07 NOTE — ED PROVIDER NOTE - PSH
Encounter for insertion of mirena IUD  2017  H/O tubal ligation  2008 [facility]  History of arthroscopy of right knee  9/2018  S/P cervical spinal fusion  10/2017 C6-C7 ACDF  S/P laparoscopic cholecystectomy    S/P left oophorectomy  1/2018 - benign, per pt/family

## 2021-06-07 NOTE — ED ADULT NURSE NOTE - OBJECTIVE STATEMENT
Pt reports epigastric pain worseining over past three days. Pt Pt reports epigastric pain worseining over past three days. Pt reports nausea, but no vomiting. Pt reports pain sometimes worsens with po intake. Medicated for pain with morphine with little relief reported. Medicated with further medication as ordered and will monitor for relief.

## 2021-06-07 NOTE — ED PROVIDER NOTE - CONSTITUTIONAL, MLM
normal... Well appearing, awake, alert, oriented to person, place, time/situation and in no apparent distress. Well appearing, awake, alert, oriented to person, place, time/situation and in moderate distress due to pain

## 2021-06-07 NOTE — ED PROVIDER NOTE - PATIENT PORTAL LINK FT
You can access the FollowMyHealth Patient Portal offered by [facility] by registering at the following website: [email or web address]. By joining [employer]’s FollowMyHealth portal, you will also be able to view your health information using other applications (apps) compatible with our system.

## 2021-06-07 NOTE — ED ADULT NURSE NOTE - NSIMPLEMENTINTERV_GEN_ALL_ED
Implemented All Universal Safety Interventions:  Leroy to call system. Call bell, personal items and telephone within reach. Instruct patient to call for assistance. Room bathroom lighting operational. Non-slip footwear when patient is off stretcher. Physically safe environment: no spills, clutter or unnecessary equipment. Stretcher in lowest position, wheels locked, appropriate side rails in place.

## 2021-06-07 NOTE — ED PROVIDER NOTE - GASTROINTESTINAL, MLM
Abdomen soft, abd mild tender epigastric area, no guarding. Abdomen soft, abd mild tender epigastric area, no guarding, no rebound

## 2021-06-08 LAB
CULTURE RESULTS: NO GROWTH — SIGNIFICANT CHANGE UP
SPECIMEN SOURCE: SIGNIFICANT CHANGE UP

## 2021-07-02 ENCOUNTER — EMERGENCY (EMERGENCY)
Facility: HOSPITAL | Age: 51
LOS: 1 days | Discharge: HOME | End: 2021-07-02
Attending: EMERGENCY MEDICINE | Admitting: EMERGENCY MEDICINE
Payer: COMMERCIAL

## 2021-07-02 VITALS
OXYGEN SATURATION: 96 % | RESPIRATION RATE: 15 BRPM | DIASTOLIC BLOOD PRESSURE: 87 MMHG | HEART RATE: 76 BPM | TEMPERATURE: 98 F | SYSTOLIC BLOOD PRESSURE: 137 MMHG

## 2021-07-02 DIAGNOSIS — Z30.430 ENCOUNTER FOR INSERTION OF INTRAUTERINE CONTRACEPTIVE DEVICE: Chronic | ICD-10-CM

## 2021-07-02 DIAGNOSIS — Z90.721 ACQUIRED ABSENCE OF OVARIES, UNILATERAL: Chronic | ICD-10-CM

## 2021-07-02 DIAGNOSIS — Z98.890 OTHER SPECIFIED POSTPROCEDURAL STATES: Chronic | ICD-10-CM

## 2021-07-02 DIAGNOSIS — Z98.1 ARTHRODESIS STATUS: Chronic | ICD-10-CM

## 2021-07-02 DIAGNOSIS — Z90.49 ACQUIRED ABSENCE OF OTHER SPECIFIED PARTS OF DIGESTIVE TRACT: Chronic | ICD-10-CM

## 2021-07-02 DIAGNOSIS — Z98.51 TUBAL LIGATION STATUS: Chronic | ICD-10-CM

## 2021-07-02 PROCEDURE — 99284 EMERGENCY DEPT VISIT MOD MDM: CPT | Mod: 25

## 2021-07-02 PROCEDURE — 73080 X-RAY EXAM OF ELBOW: CPT | Mod: 26,RT

## 2021-07-02 PROCEDURE — 70450 CT HEAD/BRAIN W/O DYE: CPT

## 2021-07-02 PROCEDURE — 73080 X-RAY EXAM OF ELBOW: CPT

## 2021-07-02 PROCEDURE — 72125 CT NECK SPINE W/O DYE: CPT | Mod: 26

## 2021-07-02 PROCEDURE — 70450 CT HEAD/BRAIN W/O DYE: CPT | Mod: 26

## 2021-07-02 PROCEDURE — 72125 CT NECK SPINE W/O DYE: CPT

## 2021-07-02 PROCEDURE — 99284 EMERGENCY DEPT VISIT MOD MDM: CPT

## 2021-07-02 RX ORDER — IBUPROFEN 200 MG
600 TABLET ORAL ONCE
Refills: 0 | Status: COMPLETED | OUTPATIENT
Start: 2021-07-02 | End: 2021-07-02

## 2021-07-02 RX ADMIN — Medication 600 MILLIGRAM(S): at 14:44

## 2021-07-02 NOTE — ED PROVIDER NOTE - PROGRESS NOTE DETAILS
Reevaluated patient at bedside.  Patient feeling well.  Discussed the results of all diagnostic testing in ED and copies of available reports given.   An opportunity to ask questions was given.  Discussed the importance of prompt, close medical follow-up.  Patient will return with any changes, concerns or persistent / worsening symptoms.  Understanding of all instructions verbalized.

## 2021-07-02 NOTE — ED PROVIDER NOTE - ENMT, MLM
Airway patent. Mouth with normal mucosa. NCAT Airway patent. Mouth with normal mucosa. NCAT. well healing thyroid surg site anterior neck with steristrips in place no erythema or d/c

## 2021-07-02 NOTE — ED PROVIDER NOTE - PATIENT PORTAL LINK FT
You can access the FollowMyHealth Patient Portal offered by [facility] by registering at the following website: [email or web address]. By joining [employer]’s FollowMyHealth portal, you will also be able to view your health information using other applications (apps) compatible with our system.

## 2021-07-02 NOTE — ED PROVIDER NOTE - RELIEVING FACTORS
Final Anesthesia Post-op Assessment    Patient: Nisha Pope  Procedure(s) Performed: LABOR ANALGESIA  Anesthesia type: L&D Epidural    Vital Last Value   Temperature 36.6 °C (97.9 °F) (08/02/17 0850)   Pulse 67 (08/02/17 0850)   Respiratory Rate 16 (08/01/17 2324)   Non-Invasive   Blood Pressure 118/63 (08/02/17 0850)   Arterial  Blood Pressure     Pulse Oximetry 98 % (08/01/17 1629)     Last 24 I/O:   Intake/Output Summary (Last 24 hours) at 08/02/17 1518  Last data filed at 08/01/17 5693   Gross per 24 hour   Intake                0 ml   Output              600 ml   Net             -600 ml       PATIENT LOCATION: Labor and Delivery  POST-OP VITAL SIGNS: stable  LEVEL OF CONSCIOUSNESS: participates in exam, awake, oriented, answers questions appropriately and alert  RESPIRATORY STATUS: spontaneous ventilation  CARDIOVASCULAR: stable  HYDRATION: euvolemic    PAIN MANAGEMENT: well controlled  NAUSEA: None  AIRWAY PATENCY: patent  POST-OP ASSESSMENT: no complications, patient tolerated procedure well with no complications and sufficiently recovered from acute administration of anesthesia effects and able to participate in evaluation  COMPLICATIONS: none  Comments: Patient is doing well.  She has no complaints.  Denies having headaches, nausea/vomiting, fevers/chills.  No backaches.  Ambulating without difficulty.  She has no questions / concerns for anesthesia at this time.  No evident anesthetic complications.         none
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
## 2021-07-02 NOTE — ED PROVIDER NOTE - DISPOSITION TYPE
Problem: Falls - Risk of  Goal: Absence of falls  Outcome: Met This Shift  Patient is a fall risk during this admission. Fall risk assessment was performed. Patient is absent of falls. Bed is in the lowest position. Wheels on the bed are locked. Call light and bed side table are within reach. Clutter is removed. Patient was educated to call out when needing assistance or wanting to get out of bed. Patient offered toileting assistance during rounding. Hourly rounds have been performed. Problem: Airway Clearance - Ineffective:  Goal: Clear lung sounds  Clear lung sounds   Outcome: Ongoing  Lung with exp wheezes.  Patient receiving IV antibiotic , Iv steroids resp tx DISCHARGE

## 2021-07-02 NOTE — ED PROVIDER NOTE - CARE PROVIDER_API CALL
Neris Haynes)  Orthopaedic Surgery Surgery  [address] [place] [address]  Phone: [phone]  Fax: [phone]  Follow Up Time: 1-3 Days
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
## 2021-07-09 RX ORDER — METOPROLOL TARTRATE 50 MG
0 TABLET ORAL
Qty: 0 | Refills: 0 | DISCHARGE

## 2021-07-09 RX ORDER — AMLODIPINE BESYLATE 2.5 MG/1
0 TABLET ORAL
Qty: 0 | Refills: 0 | DISCHARGE

## 2021-07-09 RX ORDER — GABAPENTIN 400 MG/1
0 CAPSULE ORAL
Qty: 0 | Refills: 0 | DISCHARGE

## 2021-08-09 NOTE — REASON FOR VISIT
Patient: John Martínez Date: 2021   : 1939 Attending: Tod Trevino MD   82 year old male Room: /A     Chief Complaint: HFrEF  Post-op Day #: 12  Surgical Procedure: redo sternotomy, LVAD placement, tricuspid valve repair with DeVaga suture, closure of aortic valve with cusp sutures    Subjective: dry mouth.  Not sleeping at night.     Vital Last Value 24 Hour Range   Temperature 96.6 °F (35.9 °C) (21 0800) Temp  Min: 96.6 °F (35.9 °C)  Max: 98 °F (36.7 °C)   Pulse (!) 101 (21 0947) Pulse  Min: 100  Max: 101   Respiratory Rate 18 (21 1000) Resp  Min: 18  Max: 20   Non-Invasive   Blood Pressure 110/52 (21 0947) BP  Min: 110/52  Max: 110/52   Pulse Oximetry 96 % (21 1000) SpO2  Min: 96 %  Max: 98 %     Oxygen:  2LNC.     Weight over the past 48 Hours:   Patient Vitals for the past 48 hrs:   Weight   21 0500 63.9 kg   21 0500 62.5 kg      Admit Weight:   Weight: 69.4 kg (21)  BMI:   BMI (Calculated): 26.26 (21)    Intake/Output:    Last Stool Occurrence: 1 (21 0100)    I/O this shift:  In: 360 [P.O.:360]  Out: 375 [Urine:375]    I/O last 3 completed shifts:  In: 1460 [P.O.:1460]  Out: 900 [Urine:900]      Intake/Output Summary (Last 24 hours) at 2021 1206  Last data filed at 2021 1100  Gross per 24 hour   Intake 1200 ml   Output 1075 ml   Net 125 ml       Rhythm: Sinus rhythm and Sinus tachycardia     Physical Exam:     Heart: Left Ventricular Assist Device 3.4900/4.3/3.2  Lungs: Diminished breath sounds  bibasilar  Abdomen: soft, NT, active BT,+BM  Incision(s): Sternal CDI and Ventricular Assist Device with  serous drainage and suture  Neurologic: Grossly normal  Extremities:Warm extremities. +2 pitting BLE edema    Laboratory Results:    Recent Labs   Lab 21  0502 21  1835 21  1059 21  0419 21  1218 21  0442 21  0427   SODIUM 135  --  131* 128*   < > 126* 128*   POTASSIUM 4.1  3.9 3.5 3.7   < > 4.2 3.8   CHLORIDE 95*  --  91* 90*   < > 87* 90*   CO2 31  --  28 27   < > 30 31   BUN 57*  --  54* 47*   < > 36* 35*   CREATININE 1.63*  --  1.58* 1.54*   < > 1.37* 1.13   GLUCOSE 123*  --  163* 107*   < > 92 98   MG  --   --   --  1.9  --   --   --    WBC 14.7*  --  15.4* 15.2*   < > 16.7* 16.2*   HGB 7.8*  --  8.1* 7.8*   < > 7.9* 8.2*   HCT 25.7*  --  26.4* 24.5*   < > 24.5* 25.5*   *  --  469* 400   < > 237 206   PT 25.0*  --  26.4* 38.0*  --  29.0* 19.7*   INR 2.5  --  2.6 3.9  --  2.9 1.9   PTT 32*  --  34* 38*   < > 52* 34*   BILIRUBIN  --   --   --   --   --  3.0* 3.2*   ALKPT  --   --   --   --   --  328* 379*   AST  --   --   --   --   --  49* 51*   GPT  --   --   --   --   --  53 59    < > = values in this interval not displayed.     Cultures: Reviewed    Chest X-Ray: Reviewed 8/7    Medications/Infusions:  Scheduled:   • bumetanide  3 mg Intravenous BID   • melatonin  9 mg Oral Nightly   • polyethylene glycol  17 g Oral TID   • hydrALAZINE  75 mg Oral TID   • [Held by provider] spironolactone  50 mg Oral Daily   • Potassium Standard Replacement Protocol   Does not apply See Admin Instructions   • Magnesium Standard Replacement Protocol   Does not apply See Admin Instructions   • famotidine  20 mg Oral Daily   • aspirin  325 mg Oral Daily   • WARFARIN - PHYSICIAN MONITORED 1 each  1 each Does not apply Q Evening   • docusate sodium-sennosides  2 tablet Oral Daily   • allopurinol  100 mg Oral Daily   • sodium chloride (PF)  10 mL Injection 2 times per day   • AMIODarone  200 mg Oral Daily   • doxepin  25 mg Oral Nightly       CMS Criteria:  ASA: Yes    BB: Not Applicable    Statin:Statin not ordered due to Other: Elevated LFT's    ACE: ACEI / ARB not ordered due to Renal Insufficiency    Surgical Central Line: No    Cardiologist: HF cardiology  Nares MRSA: Negative MSSA: Negative  Hemoglobin A1c: 6.1  Preoperative Creatinine: 1.13    ECHO 8/3/21:  HeartMate III LVAD, deVega  annuloplasty repair, AV suture repair (7/27/2021).  Moderate LV cavity enlargement. Severe LV systolic dysfunction s/p LVAD.  Inflow Cannula = .55 m/sec. Outflow Cannula = 1.59 m/sec.  Significant shadowing of LV from LVAD inflow cannula precludes assessment of LV cavity and/or thrombus.  Mild RV cavity enlargement. Severe RV systolic dysfunction.  Aortic valve not well visualized. Mild aortic valve regurgitation.  No pericardial effusion.  Compared to prior study, patient is s/p LVAD.    Assessment/Plan:  S/P: redo sternotomy, LVAD placement, tricuspid valve repair with DeVaga suture, closure of aortic valve with cusp sutures  ASA/Coumadin,    ECHO as above- reviewed by surgeon  N&V: resolved  Acute blood loss anemia: Transfused 7/29. Stable   History of paroxysmal atrial fibrillation/BiV ICD: RV lead cut. RA lead only functional lead.  No intervention at this time/hospital stay  CKD 2/3/hyponatremia: Nephrology following.  on BID bumex  Fluid restrict   Elevated LFT's: Monitor   Deconditioning: PT/OT. IPR insurance still denied after appeal   [facility] education: family passed hands on and written training.  Patient and wife need further hands on education  Hematuria:  Resolved. Outpatient urology follow up  Depressed/crying: consult psychology.   Insomnia:  On melatonin not working.  Will try something else for sleep trazadone  Dry mouth: d/w wife who will bring in sour candy. Biotine. Ice cubes      Pathways:  Ambulating: Yes  Coumadin: Yes, for Atrial Fibrillation and Mechanical Circulatory Support Device    Discussed with or notes reviewed:  Patient, RN and MD    Discharge Disposition: IPR denied after appeal.    [Consultation] : a consultation visit [FreeTextEntry1] : Dr. Jackson

## 2021-12-15 ENCOUNTER — EMERGENCY (EMERGENCY)
Facility: HOSPITAL | Age: 51
LOS: 0 days | Discharge: HOME | End: 2021-12-15
Attending: STUDENT IN AN ORGANIZED HEALTH CARE EDUCATION/TRAINING PROGRAM
Payer: MEDICAID

## 2021-12-15 VITALS — HEIGHT: 63 IN | WEIGHT: 179.9 LBS

## 2021-12-15 VITALS
DIASTOLIC BLOOD PRESSURE: 77 MMHG | HEART RATE: 72 BPM | TEMPERATURE: 98 F | OXYGEN SATURATION: 98 % | SYSTOLIC BLOOD PRESSURE: 136 MMHG | RESPIRATION RATE: 16 BRPM

## 2021-12-15 DIAGNOSIS — S92.534A NONDISPLACED FRACTURE OF DISTAL PHALANX OF RIGHT LESSER TOE(S), INITIAL ENCOUNTER FOR CLOSED FRACTURE: ICD-10-CM

## 2021-12-15 DIAGNOSIS — Z91.018 ALLERGY TO OTHER FOODS: ICD-10-CM
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
## 2021-12-15 DIAGNOSIS — I10 ESSENTIAL (PRIMARY) HYPERTENSION: ICD-10-CM

## 2021-12-15 DIAGNOSIS — Z98.51 TUBAL LIGATION STATUS: Chronic | ICD-10-CM

## 2021-12-15 DIAGNOSIS — E78.5 HYPERLIPIDEMIA, UNSPECIFIED: ICD-10-CM

## 2021-12-15 DIAGNOSIS — Z79.84 LONG TERM (CURRENT) USE OF ORAL HYPOGLYCEMIC DRUGS: ICD-10-CM

## 2021-12-15 DIAGNOSIS — Z90.721 ACQUIRED ABSENCE OF OVARIES, UNILATERAL: Chronic | ICD-10-CM

## 2021-12-15 DIAGNOSIS — Z90.49 ACQUIRED ABSENCE OF OTHER SPECIFIED PARTS OF DIGESTIVE TRACT: Chronic | ICD-10-CM

## 2021-12-15 DIAGNOSIS — Z98.1 ARTHRODESIS STATUS: Chronic | ICD-10-CM

## 2021-12-15 DIAGNOSIS — Z30.430 ENCOUNTER FOR INSERTION OF INTRAUTERINE CONTRACEPTIVE DEVICE: Chronic | ICD-10-CM

## 2021-12-15 DIAGNOSIS — Z98.890 OTHER SPECIFIED POSTPROCEDURAL STATES: Chronic | ICD-10-CM

## 2021-12-15 DIAGNOSIS — E78.00 PURE HYPERCHOLESTEROLEMIA, UNSPECIFIED: ICD-10-CM
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
## 2021-12-15 DIAGNOSIS — M79.674 PAIN IN RIGHT TOE(S): ICD-10-CM

## 2021-12-15 DIAGNOSIS — E11.9 TYPE 2 DIABETES MELLITUS WITHOUT COMPLICATIONS: ICD-10-CM

## 2021-12-15 PROCEDURE — 73630 X-RAY EXAM OF FOOT: CPT | Mod: 26,RT

## 2021-12-15 PROCEDURE — 73630 X-RAY EXAM OF FOOT: CPT | Mod: RT

## 2021-12-15 PROCEDURE — 99283 EMERGENCY DEPT VISIT LOW MDM: CPT | Mod: 25

## 2021-12-15 PROCEDURE — 99284 EMERGENCY DEPT VISIT MOD MDM: CPT

## 2021-12-15 RX ORDER — ACETAMINOPHEN 500 MG
650 TABLET ORAL ONCE
Refills: 0 | Status: COMPLETED | OUTPATIENT
Start: 2021-12-15 | End: 2021-12-15

## 2021-12-15 RX ADMIN — Medication 650 MILLIGRAM(S): at 12:30

## 2021-12-15 NOTE — ED STATDOCS - PROGRESS NOTE DETAILS
50 yo female presents with R 4th toe injury today after falling down. Denies any other injuries. Will check xr and reeval. -Malik Aguero PA-C Xr with possible tuft fracture. Will cherie tape and send for podiatry. -Malik Aguero PA-C

## 2021-12-15 NOTE — ED STATDOCS - CARE PROVIDER_API CALL
Isai Nettles J  ORTHOPAEDIC SURGERY  [address] [place] [address]  Phone: [phone]  Fax: [phone]  Follow Up Time:

## 2021-12-15 NOTE — ED STATDOCS - ATTENDING CONTRIBUTION TO CARE
I, Jessy Henderson DO,  performed the initial face to face bedside interview with this patient regarding history of present illness, review of symptoms and relevant past medical, social and family history.  I completed an independent physical examination.  I was the initial provider who evaluated this patient. I have signed out the follow up of any pending tests (i.e. labs, radiological studies) to the ACP.  I have communicated the patient’s plan of care and disposition with the ACP.  The history, relevant review of systems, past medical and surgical history, medical decision making, and physical examination was documented by the scribe in my presence and I attest to the accuracy of the documentation.

## 2021-12-15 NOTE — ED STATDOCS - NSICDXPASTSURGICALHX_GEN_ALL_CORE_FT
PAST SURGICAL HISTORY:  Encounter for insertion of mirena IUD 2017    H/O tubal ligation 2008 [facility]    History of arthroscopy of right knee 9/2018    History of thyroid surgery 6/22/21    S/P cervical spinal fusion 10/2017 C6-C7 ACDF    S/P laparoscopic cholecystectomy     S/P left oophorectomy 1/2018 - benign, per pt/family

## 2021-12-15 NOTE — ED ADULT TRIAGE NOTE - CHIEF COMPLAINT QUOTE
Patient presents in ED s/p toe injury. Patient complains of right foot 4th digit pain and bruising. Patient states " I jammed it while walking up the stairs".

## 2021-12-15 NOTE — ED STATDOCS - NSICDXPASTMEDICALHX_GEN_ALL_CORE_FT
PAST MEDICAL HISTORY:  Cervical disc displacement     Cervical radiculopathy     Chronic pain had steroid injections 1 month ago    Diabetes mellitus     Gall stones s/p cholecystectomy    Globus sensation c/o dysphagia - s/p laryngoscopy 12/2018 Dr Jackson - [facility]    High cholesterol     Hypertension     Hypertension     Menorrhagia with irregular cycle Mirena IUD placed    Migraines     Shoulder impingement, right     Shoulder injury per pt/family - while in Rehab after 10/2017 C6-C7 ACDF, television fell from mount on wall and struck her in head/right shoulder    Spinal stenosis of lumbosacral region     Spondylolisthesis     Thyroid nodule s/p biopsy 2/2019 - nontoxic

## 2021-12-15 NOTE — ED STATDOCS - NSFOLLOWUPINSTRUCTIONS_ED_ALL_ED_FT
Follow up with the podiatrist for further evaluation of your broken toe.   Continue to cherie tape the toes  Take motrin/advil/aleve.  Ice and elevate the foot.    Return to the ER for any new or other concerns.       Fractura de un dedo del pie    Toe Fracture    Laura fractura de un dedo del pie es laura quebradura en olesya de los huesos de los dedos del pie (falanges). La fractura de un dedo del pie puede ser:  •Laura fisura en la superficie del hueso (fractura por sobrecarga). [place] ocurre con frecuencia en los atletas.      •Laura ruptura en todo el hueso (fractura completa).        ¿Cuáles son las causas?  Esta afección puede ser causada por lo siguiente:  •Un impacto directo, bushra si se le  un objeto pesado sobre el dedo del pie.      •Un golpe en el dedo del pie.      •La torsión o el estiramiento del dedo del pie con desplazamiento.      •El uso excesivo de esthela efren o el ejercicio repetitivo.        ¿Qué incrementa el riesgo?  Es más probable que tenga esta afección si:  •Practica deportes de contacto.      •Tiene laura afección que hace que los huesos se vuelvan delgados y frágiles (osteoporosis).      •Tiene bajos niveles de calcio.        ¿Cuáles son los signos o síntomas?  Los principales síntomas de esta afección son la hinchazón y el dolor del dedo del pie. Otros síntomas pueden ser los siguientes:  •Moretones.      •Rigidez.      •Entumecimiento.      •Un cambio en el aspecto del dedo del pie.      •Huesos fracturados que sobresalen a través de la piel.      •Koko debajo de la uña del pie.        ¿Cómo se diagnostica?    Esta afección se diagnostica mediante un examen físico. También puede ser necesario que le tomen radiografías.      ¿Cómo se trata?  El tratamiento de esta afección depende del tipo de fractura y de la gravedad. El tratamiento puede incluir lo siguiente:  •Vendar el dedo fracturado del pie junto con un dedo adyacente (vendaje de inmovilización). Carmine es el tratamiento más frecuente de las fracturas en las que el hueso no se ha desplazado de [place] lugar (fractura sin desplazamiento).      •Usar un calzado con suela ancha y rígida para proteger el dedo del pie y limitar sk[place] movimiento.      •Usar un yeso para caminar.      •Someterse a un procedimiento para reacomodar el dedo del pie.    •Alura cirugía. Esta puede ser necesaria si:  •Hay fragmentos del hueso quebrado fuera de [place] lugar (desplazados).      •El hueso sobresale a través de la piel.        •Fisioterapia. Esta se realiza para ayudar a recuperar el movimiento y la fuerza del dedo del pie.      Mariano vez deba hacerse radiografías de control para asegurarse de que el hueso se esté consolidando bess y se mantenga en skelton posición.      Siga estas indicaciones en [place] casa:    Si tiene un zapato:     •Use el zapato bushra se lo haya indicado el médico. Quíteselo solamente bushra se lo haya indicado el médico.       •Afloje el zapato si siente hormigueo en los dedos de los pies, si se le entumecen, o se le enfrían y se tornan de color nolan.      •Mantenga el zapato seco y limpio.      Si tiene un yeso:     • No ejerza presión en ninguna parte del yeso hasta que se haya endurecido por completo. [place] puede tardar varias horas.      • No introduzca nada dentro del yeso para rascarse la piel. [place] puede aumentar el riesgo de contraer laura infección.      •Controle todos los días la piel de alrededor del yeso. Informe al médico acerca de cualquier inquietud.       • Puede aplicar laura loción en la piel seca alrededor de los bordes del yeso. No aplique loción en la piel por debajo del yeso.       •Mantenga el yeso seco y limpio.      Bañarse     • No tome tereso de inmersión, no nade ni use el jacuzzi hasta que el médico lo autorice. Pregúntele al médico si puede ducharse.    •Si el zapato o el yeso no es impermeable:  •No deje que se moje.       •Cúbralos con un envoltorio hermético cuando tome un baño de inmersión o laura ducha.        Actividad     • No apoye el peso del cuerpo sobre el pie lesionado hasta que lo autorice el médico. Utilice las muletas bushra se lo hayan indicado.    •Pregúntele al médico:  •Qué actividades son seguras para usted mientras se recupera.      •Qué actividades tiene que evitar.        •Teja los ejercicios de fisioterapia bushra se lo hayan indicado.      Conducir     • No conduzca ni use maquinaria pesada mientras esté tomando analgésicos.      • No conduzca mientras usa un yeso en un pie.        Control del dolor, el entumecimiento y la hinchazón    •Si se lo indican, aplique hielo sobre las zonas doloridas:  •Ponga el hielo en laura bolsa plástica.    •Coloque laura toalla entre la piel y la bolsa.  •Si tiene un zapato, quíteselo bushra se lo haya indicado el médico.      •Si tiene un yeso, coloque laura toalla entre el yeso y la bolsa de hielo.        •Coloque el hielo chivo 20 minutos, 2 a 3 veces al día.        •Cuando esté sentado o acostado, alce (eleve) la efren de la lesión por encima del nivel del corazón.      Indicaciones generales   •Si en el dedo le colocaron un vendaje de inmovilización, siga las indicaciones del médico para cambiar la gasa y la cinta adhesiva. Cámbielas con más frecuencia:  •Si la gasa y la cinta adhesiva se mojan. Si esto ocurre, seque el espacio entre los dedos.      •Si la gasa y la cinta adhesiva están muy apretadas y hacen que el dedo esté pálido o entumecido.        •Si no le indicaron un calzado de carmine tipo, use olesya que sea resistente y tenga buen apoyo. El calzado no debe comprimirle ni apretarle los dedos.      • No consuma ningún producto que contenga nicotina o tabaco, bushra cigarrillos y cigarrillos electrónicos. Estos pueden retrasar la consolidación del hueso. Si necesita ayuda para dejar de consumir, consulte al médico.      •Deonte los medicamentos de venta ana laura y los recetados solamente bushra se lo haya indicado el médico.      •Concurrir a todas las visitas de control bushra se lo haya indicado el médico. [place] es importante.        Comunícate con un médico si tienes:    •Un dolor que empeora o que no mejora con los medicamentos.      •Fiebre.      •Mal olor proveniente del yeso.        Solicita ayuda inmediatamente si tienes:  •Alguno de los siguientes signos o síntomas en los dedos del pie o el pie:  •Entumecimiento que empeora.      •Hormigueo.      •Piel fría.      •Piel azulada.        •Enrojecimiento o hinchazón que empeoran.      •Dolor que se intensifica de manera repentina.        Resumen    •Laura fractura de un dedo del pie es laura quebradura en olesya de los huesos de los dedos del pie (falanges).      •El tratamiento depende de la gravedad de la fractura y cómo están alineadas las partes del hueso roto. El tratamiento puede incluir la colocación de un vendaje de inmovilización, usar un zapato o un yeso, o usar muletas.      •Aplique hielo y eleve el pie para ayudar a disminuir el dolor y la hinchazón.      Esta información no tiene bushra fin reemplazar el consejo del médico. Asegúrese de hacerle al médico cualquier pregunta que tenga.

## 2021-12-15 NOTE — ED ADULT NURSE NOTE - OBJECTIVE STATEMENT
Patient presents in ED s/p toe injury. Patient complains of right foot 4th digit pain and bruising. Patient states " I jammed it while walking up the stairs" today.

## 2021-12-15 NOTE — ED ADULT NURSE NOTE - NSICDXPASTMEDICALHX_GEN_ALL_CORE_FT
PAST MEDICAL HISTORY:  Cervical disc displacement     Cervical radiculopathy     Chronic pain had steroid injections 1 month ago    Diabetes mellitus     Gall stones s/p cholecystectomy    Globus sensation c/o dysphagia - s/p laryngoscopy 12/2018 Dr Jackson - [facility]    High cholesterol     Hypertension     Hypertension     Menorrhagia with irregular cycle Mirena IUD placed    Migraines     Shoulder impingement, right     Shoulder injury per pt/family - while in Rehab after 10/2017 C6-C7 ACDF, television fell from mount on wall and struck her in head/right shoulder    Spinal stenosis of lumbosacral region     Spondylolisthesis     Thyroid nodule s/p biopsy 2/2019 - nontoxic

## 2021-12-15 NOTE — ED STATDOCS - OBJECTIVE STATEMENT
52 y/o F with PMHx of HTN, HLD, DM2, and s/p cholecystectomy presents to the ED c/o +R 4th toe pain s/p hitting on edge of stair. No other injury or fever. Did not take anything for pain. NKDA. PCP: Dr. Andrew Camara.

## 2021-12-15 NOTE — ED ADULT NURSE NOTE - NSICDXPASTSURGICALHX_GEN_ALL_CORE_FT
PAST SURGICAL HISTORY:  Encounter for insertion of mirena IUD 2017    H/O tubal ligation 2008 [facility]    History of arthroscopy of right knee 9/2018    History of thyroid surgery 6/22/21    S/P cervical spinal fusion 10/2017 C6-C7 ACDF    S/P laparoscopic cholecystectomy     S/P left oophorectomy 1/2018 - benign, per pt/family

## 2021-12-15 NOTE — ED STATDOCS - CARDIAC, MLM
Chart reviewed. Reached out to pt regarding follow up of complex case management of diabetes.     1/7/20-dkw  -pt here today for f/u with Dr. Key Soto; his mom is in rehab facility in [place] s/p stroke        -pt's last a1c was 7.8% on 12/5/19 during inpt at [facility] for acute ischemic stroke           that affected vision in his right eye; pt has h/o aortic dissection; vision in his right            eye is slowly coming back; his next appt with Dr. Robin Vázquez is March 16th; home                 health only came out twice  -is checking blood sugars a few times a week fasting; four days ago was 130 something; discussed importance of checking fasting every morning to help with self management of his diabetes; goal is <140  -pt is taking Invokana daily and just got his Januvia refilled; he will take both in the morning  -pt stopped Lexapro due to anxiety decreased when his vision started coming back  -reviewed healthy eating again today; appetite is good; he is drinking a lot of juice and will switch to no calorie drinks like Crystal Light  -discussed exercise like taking a 15 minute walk daily and to get advisement from Dr. 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 Key Soto on 4/9/20 normal rate, regular rhythm, and no murmur.

## 2021-12-15 NOTE — ED STATDOCS - PATIENT PORTAL LINK FT
You can access the FollowMyHealth Patient Portal offered by [facility] by registering at the following website: [email or web address]. By joining [employer]’s FollowMyHealth portal, you will also be able to view your health information using other applications (apps) compatible with our system.

## 2022-01-01 ENCOUNTER — EMERGENCY (EMERGENCY)
Facility: HOSPITAL | Age: 52
LOS: 0 days | Discharge: HOME | End: 2022-01-01
Attending: EMERGENCY MEDICINE
Payer: MEDICAID

## 2022-01-01 VITALS
HEART RATE: 83 BPM | RESPIRATION RATE: 18 BRPM | OXYGEN SATURATION: 97 % | SYSTOLIC BLOOD PRESSURE: 117 MMHG | TEMPERATURE: 98 F | DIASTOLIC BLOOD PRESSURE: 72 MMHG

## 2022-01-01 VITALS — WEIGHT: 190.04 LBS | HEIGHT: 63 IN

## 2022-01-01 DIAGNOSIS — Z90.721 ACQUIRED ABSENCE OF OVARIES, UNILATERAL: Chronic | ICD-10-CM

## 2022-01-01 DIAGNOSIS — U07.1 COVID-19: ICD-10-CM

## 2022-01-01 DIAGNOSIS — J02.9 ACUTE PHARYNGITIS, UNSPECIFIED: ICD-10-CM

## 2022-01-01 DIAGNOSIS — Z98.890 OTHER SPECIFIED POSTPROCEDURAL STATES: Chronic | ICD-10-CM

## 2022-01-01 DIAGNOSIS — Z79.84 LONG TERM (CURRENT) USE OF ORAL HYPOGLYCEMIC DRUGS: ICD-10-CM

## 2022-01-01 DIAGNOSIS — Z98.1 ARTHRODESIS STATUS: Chronic | ICD-10-CM

## 2022-01-01 DIAGNOSIS — R52 PAIN, UNSPECIFIED: ICD-10-CM

## 2022-01-01 DIAGNOSIS — R05.9 COUGH, UNSPECIFIED: ICD-10-CM

## 2022-01-01 DIAGNOSIS — Z98.51 TUBAL LIGATION STATUS: Chronic | ICD-10-CM

## 2022-01-01 DIAGNOSIS — I10 ESSENTIAL (PRIMARY) HYPERTENSION: ICD-10-CM

## 2022-01-01 DIAGNOSIS — E11.9 TYPE 2 DIABETES MELLITUS WITHOUT COMPLICATIONS: ICD-10-CM

## 2022-01-01 DIAGNOSIS — E78.5 HYPERLIPIDEMIA, UNSPECIFIED: ICD-10-CM

## 2022-01-01 DIAGNOSIS — Z90.49 ACQUIRED ABSENCE OF OTHER SPECIFIED PARTS OF DIGESTIVE TRACT: Chronic | ICD-10-CM

## 2022-01-01 DIAGNOSIS — Z30.430 ENCOUNTER FOR INSERTION OF INTRAUTERINE CONTRACEPTIVE DEVICE: Chronic | ICD-10-CM

## 2022-01-01 DIAGNOSIS — Z79.82 LONG TERM (CURRENT) USE OF ASPIRIN: ICD-10-CM

## 2022-01-01 LAB
FLUAV AG NPH QL: SIGNIFICANT CHANGE UP
FLUBV AG NPH QL: SIGNIFICANT CHANGE UP
RSV RNA NPH QL NAA+NON-PROBE: SIGNIFICANT CHANGE UP
SARS-COV-2 RNA SPEC QL NAA+PROBE: DETECTED

## 2022-01-01 PROCEDURE — 0241U: CPT

## 2022-01-01 PROCEDURE — 99283 EMERGENCY DEPT VISIT LOW MDM: CPT

## 2022-01-01 PROCEDURE — 99284 EMERGENCY DEPT VISIT MOD MDM: CPT

## 2022-01-01 RX ORDER — GUAIFENESIN/DEXTROMETHORPHAN 600MG-30MG
1 TABLET, EXTENDED RELEASE 12 HR ORAL
Qty: 28 | Refills: 0
Start: 2022-01-01 | End: 2022-01-14

## 2022-01-01 RX ADMIN — Medication 100 MILLIGRAM(S): at 15:13

## 2022-01-01 NOTE — ED STATDOCS - PROGRESS NOTE DETAILS
history with assistance from  ID [id number]. 52 y/o F with PMH of DM, HTN, HLD presents with sore throat, cough, body aches, chills x 5 days. +sick contacts at home with similar symptoms. Pt vaccinated for covid and flu. PE; Well appearing. HEENT: PERRLA, EOMI. No otopharyngeal erythema, edema, tonsilar enlargement/exudates, uvula midline. Cardiac: s1s2, RRR. lungs: CTAB. Abdomen: NBS x4, soft, nontender. history with assistance from  ID [id number]. 52 y/o F with PMH of DM, HTN, HLD presents with sore throat, cough, body aches, chills x 5 days. +sick contacts at home with similar symptoms. Pt vaccinated for covid and flu. PE; Well appearing. HEENT: PERRLA, EOMI. No otopharyngeal erythema, edema, tonsilar enlargement/exudates, uvula midline. Cardiac: s1s2, RRR. lungs: CTAB. Abdomen: NBS x4, soft, nontender. A/P: Pharyngitis, Will swab for covid, treat symptomatically. dc home. - Brian Pathak PA-C

## 2022-01-01 NOTE — ED STATDOCS - NSFOLLOWUPINSTRUCTIONS_ED_ALL_ED_FT
Pharyngitis    WHAT YOU NEED TO KNOW:    Pharyngitis, or sore throat, is inflammation of the tissues and structures in your pharynx (throat). Pharyngitis is most often caused by bacteria. It may also be caused by a cold or flu virus. Other causes include smoking, allergies, or acid reflux.     DISCHARGE INSTRUCTIONS:    Call 911 for any of the following:     You have trouble breathing or swallowing because your throat is swollen or sore.        Return to the emergency department if:     You are drooling because it hurts too much to swallow.      Your fever is higher than 102°F (39°C) or lasts longer than 3 days.      You are confused.      You taste blood in your throat.    Contact your healthcare provider if:     Your throat pain gets worse.      You have a painful lump in your throat that does not go away after 5 days.      Your symptoms do not improve after 5 days.      You have questions or concerns about your condition or care.    Medicines: Viral pharyngitis will go away on its own without treatment. Your sore throat should start to feel better in 3 to 5 days for both viral and bacterial infections. You may need any of the following:     Antibiotics treat a bacterial infection.      NSAIDs, such as ibuprofen, help decrease swelling, pain, and fever. NSAIDs can cause stomach bleeding or kidney problems in certain people. If you take blood thinner medicine, always ask your healthcare provider if NSAIDs are safe for you. Always read the medicine label and follow directions.      Acetaminophen decreases pain and fever. It is available without a doctor's order. Ask how much to take and how often to take it. Follow directions. Acetaminophen can cause liver damage if not taken correctly.      Take your medicine as directed. Contact your healthcare provider if you think your medicine is not helping or if you have side effects. Tell him or her if you are allergic to any medicine. Keep a list of the medicines, vitamins, and herbs you take. Include the amounts, and when and why you take them. Bring the list or the pill bottles to follow-up visits. Carry your medicine list with you in case of an emergency.    Manage your symptoms:     Gargle salt water. Mix ¼ teaspoon salt in an 8 ounce glass of warm water and gargle. This may help decrease swelling in your throat.      Drink liquids as directed. You may need to drink more liquids than usual. Liquids may help soothe your throat and prevent dehydration. Ask how much liquid to drink each day and which liquids are best for you.      Use a cool-steam humidifier to help moisten the air in your room and calm your cough.      Soothe your throat with cough drops, ice, soft foods, or popsicles.    Prevent the spread of pharyngitis: Cover your mouth and nose when you cough or sneeze. Do not share food or drinks. Wash your hands often. Use soap and water. If soap and water are unavailable, use an alcohol based hand .     Follow up with your healthcare provider as directed: Write down your questions so you remember to ask them during your visits.        COVID-19 (Coronavirus Disease 2019)    WHAT YOU NEED TO KNOW:    COVID-19 is the disease caused by a coronavirus first discovered in December 2019. Coronaviruses generally cause upper respiratory (nose, throat, and lung) infections, such as a cold. The 2019 virus spreads quickly and easily. It can be spread starting 2 to 3 days before symptoms even begin.    DISCHARGE INSTRUCTIONS:    Call your local emergency number (911 in the ) if:   •You have trouble breathing or shortness of breath at rest.      •You have chest pain or pressure that lasts longer than 5 minutes.      •You become confused or hard to wake.      •Your lips or face are blue.      Return to the emergency department if:   •You have a fever of 104°F (40°C) or higher.          Call your doctor if:   •You have symptoms of COVID-19.      •You have questions or concerns about your condition or care.      Medicines: You may need any of the following:   •Decongestants help reduce nasal congestion and help you breathe more easily. If you take decongestant pills, they may make you feel restless or cause problems with your sleep. Do not use decongestant sprays for more than a few days.      •Cough suppressants help reduce coughing. Ask your healthcare provider which type of cough medicine is best for you.      •Acetaminophen decreases pain and fever. It is available without a doctor's order. Ask how much to take and how often to take it. Follow directions. Read the labels of all other medicines you are using to see if they also contain acetaminophen, or ask your doctor or pharmacist. Acetaminophen can cause liver damage if not taken correctly. Do not use more than 4 grams (4,000 milligrams) total of acetaminophen in one day.       •NSAIDs, such as ibuprofen, help decrease swelling, pain, and fever. This medicine is available with or without a doctor's order. NSAIDs can cause stomach bleeding or kidney problems in certain people. If you take blood thinner medicine, always ask your healthcare provider if NSAIDs are safe for you. Always read the medicine label and follow directions.      •Blood thinners help prevent blood clots. Clots can cause strokes, heart attacks, and death. The following are general safety guidelines to follow while you are taking a blood thinner:?Watch for bleeding and bruising while you take blood thinners. Watch for bleeding from your gums or nose. Watch for blood in your urine and bowel movements. Use a soft washcloth on your skin, and a soft toothbrush to brush your teeth. This can keep your skin and gums from bleeding. If you shave, use an electric shaver. Do not play contact sports.       ?Tell your dentist and other healthcare providers that you take a blood thinner. Wear a bracelet or necklace that says you take this medicine.       ?Do not start or stop any other medicines unless your healthcare provider tells you to. Many medicines cannot be used with blood thinners.      ?Take your blood thinner exactly as prescribed by your healthcare provider. Do not skip does or take less than prescribed. Tell your provider right away if you forget to take your blood thinner, or if you take too much.      ?Warfarin is a blood thinner that you may need to take. The following are things you should be aware of if you take warfarin: ?Foods and medicines can affect the amount of warfarin in your blood. Do not make major changes to your diet while you take warfarin. Warfarin works best when you eat about the same amount of vitamin K every day. Vitamin K is found in green leafy vegetables and certain other foods. Ask for more information about what to eat when you are taking warfarin.      ?You will need to see your healthcare provider for follow-up visits when you are on warfarin. You will need regular blood tests. These tests are used to decide how much medicine you need.         •Take your medicine as directed. Contact your healthcare provider if you think your medicine is not helping or if you have side effects. Tell him or her if you are allergic to any medicine. Keep a list of the medicines, vitamins, and herbs you take. Include the amounts, and when and why you take them. Bring the list or the pill bottles to follow-up visits. Carry your medicine list with you in case of an emergency.      What you need to know about variants: The virus has changed into several new forms (called variants) since it was discovered. The variants may be more contagious (easily spread) than the original form. Some may also cause more severe illness than others.    What you need to know about COVID-19 vaccines: Healthcare providers recommend a COVID-19 vaccine, even if you have already had COVID-19. You are considered fully vaccinated against COVID-19 two weeks after the final dose of any COVID-19 vaccine. Let your healthcare provider know when you have received the final dose of the vaccine. Make a copy of your vaccination card. Keep the original with you in case you need to show it. Keep the copy in a safe place.  •COVID-19 vaccines are given as a shot in 1 or 2 doses.   Vaccination is recommended for everyone 5 years or older. One 2-dose vaccine is fully approved for those 16 or older. This vaccine also has an emergency use authorization (EUA) for children 5 to 15 years old. No vaccine is currently available for children younger than 5 years. An additional (booster) dose is also recommended for all adults 18 or older. The booster can be a different brand of the COVID-19 vaccine than you originally received. The timing for the booster depends on the type of vaccine you received:?1-dose vaccine: The booster is given at least 2 months after you received the vaccine.      ?2-dose vaccine: The booster is given at least 6 months after the second dose.    COVID-19 Immunization Schedule         •Continue social distancing and other measures, even after you get the vaccine. Although it is not common, you can become infected after you get the vaccine. You may also be able to pass the virus to others without knowing you are infected.      •After you get the vaccine, check local, national, and international travel rules. You may need to be tested before you travel. Some countries require proof of a negative test before you travel. You may also need to quarantine after you return.      How the 2019 coronavirus spreads:   •Droplets are the main way all coronaviruses spread. The virus travels in droplets that form when a person talks, sings, coughs, or sneezes. The droplets can also float in the air for minutes or hours. Infection happens when you breathe in the droplets or get them in your eyes or nose. Close personal contact with an infected person increases your risk for infection. This means being within 6 feet (2 meters) of the person for at least 15 minutes over 24 hours.      •Person-to-person contact can spread the virus. For example, a person with the virus on his or her hands can spread it by shaking hands with someone.      •The virus can stay on objects and surfaces for up to 3 days. You may become infected by touching the object or surface and then touching your eyes or mouth.      Help lower the risk for COVID-19:   •Wash your hands often throughout the day. Use soap and water. Rub your soapy hands together, lacing your fingers, for at least 20 seconds. Rinse with warm, running water. Dry your hands with a clean towel or paper towel. Use hand  that contains alcohol if soap and water are not available. Teach children how to wash their hands and use hand .  Handwashing           •Cover sneezes and coughs. Turn your face away and cover your mouth and nose with a tissue. Throw the tissue away. Use the bend of your arm if a tissue is not available. Then wash your hands well with soap and water or use hand . Teach children how to cover a cough or sneeze.      •Wear a face covering (mask) when needed. Use a cloth covering with at least 2 layers. You can also create layers by putting a cloth covering over a disposable non-medical mask. Cover your mouth and your nose.  How to Wear a Face Covering (Mask)           •Follow worldwide, national, and local social distancing guidelines. Keep at least 6 feet (2 meters) between you and others.      •Try not to touch your face. If you get the virus on your hands, you can transfer it to your eyes, nose, or mouth and become infected. You can also transfer it to objects, surfaces, or people.      •Clean and disinfect high-touch surfaces and objects often. Use disinfecting wipes, or make a solution of 4 teaspoons of bleach in 1 quart (4 cups) of water.      •Ask about other vaccines you may need. Get the influenza (flu) vaccine as soon as recommended each year, usually starting in September or October. Get the pneumonia vaccine if recommended. Your healthcare provider can tell you if you should also get other vaccines, and when to get them.    Prevent COVID-19 Infection         Follow social distancing guidelines: National and local social distancing rules vary. Rules and restrictions may change over time as restrictions are lifted. The following are general guidelines:   •Stay home if you are sick or think you may have COVID-19. It is important to stay home if you are waiting for a testing appointment or for test results.      •Avoid close physical contact with anyone who does not live in your home. Do not shake hands with, hug, or kiss a person as a greeting. If you must use public transportation (such as a bus or subway), try to sit or stand away from others. Wear your face covering.      •Avoid in-person gatherings and crowds. Attend virtually if possible.      Follow up with your doctor as directed: Write down your questions so you remember to ask them during your visits.    For more information:   •Centers for Disease Control and Prevention  [address] [place] [address]  Phone: 1-398.499.4195  Web Address: http://www.cdc.gov           © Copyright [employer] 2022

## 2022-01-01 NOTE — ED STATDOCS - NSICDXPASTMEDICALHX_GEN_ALL_CORE_FT
PAST MEDICAL HISTORY:  Cervical disc displacement     Cervical radiculopathy     Chronic pain had steroid injections 1 month ago    Diabetes mellitus     Gall stones s/p cholecystectomy    Globus sensation c/o dysphagia - s/p laryngoscopy 12/2018 Dr Jackson - [facility]    High cholesterol     Hypertension     Hypertension     Menorrhagia with irregular cycle Mirena IUD placed    Migraines     Shoulder impingement, right     Shoulder injury per pt/family - while in Rehab after 10/2017 C6-C7 ACDF, television fell from mount on wall and struck her in head/right shoulder    Spinal stenosis of lumbosacral region     Spondylolisthesis     Thyroid nodule s/p biopsy 2/2019 - nontoxic

## 2022-01-01 NOTE — ED STATDOCS - NSICDXPASTSURGICALHX_GEN_ALL_CORE_FT
PAST SURGICAL HISTORY:  Encounter for insertion of mirena IUD 2017    H/O tubal ligation 2008 [facility]    History of arthroscopy of right knee 9/2018    History of thyroid surgery 6/22/21    S/P cervical spinal fusion 10/2017 C6-C7 ACDF    S/P laparoscopic cholecystectomy     S/P left oophorectomy 1/2018 - benign, per pt/family

## 2022-01-01 NOTE — ED STATDOCS - PATIENT PORTAL LINK FT
You can access the FollowMyHealth Patient Portal offered by [facility] by registering at the following website: [email or web address]. By joining [employer]s FollowMyHealth portal, you will also be able to view your health information using other applications (apps) compatible with our system.

## 2022-01-01 NOTE — ED STATDOCS - OBJECTIVE STATEMENT
52 y/o female with a PMHx of cervical disc displacement, cervical radiculopathy, chronic pain, DM, gall stones, globus sensation, HLD, HTN, menorrhagia, migraines, shoulder impingement, spinal stenosis of lumbosacral region, spondylolisthesis, thyroid nodule presents to the ED c/o sore throat, cough, body aches and chills x5 days. +sick contacts at home. Vaccinated for COVID and flu. No other complaints at this time.

## 2022-01-06 NOTE — ED PROVIDER NOTE - PRO INTERPRETER NEED 2
[FreeTextEntry1] : Documented by Reyes Rivera acting as scribe for Dr. Joel on 10/07/2021. \par \par All Medical record entries made by the Scribe were at my, Dr. Joel's, direction and personally dictated by me on 10/07/2021. I have reviewed the chart and agree that the record accurately reflects my personal performance of the history, physical exam, assessment and plan. I have also personally directed, reviewed, and agreed with the discharge instructions.  English

## 2022-08-02 ENCOUNTER — EMERGENCY (EMERGENCY)
Facility: HOSPITAL | Age: 52
LOS: 0 days | Discharge: HOME | End: 2022-08-03
Attending: EMERGENCY MEDICINE
Payer: MEDICAID

## 2022-08-02 VITALS — HEIGHT: 63 IN | WEIGHT: 179.9 LBS

## 2022-08-02 DIAGNOSIS — Z98.51 TUBAL LIGATION STATUS: Chronic | ICD-10-CM

## 2022-08-02 DIAGNOSIS — Z79.84 LONG TERM (CURRENT) USE OF ORAL HYPOGLYCEMIC DRUGS: ICD-10-CM

## 2022-08-02 DIAGNOSIS — G43.909 MIGRAINE, UNSPECIFIED, NOT INTRACTABLE, WITHOUT STATUS MIGRAINOSUS: ICD-10-CM

## 2022-08-02 DIAGNOSIS — Z30.430 ENCOUNTER FOR INSERTION OF INTRAUTERINE CONTRACEPTIVE DEVICE: Chronic | ICD-10-CM

## 2022-08-02 DIAGNOSIS — Z90.721 ACQUIRED ABSENCE OF OVARIES, UNILATERAL: Chronic | ICD-10-CM

## 2022-08-02 DIAGNOSIS — Z98.890 OTHER SPECIFIED POSTPROCEDURAL STATES: Chronic | ICD-10-CM

## 2022-08-02 DIAGNOSIS — Z91.018 ALLERGY TO OTHER FOODS: ICD-10-CM

## 2022-08-02 DIAGNOSIS — E78.5 HYPERLIPIDEMIA, UNSPECIFIED: ICD-10-CM

## 2022-08-02 DIAGNOSIS — N39.0 URINARY TRACT INFECTION, SITE NOT SPECIFIED: ICD-10-CM

## 2022-08-02 DIAGNOSIS — R30.0 DYSURIA: ICD-10-CM

## 2022-08-02 DIAGNOSIS — R30.9 PAINFUL MICTURITION, UNSPECIFIED: ICD-10-CM

## 2022-08-02 DIAGNOSIS — Z98.1 ARTHRODESIS STATUS: Chronic | ICD-10-CM

## 2022-08-02 DIAGNOSIS — Z87.39 PERSONAL HISTORY OF OTHER DISEASES OF THE MUSCULOSKELETAL SYSTEM AND CONNECTIVE TISSUE: ICD-10-CM

## 2022-08-02 DIAGNOSIS — Z90.49 ACQUIRED ABSENCE OF OTHER SPECIFIED PARTS OF DIGESTIVE TRACT: Chronic | ICD-10-CM

## 2022-08-02 DIAGNOSIS — I10 ESSENTIAL (PRIMARY) HYPERTENSION: ICD-10-CM

## 2022-08-02 DIAGNOSIS — Z79.82 LONG TERM (CURRENT) USE OF ASPIRIN: ICD-10-CM

## 2022-08-02 LAB
APPEARANCE UR: CLEAR — SIGNIFICANT CHANGE UP
BILIRUB UR-MCNC: NEGATIVE — SIGNIFICANT CHANGE UP
COLOR SPEC: YELLOW — SIGNIFICANT CHANGE UP
DIFF PNL FLD: NEGATIVE — SIGNIFICANT CHANGE UP
GLUCOSE UR QL: NEGATIVE — SIGNIFICANT CHANGE UP
KETONES UR-MCNC: NEGATIVE — SIGNIFICANT CHANGE UP
LEUKOCYTE ESTERASE UR-ACNC: ABNORMAL
NITRITE UR-MCNC: NEGATIVE — SIGNIFICANT CHANGE UP
PH UR: 6 — SIGNIFICANT CHANGE UP (ref 5–8)
PROT UR-MCNC: NEGATIVE — SIGNIFICANT CHANGE UP
SP GR SPEC: 1.01 — SIGNIFICANT CHANGE UP (ref 1.01–1.02)
UROBILINOGEN FLD QL: NEGATIVE — SIGNIFICANT CHANGE UP

## 2022-08-02 PROCEDURE — 99283 EMERGENCY DEPT VISIT LOW MDM: CPT

## 2022-08-02 PROCEDURE — 81001 URINALYSIS AUTO W/SCOPE: CPT

## 2022-08-02 RX ORDER — ONDANSETRON 8 MG/1
4 TABLET, FILM COATED ORAL ONCE
Refills: 0 | Status: DISCONTINUED | OUTPATIENT
Start: 2022-08-02 | End: 2022-08-02

## 2022-08-02 RX ORDER — ACETAMINOPHEN 500 MG
650 TABLET ORAL ONCE
Refills: 0 | Status: COMPLETED | OUTPATIENT
Start: 2022-08-02 | End: 2022-08-02

## 2022-08-02 RX ORDER — ONDANSETRON 8 MG/1
4 TABLET, FILM COATED ORAL ONCE
Refills: 0 | Status: COMPLETED | OUTPATIENT
Start: 2022-08-02 | End: 2022-08-02

## 2022-08-02 RX ADMIN — ONDANSETRON 4 MILLIGRAM(S): 8 TABLET, FILM COATED ORAL at 21:03

## 2022-08-02 RX ADMIN — Medication 650 MILLIGRAM(S): at 21:04

## 2022-08-02 NOTE — ED STATDOCS - NS ED ROS FT
Constitutional: No fever or chills  Eyes: No visual changes  HEENT: No throat pain  CV: No chest pain  Resp: No SOB no cough  GI: No abd pain, nausea or vomiting  : +dysuria, +burning urination  MSK: No musculoskeletal pain  Skin: No rash  Neuro: No headache

## 2022-08-02 NOTE — ED STATDOCS - OBJECTIVE STATEMENT
52 y/o F with a pMHx of menorrhagia, HTN, HLD, and cervical radiculopathy presents to the ED c/o dysuria and burning urination. pt states she had the pain for a week, it went away and then came back today. Denies abd pain and N/V.

## 2022-08-02 NOTE — ED STATDOCS - CLINICAL SUMMARY MEDICAL DECISION MAKING FREE TEXT BOX
UA obtained and given zofran and tylenol. UA obtained and given zofran and tylenol. UA showed some leuck esterase, given she has symptoms, I did treat her for a UTI, she is given f/u, return precautions and dc in stable condition.

## 2022-08-02 NOTE — ED STATDOCS - NSICDXPASTMEDICALHX_GEN_ALL_CORE_FT
PAST MEDICAL HISTORY:  Cervical disc displacement     Cervical radiculopathy     Chronic pain had steroid injections 1 month ago    Diabetes mellitus     Gall stones s/p cholecystectomy    Globus sensation c/o dysphagia - s/p laryngoscopy 12/2018 Dr Jackson - [facility]    High cholesterol     Hypertension     Hypertension     Menorrhagia with irregular cycle Mirena IUD placed    Migraines     Shoulder impingement, right     Shoulder injury per pt/family - while in Rehab after 10/2017 C6-C7 ACDF, television fell from mount on wall and struck her in head/right shoulder    Spinal stenosis of lumbosacral region     Spondylolisthesis     Thyroid nodule s/p biopsy 2/2019 - nontoxic

## 2022-08-02 NOTE — ED STATDOCS - CARE PLAN
The critical potassium level was called to me this afternoon.  Dr. Hernandez had taken care of the patient today so he called her cell phone.  Unfortunately, and went straight to voicemail.  He left a message indicating that she had hyperkalemia that could be potentially life-threatening and recommended that she come to the emergency department at [facility] for evaluation and management.  This could include urgent dialysis catheter placement tonight.  Because I was concerned that the patient had not called back or confirmed that she received a voicemail message I just called her again.  Fortunately, I was able to speak to her.  I relayed the information about her potassium and our recommendation that she come in to the emergency room for an evaluation.  I informed her that if her potassium cannot be controlled medically that she may need to have an urgent dialysis catheter placed tonight.  If it can, then I would recommend catheter placement tomorrow as opposed to  thrombectomy in order to facilitate dialysis.  She says that she did not want a dialysis catheter.  I told her that she would be putting herself at risk of cardiac related death because of hyperkalemia in that case.  I strongly encouraged her to come in for evaluation.  She swore and then hung up the phone. 1 Principal Discharge DX:	Acute UTI

## 2022-08-02 NOTE — ED STATDOCS - NSICDXPASTSURGICALHX_GEN_ALL_CORE_FT
PAST SURGICAL HISTORY:  Encounter for insertion of mirena IUD 2017    H/O tubal ligation 2008 [facility]    History of arthroscopy of right knee 9/2018    History of thyroid surgery 6/22/21    S/P cervical spinal fusion 10/2017 C6-C7 ACDF    S/P laparoscopic cholecystectomy     S/P left oophorectomy 1/2018 - benign, per pt/family

## 2022-08-02 NOTE — ED STATDOCS - PATIENT PORTAL LINK FT
You can access the FollowMyHealth Patient Portal offered by [facility] by registering at the following website: [email or web address]. By joining [employer]’s FollowMyHealth portal, you will also be able to view your health information using other applications (apps) compatible with our system.

## 2022-08-02 NOTE — ED ADULT TRIAGE NOTE - CHIEF COMPLAINT QUOTE
Pt presents to the ED c/o painful burning upon urination and increased frequency x1 week. Denies back pain or fevers.

## 2022-08-02 NOTE — ED STATDOCS - PHYSICAL EXAMINATION
Constitutional: NAD AAOx3  Eyes: PERRL, EOMI  Head: Normocephalic atraumatic  Mouth: MMM  Cardiac: regular rate   Resp: Lungs CTAB  GI: Abd s/nt/nd  : no flank pain  Neuro: CN2-12 intact  Extremities: Intact distal pulses b/l, no calf tenderness, normal ROM b/l UE and LE   Skin: No rashes

## 2022-08-03 VITALS
DIASTOLIC BLOOD PRESSURE: 60 MMHG | RESPIRATION RATE: 18 BRPM | TEMPERATURE: 98 F | HEART RATE: 70 BPM | OXYGEN SATURATION: 100 % | SYSTOLIC BLOOD PRESSURE: 121 MMHG

## 2022-08-03 RX ORDER — CEPHALEXIN 500 MG
500 CAPSULE ORAL ONCE
Refills: 0 | Status: COMPLETED | OUTPATIENT
Start: 2022-08-03 | End: 2022-08-03

## 2022-08-03 RX ORDER — CEPHALEXIN 500 MG
1 CAPSULE ORAL
Qty: 14 | Refills: 0
Start: 2022-08-03 | End: 2022-08-09

## 2022-08-03 RX ADMIN — Medication 500 MILLIGRAM(S): at 00:29

## 2022-08-03 NOTE — ED ADULT NURSE NOTE - NSICDXPASTSURGICALHX_GEN_ALL_CORE_FT
PAST SURGICAL HISTORY:  Encounter for insertion of mirena IUD 2017    H/O tubal ligation 2008 [facility]    History of arthroscopy of right knee 9/2018    History of thyroid surgery 6/22/21    S/P cervical spinal fusion 10/2017 C6-C7 ACDF    S/P laparoscopic cholecystectomy     S/P left oophorectomy 1/2018 - benign, per pt/family

## 2022-08-03 NOTE — ED ADULT NURSE NOTE - NSIMPLEMENTINTERV_GEN_ALL_ED
Implemented All Universal Safety Interventions:  [place] to call system. Call bell, personal items and telephone within reach. Instruct patient to call for assistance. Room bathroom lighting operational. Non-slip footwear when patient is off stretcher. Physically safe environment: no spills, clutter or unnecessary equipment. Stretcher in lowest position, wheels locked, appropriate side rails in place.

## 2022-08-03 NOTE — ED ADULT NURSE NOTE - NSICDXPASTMEDICALHX_GEN_ALL_CORE_FT
PAST MEDICAL HISTORY:  Cervical disc displacement     Cervical radiculopathy     Chronic pain had steroid injections 1 month ago    Diabetes mellitus     Gall stones s/p cholecystectomy    Globus sensation c/o dysphagia - s/p laryngoscopy 12/2018 Dr Jackson - [facility]    High cholesterol     Hypertension     Hypertension     Menorrhagia with irregular cycle Mirena IUD placed    Migraines     Shoulder impingement, right     Shoulder injury per pt/family - while in Rehab after 10/2017 C6-C7 ACDF, television fell from mount on wall and struck her in head/right shoulder    Spinal stenosis of lumbosacral region     Spondylolisthesis     Thyroid nodule s/p biopsy 2/2019 - nontoxic

## 2022-08-17 NOTE — ASU PATIENT PROFILE, ADULT - AS SC BRADEN MOISTURE
Plan: Treatment goal is treat and prevent inflammation and itching. Render In Strict Bullet Format?: No Detail Level: Zone Initiate Treatment: Triamcinolone \\nAllegra Allergy \\nZyrtec (4) rarely moist

## 2022-10-24 NOTE — DISCHARGE NOTE ADULT - MEDICATION SUMMARY - MEDICATIONS TO CHANGE
I will SWITCH the dose or number of times a day I take the medications listed below when I get home from the hospital:    gabapentin 300 mg oral capsule  -- 1 cap(s) by mouth once a day Opioid Pregnancy And Lactation Text: These medications can lead to premature delivery and should be avoided during pregnancy. These medications are also present in breast milk in small amounts.

## 2022-12-01 ENCOUNTER — OFFICE (OUTPATIENT)
Dept: URBAN - METROPOLITAN AREA CLINIC 100 | Facility: CLINIC | Age: 52
Setting detail: OPHTHALMOLOGY
End: 2022-12-01
Payer: COMMERCIAL

## 2022-12-01 DIAGNOSIS — G24.5: ICD-10-CM

## 2022-12-01 PROCEDURE — 64612 DESTROY NERVE FACE MUSCLE: CPT | Performed by: OPHTHALMOLOGY

## 2022-12-01 ASSESSMENT — KERATOMETRY
OS_K1POWER_DIOPTERS: 44.25
OS_AXISANGLE_DEGREES: 092
METHOD_AUTO_MANUAL: AUTO
OS_K2POWER_DIOPTERS: 45.50
OD_K2POWER_DIOPTERS: 46.00
OD_K1POWER_DIOPTERS: 44.75
OD_AXISANGLE_DEGREES: 094

## 2022-12-01 ASSESSMENT — DECREASING TEAR LAKE - SEVERITY SCORE
OS_DEC_TEARLAKE: 1+
OD_DEC_TEARLAKE: 2+

## 2022-12-01 ASSESSMENT — VISUAL ACUITY
OS_BCVA: 20/25
OD_BCVA: 20/25

## 2022-12-01 ASSESSMENT — LID EXAM ASSESSMENTS
OS_MEIBOMITIS: LLL 1+
OD_BLEPHARITIS: RUL 1+
OD_MEIBOMITIS: RLL 1+
OS_BLEPHARITIS: LUL 1+

## 2022-12-01 ASSESSMENT — REFRACTION_AUTOREFRACTION
OS_AXIS: 162
OD_AXIS: 012
OS_SPHERE: +0.50
OD_CYLINDER: -2.00
OD_AXIS: 2
OD_SPHERE: PLANO
OS_CYLINDER: -0.25
OS_AXIS: 12
OD_SPHERE: +0.25
OS_CYLINDER: -0.75
OD_CYLINDER: -0.25
OS_SPHERE: PLANO

## 2022-12-01 ASSESSMENT — AXIALLENGTH_DERIVED
OS_AL: 22.9582
OD_AL: 22.8769

## 2022-12-01 ASSESSMENT — SPHEQUIV_DERIVED
OS_SPHEQUIV: 0.375
OD_SPHEQUIV: 0.125

## 2022-12-01 ASSESSMENT — CORNEAL DYSTROPHY - POSTERIOR
OS_POSTERIORDYSTROPHY: GUTTATA
OD_POSTERIORDYSTROPHY: GUTTATA

## 2022-12-01 ASSESSMENT — CONFRONTATIONAL VISUAL FIELD TEST (CVF)
OD_FINDINGS: FULL
OS_FINDINGS: FULL

## 2022-12-05 ENCOUNTER — OFFICE (OUTPATIENT)
Dept: URBAN - METROPOLITAN AREA CLINIC 12 | Facility: CLINIC | Age: 52
Setting detail: OPHTHALMOLOGY
End: 2022-12-05

## 2022-12-05 DIAGNOSIS — H11.153: ICD-10-CM

## 2022-12-05 DIAGNOSIS — H50.52: ICD-10-CM

## 2022-12-05 DIAGNOSIS — G51.0: ICD-10-CM

## 2022-12-05 DIAGNOSIS — H01.001: ICD-10-CM

## 2022-12-05 DIAGNOSIS — H16.223: ICD-10-CM

## 2022-12-05 DIAGNOSIS — H18.513: ICD-10-CM

## 2022-12-05 DIAGNOSIS — I63.50: ICD-10-CM

## 2022-12-05 DIAGNOSIS — H01.004: ICD-10-CM

## 2022-12-05 DIAGNOSIS — H52.13: ICD-10-CM

## 2022-12-05 DIAGNOSIS — H10.503: ICD-10-CM

## 2022-12-05 PROCEDURE — 99024 POSTOP FOLLOW-UP VISIT: CPT | Performed by: OPHTHALMOLOGY

## 2022-12-05 ASSESSMENT — SUPERFICIAL PUNCTATE KERATITIS (SPK)
OS_SPK: 1+
OD_SPK: 1+

## 2022-12-05 ASSESSMENT — VISUAL ACUITY
OD_BCVA: 20/20
OS_BCVA: 20/25

## 2022-12-05 ASSESSMENT — AXIALLENGTH_DERIVED
OD_AL: 23.0948
OS_AL: 23.0071

## 2022-12-05 ASSESSMENT — TONOMETRY
OS_IOP_MMHG: 11
OD_IOP_MMHG: 12

## 2022-12-05 ASSESSMENT — REFRACTION_AUTOREFRACTION
OS_SPHERE: PLANO
OS_CYLINDER: -0.25
OD_AXIS: 2
OD_CYLINDER: -2.00
OS_CYLINDER: -0.75
OS_SPHERE: +0.25
OD_SPHERE: +0.25
OS_AXIS: 007
OS_AXIS: 12
OD_SPHERE: PLANO
OD_CYLINDER: -0.25
OD_AXIS: 050

## 2022-12-05 ASSESSMENT — KERATOMETRY
OD_AXISANGLE_DEGREES: 105
OS_K1POWER_DIOPTERS: 44.50
OS_AXISANGLE_DEGREES: 085
OS_K2POWER_DIOPTERS: 45.50
OD_K1POWER_DIOPTERS: 44.50
OD_K2POWER_DIOPTERS: 45.00
METHOD_AUTO_MANUAL: AUTO

## 2022-12-05 ASSESSMENT — CONFRONTATIONAL VISUAL FIELD TEST (CVF)
OD_FINDINGS: FULL
OS_FINDINGS: FULL

## 2022-12-05 ASSESSMENT — LID EXAM ASSESSMENTS
OD_MEIBOMITIS: RLL 1+
OD_BLEPHARITIS: RUL 1+
OS_MEIBOMITIS: LLL 1+
OS_BLEPHARITIS: LUL 1+

## 2022-12-05 ASSESSMENT — SPHEQUIV_DERIVED
OS_SPHEQUIV: 0.125
OD_SPHEQUIV: 0.125

## 2022-12-05 ASSESSMENT — CORNEAL DYSTROPHY - POSTERIOR
OD_POSTERIORDYSTROPHY: GUTTATA
OS_POSTERIORDYSTROPHY: GUTTATA

## 2022-12-08 ENCOUNTER — OFFICE (OUTPATIENT)
Dept: URBAN - METROPOLITAN AREA CLINIC 12 | Facility: CLINIC | Age: 52
Setting detail: OPHTHALMOLOGY
End: 2022-12-08
Payer: COMMERCIAL

## 2022-12-08 ENCOUNTER — RX ONLY (RX ONLY)
Age: 52
End: 2022-12-08

## 2022-12-08 DIAGNOSIS — H10.503: ICD-10-CM

## 2022-12-08 DIAGNOSIS — H01.001: ICD-10-CM

## 2022-12-08 DIAGNOSIS — H01.004: ICD-10-CM

## 2022-12-08 PROBLEM — H25.13 CATARACT; BOTH EYES: Status: ACTIVE | Noted: 2022-12-08

## 2022-12-08 PROCEDURE — 92012 INTRM OPH EXAM EST PATIENT: CPT | Performed by: OPHTHALMOLOGY

## 2022-12-08 ASSESSMENT — CORNEAL DYSTROPHY - POSTERIOR
OS_POSTERIORDYSTROPHY: GUTTATA
OD_POSTERIORDYSTROPHY: GUTTATA

## 2022-12-08 ASSESSMENT — SPHEQUIV_DERIVED
OS_SPHEQUIV: 0.125
OD_SPHEQUIV: 0.25

## 2022-12-08 ASSESSMENT — REFRACTION_AUTOREFRACTION
OS_CYLINDER: -0.25
OS_AXIS: 12
OD_AXIS: 026
OS_CYLINDER: -0.75
OS_SPHERE: PLANO
OD_SPHERE: +0.50
OS_SPHERE: +0.25
OD_CYLINDER: -0.50
OD_AXIS: 2
OD_SPHERE: PLANO
OS_AXIS: 152
OD_CYLINDER: -2.00

## 2022-12-08 ASSESSMENT — CONFRONTATIONAL VISUAL FIELD TEST (CVF)
OS_FINDINGS: FULL
OD_FINDINGS: FULL

## 2022-12-08 ASSESSMENT — KERATOMETRY
OS_K1POWER_DIOPTERS: 44.50
OD_K2POWER_DIOPTERS: 45.50
OD_AXISANGLE_DEGREES: 098
OS_AXISANGLE_DEGREES: 078
METHOD_AUTO_MANUAL: AUTO
OS_K2POWER_DIOPTERS: 45.25
OD_K1POWER_DIOPTERS: 44.50

## 2022-12-08 ASSESSMENT — AXIALLENGTH_DERIVED
OS_AL: 23.0509
OD_AL: 22.9609

## 2022-12-08 ASSESSMENT — LID EXAM ASSESSMENTS
OS_BLEPHARITIS: LUL 1+
OD_BLEPHARITIS: RUL 1+
OD_MEIBOMITIS: RLL 1+
OS_MEIBOMITIS: LLL 1+

## 2022-12-08 ASSESSMENT — VISUAL ACUITY
OD_BCVA: 20/20-1
OS_BCVA: 20/20

## 2022-12-08 ASSESSMENT — SUPERFICIAL PUNCTATE KERATITIS (SPK)
OD_SPK: 1+
OS_SPK: 1+

## 2022-12-08 ASSESSMENT — TONOMETRY: OS_IOP_MMHG: 11

## 2022-12-16 NOTE — BRIEF OPERATIVE NOTE - ASSISTANT(S)
DANIEL Lui PA-C, MATHEW Catalan PA-C
bilateral upper extremity ROM was WFL (within functional limits)/bilateral lower extremity ROM was WFL (within functional limits)

## 2023-02-08 NOTE — ED ADULT NURSE NOTE - NSSUHOSCREENINGYN_ED_ALL_ED
Yes - the patient is able to be screened Hydroquinone Counseling:  Patient advised that medication may result in skin irritation, lightening (hypopigmentation), dryness, and burning.  In the event of skin irritation, the patient was advised to reduce the amount of the drug applied or use it less frequently.  Rarely, spots that are treated with hydroquinone can become darker (pseudoochronosis).  Should this occur, patient instructed to stop medication and call the office. The patient verbalized understanding of the proper use and possible adverse effects of hydroquinone.  All of the patient's questions and concerns were addressed.

## 2023-05-15 NOTE — ED PROVIDER NOTE - NSCAREINITIATED _GEN_ER
Liu Calvert(PA) Nsaids Pregnancy And Lactation Text: These medications are considered safe up to 30 weeks gestation. It is excreted in breast milk.

## 2023-07-26 NOTE — ED CDU PROVIDER SUBSEQUENT DAY NOTE - PROGRESS NOTE DETAILS
CDU NOTE LYNDSEY Swanson: pt asleep. NAD. 26-Jul-2023 12:55 Patient seen at bedside in NAD.  VSS.  Patient resting comfortably.  C/o neck pain 6/10.  Will give home dose oxycodone.  Awaiting final MRI read. -Kwame Servin PA-C

## 2024-03-18 NOTE — H&P PST ADULT - INTERPRETER NAME
The patient is Watcher - Medium risk of patient condition declining or worsening    Shift Goals  Clinical Goals: Hemodynamic stability, monitor labs  Patient Goals: Heart rate control, rest  Family Goals: DEE DEE    PT updated on POC. Pt educated on New onset Afib.     Problem: Knowledge Deficit - Standard  Goal: Patient and family/care givers will demonstrate understanding of plan of care, disease process/condition, diagnostic tests and medications  Outcome: Progressing       Progress made toward(s) clinical / shift goals:    Problem: Pain - Standard  Goal: Alleviation of pain or a reduction in pain to the patient’s comfort goal  Outcome: Progressing     Problem: Skin Integrity  Goal: Skin integrity is maintained or improved  Outcome: Progressing     Problem: Fall Risk  Goal: Patient will remain free from falls  Outcome: Progressing     Problem: Hemodynamics  Goal: Patient's hemodynamics, fluid balance and neurologic status will be stable or improve  Outcome: Progressing       Patient is not progressing towards the following goals:       Moisés Armenta

## 2024-07-12 ENCOUNTER — RESULT REVIEW (OUTPATIENT)
Age: 54
End: 2024-07-12

## 2024-07-12 ENCOUNTER — TRANSCRIPTION ENCOUNTER (OUTPATIENT)
Age: 54
End: 2024-07-12

## 2024-07-13 ENCOUNTER — TRANSCRIPTION ENCOUNTER (OUTPATIENT)
Age: 54
End: 2024-07-13

## 2025-04-24 NOTE — H&P PST ADULT - MAMMOGRAM, RESULTS OF LAST, PROFILE
Hypertension or High blood pressure is a condition that puts you at risk for heart attack, stroke, and kidney disease. It does not usually cause symptoms. But it can be serious.  Generally speaking, your target blood pressure is 130/80 or less. We encourage self-monitoring of blood pressure regularly at home, keeping a log and bringing it with you during doctors' visits.  You have a lot of control over your blood pressure. To lower it:  1) Lose weight (if you are overweight)  2)Choose a diet low in fat and rich in fruits, vegetables, and low-fat dairy products  3) Reduce the amount of salt you eat  4) Do something active for at least 30 minutes a day on most days of the week  5) Cut down on alcohol (if you drink more than 2 alcoholic drinks per day).  I (or your other doctors) may prescribe you medications to lower blood pressure. It is important that you do not stop these medications without speaking with us   
ok

## 2025-06-27 NOTE — ED PROVIDER NOTE - MEDICAL DECISION MAKING DETAILS
46 yo F cervical spinal fusion 2 weeks ago in rehab for R arm weakness BIBA after television fell from wall onto her head 2 days ago at facility. Pt c/o neck pain and dysphagia. No new focal neuro deficit on exam. PERRLA, EOMI, no obvious trauma. Will obtain head and neck CT to r/o ICH and spinal fracture.
No